# Patient Record
Sex: FEMALE | Race: BLACK OR AFRICAN AMERICAN | Employment: OTHER | URBAN - METROPOLITAN AREA
[De-identification: names, ages, dates, MRNs, and addresses within clinical notes are randomized per-mention and may not be internally consistent; named-entity substitution may affect disease eponyms.]

---

## 2020-08-25 ENCOUNTER — APPOINTMENT (OUTPATIENT)
Dept: GENERAL RADIOLOGY | Age: 75
End: 2020-08-25
Payer: MEDICAID

## 2020-08-25 ENCOUNTER — APPOINTMENT (OUTPATIENT)
Dept: CT IMAGING | Age: 75
End: 2020-08-25
Payer: MEDICAID

## 2020-08-25 ENCOUNTER — HOSPITAL ENCOUNTER (EMERGENCY)
Age: 75
Discharge: HOME | End: 2020-08-26
Attending: EMERGENCY MEDICINE
Payer: MEDICAID

## 2020-08-25 LAB
BASOPHILS ABSOLUTE: 0.03 E9/L (ref 0–0.2)
BASOPHILS RELATIVE PERCENT: 0.4 % (ref 0–2)
BILIRUBIN URINE: NEGATIVE
BLOOD, URINE: NORMAL
CLARITY: CLEAR
COLOR: YELLOW
EOSINOPHILS ABSOLUTE: 0.06 E9/L (ref 0.05–0.5)
EOSINOPHILS RELATIVE PERCENT: 0.9 % (ref 0–6)
GLUCOSE URINE: NEGATIVE MG/DL
HCT VFR BLD CALC: 42.5 % (ref 34–48)
HEMOGLOBIN: 13.2 G/DL (ref 11.5–15.5)
IMMATURE GRANULOCYTES #: 0.02 E9/L
IMMATURE GRANULOCYTES %: 0.3 % (ref 0–5)
KETONES, URINE: NEGATIVE MG/DL
LACTIC ACID: 1.2 MMOL/L (ref 0.5–2.2)
LEUKOCYTE ESTERASE, URINE: NEGATIVE
LIPASE: 120 U/L (ref 13–60)
LYMPHOCYTES ABSOLUTE: 1.78 E9/L (ref 1.5–4)
LYMPHOCYTES RELATIVE PERCENT: 26.4 % (ref 20–42)
MCH RBC QN AUTO: 28.7 PG (ref 26–35)
MCHC RBC AUTO-ENTMCNC: 31.1 % (ref 32–34.5)
MCV RBC AUTO: 92.4 FL (ref 80–99.9)
MONOCYTES ABSOLUTE: 0.51 E9/L (ref 0.1–0.95)
MONOCYTES RELATIVE PERCENT: 7.6 % (ref 2–12)
NEUTROPHILS ABSOLUTE: 4.33 E9/L (ref 1.8–7.3)
NEUTROPHILS RELATIVE PERCENT: 64.4 % (ref 43–80)
NITRITE, URINE: NEGATIVE
PDW BLD-RTO: 12.7 FL (ref 11.5–15)
PH UA: 7.5 (ref 5–9)
PLATELET # BLD: 277 E9/L (ref 130–450)
PMV BLD AUTO: 9.8 FL (ref 7–12)
PROTEIN UA: NEGATIVE MG/DL
RBC # BLD: 4.6 E12/L (ref 3.5–5.5)
REASON FOR REJECTION: NORMAL
REJECTED TEST: NORMAL
SPECIFIC GRAVITY UA: 1.02 (ref 1–1.03)
UROBILINOGEN, URINE: 1 E.U./DL
WBC # BLD: 6.7 E9/L (ref 4.5–11.5)

## 2020-08-25 PROCEDURE — 80053 COMPREHEN METABOLIC PANEL: CPT

## 2020-08-25 PROCEDURE — 74177 CT ABD & PELVIS W/CONTRAST: CPT

## 2020-08-25 PROCEDURE — 84484 ASSAY OF TROPONIN QUANT: CPT

## 2020-08-25 PROCEDURE — 85025 COMPLETE CBC W/AUTO DIFF WBC: CPT

## 2020-08-25 PROCEDURE — 87088 URINE BACTERIA CULTURE: CPT

## 2020-08-25 PROCEDURE — 71046 X-RAY EXAM CHEST 2 VIEWS: CPT

## 2020-08-25 PROCEDURE — 81001 URINALYSIS AUTO W/SCOPE: CPT

## 2020-08-25 PROCEDURE — 99285 EMERGENCY DEPT VISIT HI MDM: CPT

## 2020-08-25 PROCEDURE — 83605 ASSAY OF LACTIC ACID: CPT

## 2020-08-25 PROCEDURE — 83690 ASSAY OF LIPASE: CPT

## 2020-08-25 RX ORDER — CHLORHEXIDINE GLUCONATE 0.12 MG/ML
15 RINSE ORAL PRN
COMMUNITY
Start: 2020-04-10 | End: 2021-07-14

## 2020-08-25 RX ORDER — AMMONIUM LACTATE 12 G/100G
CREAM TOPICAL PRN
COMMUNITY
Start: 2020-05-19

## 2020-08-25 ASSESSMENT — PAIN DESCRIPTION - ONSET: ONSET: ON-GOING

## 2020-08-25 ASSESSMENT — PAIN DESCRIPTION - ORIENTATION: ORIENTATION: LEFT

## 2020-08-25 ASSESSMENT — PAIN DESCRIPTION - LOCATION: LOCATION: ABDOMEN

## 2020-08-25 ASSESSMENT — PAIN DESCRIPTION - PAIN TYPE: TYPE: ACUTE PAIN

## 2020-08-25 ASSESSMENT — PAIN DESCRIPTION - DESCRIPTORS: DESCRIPTORS: BURNING

## 2020-08-25 ASSESSMENT — PAIN DESCRIPTION - FREQUENCY: FREQUENCY: INTERMITTENT

## 2020-08-25 ASSESSMENT — PAIN DESCRIPTION - DIRECTION: RADIATING_TOWARDS: LEFT SIDE

## 2020-08-25 ASSESSMENT — PAIN SCALES - GENERAL: PAINLEVEL_OUTOF10: 5

## 2020-08-25 NOTE — ED NOTES
FIRST PROVIDER CONTACT ASSESSMENT NOTE      Department of Emergency Medicine   ED  First Provider Note   8/25/20  6:36 PM EDT    Chief Complaint: Dizziness (feeling lighted headed this am)      History of Present Illness:    Davi Mcintosh is a 75 y.o. female who presents to the ED by private car for  LEFT FLANK PAIN DIZZINESS   Focused Screening Exam:  Constitutional:  Alert, appears stated age and is in no distress.   Heart regular rate and rhythm  Lungs clear    *ALLERGIES*     Darvocet [propoxyphene n-acetaminophen] and Darvon [propoxyphene hcl]     ED Triage Vitals   BP Temp Temp src Pulse Resp SpO2 Height Weight   -- -- -- -- -- -- -- --        Initial Plan of Care:  Initiate Treatment-Testing, Proceed toTreatment Area When Bed Available for ED Attending/MLP to Continue Care    -----------------END OF FIRST PROVIDER CONTACT ASSESSMENT NOTE--------------  Electronically signed by ADDISON Trammell   DD: 8/25/20     ADDISON Trammell  08/26/20 7615

## 2020-08-26 VITALS
TEMPERATURE: 98.1 F | RESPIRATION RATE: 14 BRPM | HEART RATE: 68 BPM | OXYGEN SATURATION: 97 % | WEIGHT: 156 LBS | SYSTOLIC BLOOD PRESSURE: 155 MMHG | HEIGHT: 62 IN | BODY MASS INDEX: 28.71 KG/M2 | DIASTOLIC BLOOD PRESSURE: 82 MMHG

## 2020-08-26 LAB
ALBUMIN SERPL-MCNC: 3.8 G/DL (ref 3.5–5.2)
ALP BLD-CCNC: 111 U/L (ref 35–104)
ALT SERPL-CCNC: 8 U/L (ref 0–32)
ANION GAP SERPL CALCULATED.3IONS-SCNC: 6 MMOL/L (ref 7–16)
AST SERPL-CCNC: 20 U/L (ref 0–31)
BACTERIA: ABNORMAL /HPF
BILIRUB SERPL-MCNC: 0.4 MG/DL (ref 0–1.2)
BUN BLDV-MCNC: 10 MG/DL (ref 8–23)
CALCIUM SERPL-MCNC: 9.3 MG/DL (ref 8.6–10.2)
CHLORIDE BLD-SCNC: 105 MMOL/L (ref 98–107)
CO2: 26 MMOL/L (ref 22–29)
CREAT SERPL-MCNC: 0.7 MG/DL (ref 0.5–1)
EPITHELIAL CELLS, UA: ABNORMAL /HPF
GFR AFRICAN AMERICAN: >60
GFR NON-AFRICAN AMERICAN: >60 ML/MIN/1.73
GLUCOSE BLD-MCNC: 87 MG/DL (ref 74–99)
POTASSIUM SERPL-SCNC: 4.1 MMOL/L (ref 3.5–5)
RBC UA: ABNORMAL /HPF (ref 0–2)
SODIUM BLD-SCNC: 137 MMOL/L (ref 132–146)
TOTAL PROTEIN: 7.5 G/DL (ref 6.4–8.3)
TROPONIN: <0.01 NG/ML (ref 0–0.03)
WBC UA: ABNORMAL /HPF (ref 0–5)

## 2020-08-26 PROCEDURE — 74177 CT ABD & PELVIS W/CONTRAST: CPT

## 2020-08-26 PROCEDURE — 2580000003 HC RX 258: Performed by: RADIOLOGY

## 2020-08-26 PROCEDURE — 6360000004 HC RX CONTRAST MEDICATION: Performed by: RADIOLOGY

## 2020-08-26 RX ORDER — SODIUM CHLORIDE 0.9 % (FLUSH) 0.9 %
10 SYRINGE (ML) INJECTION ONCE
Status: COMPLETED | OUTPATIENT
Start: 2020-08-26 | End: 2020-08-26

## 2020-08-26 RX ADMIN — IOPAMIDOL 110 ML: 755 INJECTION, SOLUTION INTRAVENOUS at 00:47

## 2020-08-26 RX ADMIN — SODIUM CHLORIDE, PRESERVATIVE FREE 10 ML: 5 INJECTION INTRAVENOUS at 00:47

## 2020-08-26 NOTE — ED NOTES
Reviewed discharge instructions with patient, discussed medications and addressed all patient and family questions/concerns. Pt verbalizes understanding.        Theresa Pop RN  08/26/20 8407

## 2020-08-26 NOTE — ED PROVIDER NOTES
Department of Emergency Medicine   ED  Provider Note  Admit Date/RoomTime: 8/25/2020  8:10 PM  ED Room: 16/16 8/25/20  9:25 PM EDT      HISTORY OF PRESENT ILLNESS:  (Nurses Notes Reviewed)    Chief Complaint:   Dizziness (feeling lighted headed this am)      Source of history provided by:  patient. History/Exam Limitations: none. Marck Beal is a 75 y.o. old female presenting to the emergency department for complaints of sudden onset Lightheaded which began several hour(s) prior to arrival.  Since recognized her symptoms have been improving. She has associated symptoms of L flank pain and denies any no other pertinent symptoms. The symptoms are aggravated by standing up. The patient has a past history of: No other pertinent PMH. There has been no history of recent trauma. Patient ports this morning upon standing she would get lightheaded. She denies any current lightheadedness. In addition she is complaining of intermittent episodes of left flank pain that she reports improves after taking Tums. The pain does not radiate anywhere else. She denies headache, vision changes, chest pain, shortness of breath, abdominal pain or any other complaints at this time      Review of Systems:   Pertinent positives and negatives are stated within HPI, all other systems reviewed and are negative. Review of Systems   Constitutional: Negative for chills and fever. HENT: Negative for ear pain, sinus pressure and sore throat. Eyes: Negative for pain, discharge and redness. Respiratory: Negative for cough, shortness of breath and wheezing. Cardiovascular: Negative for chest pain. Gastrointestinal: Negative for abdominal distention, abdominal pain, diarrhea, nausea and vomiting. Genitourinary: Positive for flank pain. Negative for dysuria, frequency and urgency. Musculoskeletal: Negative for arthralgias and back pain. Skin: Negative for rash and wound.    Neurological: Positive for light-headedness. Negative for dizziness, syncope, weakness and headaches. Hematological: Negative for adenopathy. All other systems reviewed and are negative.            --------------------------------------------- PAST HISTORY ---------------------------------------------  Past Medical History:  has a past medical history of Hypertension. Past Surgical History:  has a past surgical history that includes Rotator cuff repair (Bilateral). Social History:  reports that she has never smoked. She has never used smokeless tobacco. She reports current alcohol use. She reports that she does not use drugs. Family History: family history is not on file. The patients home medications have been reviewed. Allergies: Darvocet [propoxyphene n-acetaminophen] and Darvon [propoxyphene hcl]        ---------------------------------------------------PHYSICAL EXAM--------------------------------------    Constitutional/General: Alert and oriented x3, well appearing, non toxic in NAD  Head: Normocephalic and atraumatic  Eyes: PERRL, EOMI  Mouth: Oropharynx clear, handling secretions, no trismus  Neck: Supple, full ROM, non tender to palpation in the midline, no stridor, no crepitus, no meningeal signs  Pulmonary: Lungs clear to auscultation bilaterally, no wheezes, rales, or rhonchi. Not in respiratory distress  Cardiovascular:  Regular rate. Regular rhythm. No murmurs, gallops, or rubs. 2+ distal pulses  Chest: no chest wall tenderness  Abdomen: Soft. Non tender. Non distended. +BS. No rebound, guarding, or rigidity. No pulsatile masses appreciated. Musculoskeletal: Moves all extremities x 4. Warm and well perfused, no clubbing, cyanosis, or edema. Capillary refill <3 seconds  Skin: warm and dry. No rashes. Neurologic: GCS 15, CN 2-12 grossly intact, no focal deficits, symmetric strength 5/5 in the upper and lower extremities bilaterally. no ataxia.  NIH =0  Psych: Normal 137 132 - 146 mmol/L    Potassium 4.1 3.5 - 5.0 mmol/L    Chloride 105 98 - 107 mmol/L    CO2 26 22 - 29 mmol/L    Anion Gap 6 (L) 7 - 16 mmol/L    Glucose 87 74 - 99 mg/dL    BUN 10 8 - 23 mg/dL    CREATININE 0.7 0.5 - 1.0 mg/dL    GFR Non-African American >60 >=60 mL/min/1.73    GFR African American >60     Calcium 9.3 8.6 - 10.2 mg/dL    Total Protein 7.5 6.4 - 8.3 g/dL    Alb 3.8 3.5 - 5.2 g/dL    Total Bilirubin 0.4 0.0 - 1.2 mg/dL    Alkaline Phosphatase 111 (H) 35 - 104 U/L    ALT 8 0 - 32 U/L    AST 20 0 - 31 U/L   Troponin   Result Value Ref Range    Troponin <0.01 0.00 - 0.03 ng/mL   Microscopic Urinalysis   Result Value Ref Range    WBC, UA 0-1 0 - 5 /HPF    RBC, UA 1-3 0 - 2 /HPF    Epithelial Cells, UA MODERATE /HPF    Bacteria, UA FEW (A) None Seen /HPF       RADIOLOGY:  Interpreted by Radiologist.  CT ABDOMEN PELVIS W IV CONTRAST Additional Contrast? None   Final Result   1. No free intraperitoneal air, ascites or indication for bowel   obstruction. 2. Uncomplicated diverticulosis in the left-sided colon. 3. Unremarkable appearance for the appendix. 4. No obstructive uropathy      5. Presence of gallstones. The no dilatation of the biliary tree. 6. Mild dilatation of the pancreatic duct in the area of the head of   the pancreas but not towards the body and tail. Can consider further   evaluation with MRCP. XR CHEST (2 VW)   Final Result      No airspace opacities or pleural effusion.                  ------------------------- NURSING NOTES AND VITALS REVIEWED ---------------------------   The nursing notes within the ED encounter and vital signs as below have been reviewed by myself. BP (!) 155/82   Pulse 68   Temp 98.1 °F (36.7 °C)   Resp 14   Ht 5' 2\" (1.575 m)   Wt 156 lb (70.8 kg)   SpO2 97%   BMI 28.53 kg/m²   Oxygen Saturation Interpretation: Normal    The patients available past medical records and past encounters were reviewed. ------------------------------ ED COURSE/MEDICAL DECISION MAKING----------------------  Medications   iopamidol (ISOVUE-370) 76 % injection 110 mL (110 mLs Intravenous Given 20)   sodium chloride flush 0.9 % injection 10 mL (10 mLs Intravenous Given 20)             Medical Decision Makin-year-old female who presents for 1 day of lightheadedness upon standing that resolved. Patient asymptomatic on reevaluation's. She has complaint of episodes of left flank pain that also resolved after Tums. Laboratory studies as well as imaging unremarkable. Patient is able to stand and walk without symptoms. Blood pressure remained stable. Patient and daughter comfortable with discharge plan all questions answered. Return precautions discussed. ED Course as of Aug 27 1034   Wed Aug 26, 2020   0115 Negative orthostatics. assymptomatic    [MF]      ED Course User Index  [MF] Tapan Beltre,          This patient's ED course included: re-evaluation prior to disposition, IV medications, cardiac monitoring, continuous pulse oximetry, complex medical decision making and emergency management and a personal history and physicial eaxmination    This patient has remained hemodynamically stable during their ED course. Re-Evaluations:             Re-evaluation. Patients symptoms are improving        Counseling: The emergency provider has spoken with the patient and discussed todays results, in addition to providing specific details for the plan of care and counseling regarding the diagnosis and prognosis. Questions are answered at this time and they are agreeable with the plan.       --------------------------------- IMPRESSION AND DISPOSITION ---------------------------------    IMPRESSION  1. Lightheadedness Improving   2. Elevated lipase    3.  Flank pain        DISPOSITION  Disposition: Discharge to home  Patient condition is stable      NOTE: This report was transcribed using voice recognition software.  Every effort was made to ensure accuracy; however, inadvertent computerized transcription errors may be present       Cristin Womack DO  08/27/20 1038

## 2020-08-26 NOTE — ED TRIAGE NOTES
Pt arrived in ED with c/o dizziness (off balance vs room spinning) with LUQ abdominal pain that radiates into her left side this morning. Denies fall, injury, trauma, nausea, vomiting. She states she took 2 tums today because she thought she had heartburn. Daughter at bedside.

## 2020-08-27 LAB — URINE CULTURE, ROUTINE: NORMAL

## 2020-08-27 ASSESSMENT — ENCOUNTER SYMPTOMS
VOMITING: 0
COUGH: 0
NAUSEA: 0
SINUS PRESSURE: 0
SHORTNESS OF BREATH: 0
EYE REDNESS: 0
BACK PAIN: 0
EYE PAIN: 0
WHEEZING: 0
EYE DISCHARGE: 0
ABDOMINAL DISTENTION: 0
DIARRHEA: 0
SORE THROAT: 0
ABDOMINAL PAIN: 0

## 2020-09-28 ENCOUNTER — HOSPITAL ENCOUNTER (OUTPATIENT)
Dept: NON INVASIVE DIAGNOSTICS | Age: 75
Discharge: HOME | End: 2020-09-28
Payer: MEDICAID

## 2020-09-28 ENCOUNTER — HOSPITAL ENCOUNTER (OUTPATIENT)
Dept: MAMMOGRAPHY | Age: 75
Discharge: HOME | End: 2020-09-30
Payer: MEDICAID

## 2020-09-28 LAB
LV EF: 63 %
LVEF MODALITY: NORMAL

## 2020-09-28 PROCEDURE — 93306 TTE W/DOPPLER COMPLETE: CPT

## 2020-09-28 PROCEDURE — 77080 DXA BONE DENSITY AXIAL: CPT

## 2021-05-03 ENCOUNTER — HOSPITAL ENCOUNTER (OUTPATIENT)
Age: 76
Discharge: HOME | End: 2021-05-05
Payer: COMMERCIAL

## 2021-05-03 ENCOUNTER — HOSPITAL ENCOUNTER (OUTPATIENT)
Dept: GENERAL RADIOLOGY | Age: 76
Discharge: HOME | End: 2021-05-05
Payer: COMMERCIAL

## 2021-05-03 DIAGNOSIS — M25.562 LEFT KNEE PAIN, UNSPECIFIED CHRONICITY: ICD-10-CM

## 2021-05-03 PROCEDURE — 73560 X-RAY EXAM OF KNEE 1 OR 2: CPT

## 2021-07-14 ENCOUNTER — OFFICE VISIT (OUTPATIENT)
Dept: ORTHOPEDIC SURGERY | Age: 76
End: 2021-07-14
Payer: COMMERCIAL

## 2021-07-14 VITALS — WEIGHT: 150 LBS | HEIGHT: 62 IN | BODY MASS INDEX: 27.6 KG/M2

## 2021-07-14 DIAGNOSIS — M25.562 LEFT KNEE PAIN, UNSPECIFIED CHRONICITY: ICD-10-CM

## 2021-07-14 DIAGNOSIS — M16.11 PRIMARY OSTEOARTHRITIS OF RIGHT HIP: Primary | ICD-10-CM

## 2021-07-14 PROCEDURE — G8427 DOCREV CUR MEDS BY ELIG CLIN: HCPCS | Performed by: ORTHOPAEDIC SURGERY

## 2021-07-14 PROCEDURE — G8399 PT W/DXA RESULTS DOCUMENT: HCPCS | Performed by: ORTHOPAEDIC SURGERY

## 2021-07-14 PROCEDURE — 4040F PNEUMOC VAC/ADMIN/RCVD: CPT | Performed by: ORTHOPAEDIC SURGERY

## 2021-07-14 PROCEDURE — 99204 OFFICE O/P NEW MOD 45 MIN: CPT | Performed by: ORTHOPAEDIC SURGERY

## 2021-07-14 PROCEDURE — 1036F TOBACCO NON-USER: CPT | Performed by: ORTHOPAEDIC SURGERY

## 2021-07-14 PROCEDURE — 1123F ACP DISCUSS/DSCN MKR DOCD: CPT | Performed by: ORTHOPAEDIC SURGERY

## 2021-07-14 PROCEDURE — G8417 CALC BMI ABV UP PARAM F/U: HCPCS | Performed by: ORTHOPAEDIC SURGERY

## 2021-07-14 PROCEDURE — 1090F PRES/ABSN URINE INCON ASSESS: CPT | Performed by: ORTHOPAEDIC SURGERY

## 2021-07-14 RX ORDER — OXYBUTYNIN CHLORIDE 5 MG/1
5 TABLET ORAL DAILY
COMMUNITY

## 2021-07-14 NOTE — PROGRESS NOTES
Chief Complaint:   Chief Complaint   Patient presents with    Knee Pain     Left knee pain and swelling x 3 months. Denies injury or previous surgery. Takes Ibuprofen 800 mg prn pain. Xray of left knee in epic      Hip Pain     Right hip pain for many years. Denies injury or previous surgery. Takes Iburofen 800 mg prn for pain. No recent xrays. HPI 18-year-old woman here with chronic right hip and left knee pain. Known history of osteoarthritis progressively disabling not managed with oral medications. Uses a cane. States she was told by Dr. Monica Mejia several years ago that she required right hip replacement but had to have a dental infection cleared up first.    Past history of hypertension but denies cardiac disease diabetes or other significant medical issues. There is no problem list on file for this patient. Past Medical History:   Diagnosis Date    Hypertension        Past Surgical History:   Procedure Laterality Date    ROTATOR CUFF REPAIR Bilateral        Current Outpatient Medications   Medication Sig Dispense Refill    oxybutynin (DITROPAN) 5 MG tablet Take 5 mg by mouth daily      ammonium lactate (AMLACTIN) 12 % cream Apply topically as needed for Dry Skin       amLODIPine-benazepril (LOTREL) 5-20 MG per capsule Take 1 capsule by mouth daily.  ibuprofen (ADVIL;MOTRIN) 800 MG tablet Take 1 tablet by mouth 3 times daily. No current facility-administered medications for this visit. Allergies   Allergen Reactions    Darvocet [Propoxyphene N-Acetaminophen]     Darvon [Propoxyphene Hcl]        Social History     Socioeconomic History    Marital status:       Spouse name: Not on file    Number of children: Not on file    Years of education: Not on file    Highest education level: Not on file   Occupational History    Not on file   Tobacco Use    Smoking status: Never Smoker    Smokeless tobacco: Never Used   Vaping Use    Vaping Use: Never used Substance and Sexual Activity    Alcohol use: Yes     Comment: social    Drug use: Never    Sexual activity: Not on file   Other Topics Concern    Not on file   Social History Narrative    Not on file     Social Determinants of Health     Financial Resource Strain:     Difficulty of Paying Living Expenses:    Food Insecurity:     Worried About Running Out of Food in the Last Year:     [address] in the Last Year:    Transportation Needs:     Lack of Transportation (Medical):  Lack of Transportation (Non-Medical):    Physical Activity:     Days of Exercise per Week:     Minutes of Exercise per Session:    Stress:     Feeling of Stress :    Social Connections:     Frequency of Communication with Friends and Family:     Frequency of Social Gatherings with Friends and Family:     Attends Sikh Services:     Active Member of Clubs or Organizations:     Attends Club or Organization Meetings:     Marital Status:    Intimate Partner Violence:     Fear of Current or Ex-Partner:     Emotionally Abused:     Physically Abused:     Sexually Abused:        No family history on file. Review of Systems   No fever, chills, or other constitutionalsymptoms. No numbness or other neuro symptoms. No chest pain. No dyspnea. [unfilled]    Extremely pleasant    Elderly woman stands from a chair and walk with some difficulty ambulates with a limp with straight cane. Clinical exam of leg length limited by 10 degree right hip flexion contracture. Right hip barely rotates to neutral.  Left hip rotates internally about 10 degrees. Left knee has varus and chronic degenerative changes but no acute effusion. Range of motion 5 to 80 degrees left knee. Physical Exam    Patient is alert and oriented. Well-developed well-nourished. BMI 27. Pupils equal and reactive. Scleraeanicteric. Neck supple  Lungs clear. Cardiac rate and rhythm regular. Abdomen soft and nontender.   Skin warm and dry.       XRAY: X-ray today AP pelvis with AP and lateral views right hip. Severe arthrosis of right hip with complete joint space loss, loss of sphericity of the femoral head and encapsulating superior osteophytes. Left hip shows stage III degenerative changes with intact femoral head architecture. Impression: Severe arthrosis right hip. Recent left knee x-rays AP lateral bleak views reviewed. Stage IV tricompartmental degenerative changes left knee with varus and lateral tibial shift noted. ASSESSMENT/PLAN:    Avery Farooq was seen today for knee pain and hip pain. Diagnoses and all orders for this visit:    Primary osteoarthritis of right hip  -     XR HIP 2-3 VW W PELVIS RIGHT; Future    Left knee pain, unspecified chronicity    Patient's primary complaint and most severely affected joint on exam and radiographs is her right hip. She would not be able to benefit from further conservative measures given the right hip stiffness so that PT would not be effective given the mechanical derangement of the hip. She is ready to plan right total hip arthroplasty. Preoperative teaching given today. The procedure, indications, risks, limitations and recovery time were all reviewed with patient, who requests to proceed. Return for TBA postop right total hip.        Moriah Nieves MD    7/14/2021  1:50 PM

## 2021-07-14 NOTE — PROGRESS NOTES
RIGHT HIP TOTAL JOINT ARTHROPLASTY, [place], general versus spinal anesthesia, Date: Tuesday, August 17, 2021, Time: 8:00 am, Place: [address], Surgeon: Valarie Szymanski. Reviewed medications with patient / holding the following medications for surgery: Ibuprofen, Advil, Motrin and baby aspirin 7 days pre op. Will inform PCP: Jaguar Greenfield of plans for surgery. Patient has regular checkups with the dentist. Patient will make an appointment for cleaning and exam and see the dentist before surgery. Dietary Handout: Patient Education Guide Regarding Iron Replacement given to and reviewed with patient. Patient instructed to begin eating foods rich in Iron and Vitamin C one month pre op and continue for one month post op. Pre op instructions and Total Joint Folder given to the patient. Patient informed: A hospital nurse from Pre Admission Testing will contact the patient with a date for Pre Admission Testing and Mandatory Joint Class. Patient expresses understanding and is in agreement with the plan. After review of the pre op information at home; patient will call office with any questions, concerns or problems. Patient had the Covid Vaccine    DME needs: [employer] and 3:1 commode.  Patient has a straight cane    Post Op Appointment: Thursday, August 26 th at 10:15 am

## 2021-07-22 ENCOUNTER — TELEPHONE (OUTPATIENT)
Dept: ORTHOPEDIC SURGERY | Age: 76
End: 2021-07-22

## 2021-07-22 NOTE — TELEPHONE ENCOUNTER
7/22/2021 9:49 am Pre-Optimization Checklist faxed to [facility], nurse navigator, [place], [place], [phone]  Surgery: [facility] 8/17/2021 Carlos Alcazar

## 2021-08-02 NOTE — PROGRESS NOTES
Patient states has received the last dose of the COVID vaccine and is 2 weeks post last dose. Patient instructed to bring the [employer] card or a picture of the card,  Day of pat  Patient verbalized understanding.

## 2021-08-09 ENCOUNTER — TELEPHONE (OUTPATIENT)
Dept: ORTHOPEDIC SURGERY | Age: 76
End: 2021-08-09

## 2021-08-09 NOTE — PROGRESS NOTES
[facility] PRE-ADMISSION TESTING GENERAL INSTRUCTIONS- [facility]-phone number:[phone]    GENERAL INSTRUCTIONS    [x] Edgar wipe instruction sheet and wipes given. [x] Nothing by mouth after midnight, including gum, candy, mints, or water. [x] You may brush your teeth, gargle, but do NOT swallow water. [x]No smoking, chewing tobacco, illegal drugs, or alcohol within 24 hours of your surgery. [x] Jewelry, valuables or body piercing's should not be brought to the hospital. All body and/or tongue piercing's must be removed prior to arriving to hospital.  ALL hair pins must be removed. [x] Do not wear makeup, lotions, powders, deodorant. Nail polish as directed by the nurse. [x] Bring insurance card and photo ID. [x] Transfusion Bracelet: Please bring with you to hospital, day of surgery       PARKING INSTRUCTIONS:   [x] Arrival Time:___0600__________  · [x] Parking lot '\"I\"  is located on [facility] (the corner of [facility] and [facility]). To enter, press the button and the gate will lift. A free token will be provided to exit the lot. One car per patient is allowed to park in this lot. All other cars are to park on [address] [place] [address] either in the parking garage or the handicap lot. EDUCATION INSTRUCTIONS:      [x] Knee or hip replacement booklet & exercise pamphlets given. [x] Pre-admission Testing educational folder given  [x] Incentive Spirometry,coughing & deep breathing exercises reviewed. [x]Fluoroscopy-Xray used in surgery reviewed with patient. Educational pamphlet placed in chart. [x]Pain: Post-op pain is normal and to be expected. You will be asked to rate your pain from 0-10(a zero is not acceptable-education is needed). Your post-op pain goal is:  [x] Ask your nurse for your pain medication. [x] Joint camp offered. [x] Joint replacement booklets given.       MEDICATION INSTRUCTIONS:   [x]Bring a complete list of your medications, please write the last time you took the medicine, give this list to the nurse. [x] Take the following medications the morning of surgery with 1-2 ounces of water: no meds that am  [x] Stop herbal supplements and vitamins 5 days before your surgery. [x] Follow physician instructions regarding any blood thinners you may be taking. WHAT TO EXPECT:  [x] The day of surgery you will be greeted and checked in by the [employer].  In addition, you will be registered in the [place] by a Patient Access Representative. Please bring your photo ID and insurance card. A nurse will greet you in accordance to the time you are needed in the pre-op area to prepare you for surgery. Please do not be discouraged if you are not greeted in the order you arrive as there are many variables that are involved in patient preparation. Your patience is greatly appreciated as you wait for your nurse. Please bring in items such as: books, magazines, newspapers, electronics, or any other items  to occupy your time in the waiting area. [x]  Delays may occur with surgery and staff will make a sincere effort to keep you informed of delays. If any delays occur with your procedure, we apologize ahead of time for your inconvenience as we recognize the value of your time.

## 2021-08-09 NOTE — TELEPHONE ENCOUNTER
8/09/2021 10:56 am Patient phoned the office / Message left on voice mail: Saw the dentist. Everything was OK.  They need a form from your office to fill out.    8/09/2021 2:25 pm Follow up phone call / Message left on patient voice mail: Call back w/ dentist's fax number    8/09/ 2021 3:00 pm Patient phoned the office w/ fax number    8/09/2021 3:14 pm Fax sent to Dr. Garcia [facility] [phone]: A form to fill out for Dental Clearance for Surgery

## 2021-08-10 ENCOUNTER — HOSPITAL ENCOUNTER (OUTPATIENT)
Dept: GENERAL RADIOLOGY | Age: 76
Discharge: HOME | End: 2021-08-12
Payer: COMMERCIAL

## 2021-08-10 ENCOUNTER — HOSPITAL ENCOUNTER (OUTPATIENT)
Dept: PREADMISSION TESTING | Age: 76
Discharge: HOME | End: 2021-08-10
Payer: COMMERCIAL

## 2021-08-10 ENCOUNTER — HOSPITAL ENCOUNTER (OUTPATIENT)
Age: 76
Discharge: HOME | End: 2021-08-10
Payer: COMMERCIAL

## 2021-08-10 VITALS
DIASTOLIC BLOOD PRESSURE: 67 MMHG | WEIGHT: 157 LBS | RESPIRATION RATE: 20 BRPM | HEIGHT: 62 IN | TEMPERATURE: 98.6 F | SYSTOLIC BLOOD PRESSURE: 157 MMHG | BODY MASS INDEX: 28.89 KG/M2 | HEART RATE: 92 BPM | OXYGEN SATURATION: 95 %

## 2021-08-10 DIAGNOSIS — Z01.818 PREOP TESTING: ICD-10-CM

## 2021-08-10 LAB
ABO/RH: NORMAL
ALBUMIN SERPL-MCNC: 4.1 G/DL (ref 3.5–5.2)
ALP BLD-CCNC: 112 U/L (ref 35–104)
ALT SERPL-CCNC: 12 U/L (ref 0–32)
ANION GAP SERPL CALCULATED.3IONS-SCNC: 7 MMOL/L (ref 7–16)
ANTIBODY SCREEN: NORMAL
AST SERPL-CCNC: 24 U/L (ref 0–31)
BACTERIA: NORMAL /HPF
BASOPHILS ABSOLUTE: 0.05 E9/L (ref 0–0.2)
BASOPHILS RELATIVE PERCENT: 0.8 % (ref 0–2)
BILIRUB SERPL-MCNC: 0.4 MG/DL (ref 0–1.2)
BILIRUBIN URINE: NEGATIVE
BLOOD, URINE: ABNORMAL
BUN BLDV-MCNC: 13 MG/DL (ref 6–23)
CALCIUM SERPL-MCNC: 9.5 MG/DL (ref 8.6–10.2)
CHLORIDE BLD-SCNC: 108 MMOL/L (ref 98–107)
CLARITY: CLEAR
CO2: 25 MMOL/L (ref 22–29)
COLOR: YELLOW
CREAT SERPL-MCNC: 0.7 MG/DL (ref 0.5–1)
EKG ATRIAL RATE: 86 BPM
EKG P AXIS: 40 DEGREES
EKG P-R INTERVAL: 162 MS
EKG Q-T INTERVAL: 360 MS
EKG QRS DURATION: 88 MS
EKG QTC CALCULATION (BAZETT): 430 MS
EKG R AXIS: -15 DEGREES
EKG T AXIS: 3 DEGREES
EKG VENTRICULAR RATE: 86 BPM
EOSINOPHILS ABSOLUTE: 0.02 E9/L (ref 0.05–0.5)
EOSINOPHILS RELATIVE PERCENT: 0.3 % (ref 0–6)
GFR AFRICAN AMERICAN: >60
GFR NON-AFRICAN AMERICAN: >60 ML/MIN/1.73
GLUCOSE BLD-MCNC: 90 MG/DL (ref 74–99)
GLUCOSE URINE: NEGATIVE MG/DL
HCT VFR BLD CALC: 40.6 % (ref 34–48)
HEMOGLOBIN: 12.7 G/DL (ref 11.5–15.5)
IMMATURE GRANULOCYTES #: 0.03 E9/L
IMMATURE GRANULOCYTES %: 0.5 % (ref 0–5)
KETONES, URINE: NEGATIVE MG/DL
LEUKOCYTE ESTERASE, URINE: NEGATIVE
LYMPHOCYTES ABSOLUTE: 1.77 E9/L (ref 1.5–4)
LYMPHOCYTES RELATIVE PERCENT: 27.4 % (ref 20–42)
MCH RBC QN AUTO: 28.7 PG (ref 26–35)
MCHC RBC AUTO-ENTMCNC: 31.3 % (ref 32–34.5)
MCV RBC AUTO: 91.9 FL (ref 80–99.9)
MONOCYTES ABSOLUTE: 0.41 E9/L (ref 0.1–0.95)
MONOCYTES RELATIVE PERCENT: 6.4 % (ref 2–12)
NEUTROPHILS ABSOLUTE: 4.17 E9/L (ref 1.8–7.3)
NEUTROPHILS RELATIVE PERCENT: 64.6 % (ref 43–80)
NITRITE, URINE: NEGATIVE
PDW BLD-RTO: 12.3 FL (ref 11.5–15)
PH UA: 5.5 (ref 5–9)
PLATELET # BLD: 289 E9/L (ref 130–450)
PMV BLD AUTO: 9.6 FL (ref 7–12)
POTASSIUM SERPL-SCNC: 4.5 MMOL/L (ref 3.5–5)
PROTEIN UA: NEGATIVE MG/DL
RBC # BLD: 4.42 E12/L (ref 3.5–5.5)
RBC UA: NORMAL /HPF (ref 0–2)
SODIUM BLD-SCNC: 140 MMOL/L (ref 132–146)
SPECIFIC GRAVITY UA: 1.02 (ref 1–1.03)
TOTAL PROTEIN: 7.3 G/DL (ref 6.4–8.3)
UROBILINOGEN, URINE: 0.2 E.U./DL
WBC # BLD: 6.5 E9/L (ref 4.5–11.5)
WBC UA: NORMAL /HPF (ref 0–5)

## 2021-08-10 PROCEDURE — 81001 URINALYSIS AUTO W/SCOPE: CPT

## 2021-08-10 PROCEDURE — 80053 COMPREHEN METABOLIC PANEL: CPT

## 2021-08-10 PROCEDURE — 71046 X-RAY EXAM CHEST 2 VIEWS: CPT

## 2021-08-10 PROCEDURE — 93010 ELECTROCARDIOGRAM REPORT: CPT | Performed by: INTERNAL MEDICINE

## 2021-08-10 PROCEDURE — 87081 CULTURE SCREEN ONLY: CPT

## 2021-08-10 PROCEDURE — 86850 RBC ANTIBODY SCREEN: CPT

## 2021-08-10 PROCEDURE — 86901 BLOOD TYPING SEROLOGIC RH(D): CPT

## 2021-08-10 PROCEDURE — 36415 COLL VENOUS BLD VENIPUNCTURE: CPT

## 2021-08-10 PROCEDURE — 93005 ELECTROCARDIOGRAM TRACING: CPT | Performed by: ORTHOPAEDIC SURGERY

## 2021-08-10 PROCEDURE — 86900 BLOOD TYPING SEROLOGIC ABO: CPT

## 2021-08-10 PROCEDURE — 85025 COMPLETE CBC W/AUTO DIFF WBC: CPT

## 2021-08-10 NOTE — PROGRESS NOTES
I spoke with Tani Hope this am for an orthopaedic education class. We discussed information regarding pre, intra, post-op, discharge, and LOS expectations. I encouraged the patient to call with any questions or concerns.

## 2021-08-11 LAB — MRSA CULTURE ONLY: NORMAL

## 2021-08-11 NOTE — TELEPHONE ENCOUNTER
Late Entry 8/11/2021 8/09/2021 3:29 pm Kb Bass from [facility] [employer] [phone] phoned office / Message left on voice mail: Dr. Get Lares DDS is requesting a formal dental form to fill out for clearance for surgery. 8/09/2021 4:16 pm Follow up phone call to [employer] [phone] / Message left on voice mail for Yojana: No formal dental form available. Please fax note stating patient was examined and has no dental issues. 8/11/2021 11:07 am Yojana from [facility] [employer] [facility] [employer] phoned office / Message left on voice mail: This is my second message. Dr. Get Lares is requesting a form to fill out for dental clearance for surgery. 8/11/2021 11:25 am Follow up phone call to [facility] / Spoke w/ and informed Yojana: A return call and message was left on [employer] answering machine on 8/09/2021 at 4:16 pm. Informed Kb Bass: This office does not have a form for dental clearance. A note from the dentist is acceptable. Kb Bass expresses understanding. Kb Bass will inform Dr. Get Lares DDS and will fax a note to this office.

## 2021-08-11 NOTE — TELEPHONE ENCOUNTER
8/11/2021 12:08 pm Received a fax from [facility]: A note dated 8/11/2021 and signed by Dr. Jovani Cruz, Pt has no active infection and is cleared dentally for her surgery.     Above scanned to media

## 2021-08-12 ENCOUNTER — TELEPHONE (OUTPATIENT)
Dept: ORTHOPEDIC SURGERY | Age: 76
End: 2021-08-12

## 2021-08-12 NOTE — TELEPHONE ENCOUNTER
Spoke with Guillermo Jeffery @ [address] [place] [address] regarding pre-cert for Rt TIP (44613, M16.11)  Auth pending.   [facility] to request clinical.    Call Ref# [phone]  Pending Case ID#  [id number]

## 2021-08-13 NOTE — TELEPHONE ENCOUNTER
Spoke with Roxana Thorpe @ [facility] Pre-cert. She will expedite prior auth for Tuesday 8/17/2021 surgery.

## 2021-08-16 ENCOUNTER — ANESTHESIA EVENT (OUTPATIENT)
Dept: OPERATING ROOM | Age: 76
DRG: 470 | End: 2021-08-16
Payer: COMMERCIAL

## 2021-08-16 NOTE — H&P
Emma An MD   Physician   Specialty:  Orthopedic Surgery                                   Chief Complaint   Patient presents with                Hip Pain       Right hip pain for many years. Denies injury or previous surgery. Takes Iburofen 800 mg prn for pain. No recent xrays.           HPI 42-year-old woman here with chronic right hip and left knee pain. Known history of osteoarthritis progressively disabling not managed with oral medications. Uses a cane. States she was told by Dr. Melida Duncan several years ago that she required right hip replacement but had to have a dental infection cleared up first.     Past history of hypertension but denies cardiac disease diabetes or other significant medical issues.     There is no problem list on file for this patient.        Past Medical History        Past Medical History:   Diagnosis Date    Hypertension              Past Surgical History         Past Surgical History:   Procedure Laterality Date    ROTATOR CUFF REPAIR Bilateral              Current Facility-Administered Medications          Current Outpatient Medications   Medication Sig Dispense Refill    oxybutynin (DITROPAN) 5 MG tablet Take 5 mg by mouth daily        ammonium lactate (AMLACTIN) 12 % cream Apply topically as needed for Dry Skin         amLODIPine-benazepril (LOTREL) 5-20 MG per capsule Take 1 capsule by mouth daily.        ibuprofen (ADVIL;MOTRIN) 800 MG tablet Take 1 tablet by mouth 3 times daily.          No current facility-administered medications for this visit.                 Allergies   Allergen Reactions    Darvocet [Propoxyphene N-Acetaminophen]      Darvon [Propoxyphene Hcl]           Social History   Social History            Socioeconomic History    Marital status:         Spouse name: Not on file    Number of children: Not on file    Years of education: Not on file    Highest education level: Not on file   Occupational History    Not on file Tobacco Use    Smoking status: Never Smoker    Smokeless tobacco: Never Used   Vaping Use    Vaping Use: Never used   Substance and Sexual Activity    Alcohol use: Yes       Comment: social    Drug use: Never    Sexual activity: Not on file   Other Topics Concern    Not on file   Social History Narrative    Not on file      Social Determinants of Health          Financial Resource Strain:     Difficulty of Paying Living Expenses:    Food Insecurity:     Worried About Running Out of Food in the Last Year:     [address] in the Last Year:    Transportation Needs:     Lack of Transportation (Medical):  Lack of Transportation (Non-Medical):    Physical Activity:     Days of Exercise per Week:     Minutes of Exercise per Session:    Stress:     Feeling of Stress :    Social Connections:     Frequency of Communication with Friends and Family:     Frequency of Social Gatherings with Friends and Family:     Attends Jainism Services:     Active Member of Clubs or Organizations:     Attends Club or Organization Meetings:     Marital Status:    Intimate Partner Violence:     Fear of Current or Ex-Partner:     Emotionally Abused:     Physically Abused:     Sexually Abused:             Family History   No family history on file.          Review of Systems   No fever, chills, or other constitutionalsymptoms. No numbness or other neuro symptoms. No chest pain. No dyspnea.     [unfilled]    Extremely pleasant     Elderly woman stands from a chair and walk with some difficulty ambulates with a limp with straight cane. Clinical exam of leg length limited by 10 degree right hip flexion contracture. Right hip barely rotates to neutral.  Left hip rotates internally about 10 degrees. Left knee has varus and chronic degenerative changes but no acute effusion. Range of motion 5 to 80 degrees left knee.      Physical Exam    Patient is alert and oriented. Well-developed well-nourished.   BMI 27.  Pupils equal and reactive. Scleraeanicteric. Neck supple  Lungs clear. Cardiac rate and rhythm regular. Abdomen soft and nontender. Skin warm and dry.         XRAY: X-ray today AP pelvis with AP and lateral views right hip. Severe arthrosis of right hip with complete joint space loss, loss of sphericity of the femoral head and encapsulating superior osteophytes. Left hip shows stage III degenerative changes with intact femoral head architecture. Impression: Severe arthrosis right hip.     Recent left knee x-rays AP lateral bleak views reviewed. Stage IV tricompartmental degenerative changes left knee with varus and lateral tibial shift noted.                     ASSESSMENT/PLAN:     Primary osteoarthritis of right hip          Patient's primary complaint and most severely affected joint on exam and radiographs is her right hip. She would not be able to benefit from further conservative measures given the right hip stiffness so that PT would not be effective given the mechanical derangement of the hip. She is ready to plan right total hip arthroplasty. Preoperative teaching given today.   The procedure, indications, risks, limitations and recovery time were all reviewed with patient, who requests to proceed.              Adrienne Hutchins MD

## 2021-08-16 NOTE — PROGRESS NOTES
Patient called regarding question about BP medication \"am I supposed to take it in the morning\". Confirmed with patient she was referring to amlodipine-benazepril. Instructed patient she is not to take the morning of surgery. Patient verbalized understanding.

## 2021-08-16 NOTE — TELEPHONE ENCOUNTER
Surgery Rt TIP 8/17/2021 RITA KUMAR has been approved as OPT/ OBS.   Auth# [id number] Valid 8/17 - 11/15/2021

## 2021-08-17 ENCOUNTER — HOSPITAL ENCOUNTER (INPATIENT)
Age: 76
LOS: 3 days | Discharge: HOME HEALTH | DRG: 470 | End: 2021-08-20
Attending: ORTHOPAEDIC SURGERY | Admitting: ORTHOPAEDIC SURGERY
Payer: COMMERCIAL

## 2021-08-17 ENCOUNTER — APPOINTMENT (OUTPATIENT)
Dept: GENERAL RADIOLOGY | Age: 76
DRG: 470 | End: 2021-08-17
Attending: ORTHOPAEDIC SURGERY
Payer: COMMERCIAL

## 2021-08-17 ENCOUNTER — ANESTHESIA (OUTPATIENT)
Dept: OPERATING ROOM | Age: 76
DRG: 470 | End: 2021-08-17
Payer: COMMERCIAL

## 2021-08-17 VITALS
TEMPERATURE: 94.1 F | RESPIRATION RATE: 2 BRPM | SYSTOLIC BLOOD PRESSURE: 129 MMHG | OXYGEN SATURATION: 84 % | DIASTOLIC BLOOD PRESSURE: 80 MMHG

## 2021-08-17 DIAGNOSIS — Z96.641 STATUS POST TOTAL REPLACEMENT OF RIGHT HIP: ICD-10-CM

## 2021-08-17 DIAGNOSIS — Z01.818 PREOP TESTING: Primary | ICD-10-CM

## 2021-08-17 PROBLEM — M16.11 OSTEOARTHRITIS OF RIGHT HIP: Status: ACTIVE | Noted: 2021-08-17

## 2021-08-17 PROCEDURE — 6360000002 HC RX W HCPCS

## 2021-08-17 PROCEDURE — 88304 TISSUE EXAM BY PATHOLOGIST: CPT

## 2021-08-17 PROCEDURE — 3700000000 HC ANESTHESIA ATTENDED CARE: Performed by: ORTHOPAEDIC SURGERY

## 2021-08-17 PROCEDURE — 2500000003 HC RX 250 WO HCPCS: Performed by: ORTHOPAEDIC SURGERY

## 2021-08-17 PROCEDURE — 6360000002 HC RX W HCPCS: Performed by: ORTHOPAEDIC SURGERY

## 2021-08-17 PROCEDURE — 7100000001 HC PACU RECOVERY - ADDTL 15 MIN: Performed by: ORTHOPAEDIC SURGERY

## 2021-08-17 PROCEDURE — 0SR90JZ REPLACEMENT OF RIGHT HIP JOINT WITH SYNTHETIC SUBSTITUTE, OPEN APPROACH: ICD-10-PCS | Performed by: ORTHOPAEDIC SURGERY

## 2021-08-17 PROCEDURE — 2720000010 HC SURG SUPPLY STERILE: Performed by: ORTHOPAEDIC SURGERY

## 2021-08-17 PROCEDURE — 73502 X-RAY EXAM HIP UNI 2-3 VIEWS: CPT

## 2021-08-17 PROCEDURE — 2580000003 HC RX 258: Performed by: STUDENT IN AN ORGANIZED HEALTH CARE EDUCATION/TRAINING PROGRAM

## 2021-08-17 PROCEDURE — 2580000003 HC RX 258

## 2021-08-17 PROCEDURE — 88311 DECALCIFY TISSUE: CPT

## 2021-08-17 PROCEDURE — G0378 HOSPITAL OBSERVATION PER HR: HCPCS

## 2021-08-17 PROCEDURE — 6370000000 HC RX 637 (ALT 250 FOR IP): Performed by: STUDENT IN AN ORGANIZED HEALTH CARE EDUCATION/TRAINING PROGRAM

## 2021-08-17 PROCEDURE — 3600000015 HC SURGERY LEVEL 5 ADDTL 15MIN: Performed by: ORTHOPAEDIC SURGERY

## 2021-08-17 PROCEDURE — 6360000002 HC RX W HCPCS: Performed by: ANESTHESIOLOGY

## 2021-08-17 PROCEDURE — 2580000003 HC RX 258: Performed by: ORTHOPAEDIC SURGERY

## 2021-08-17 PROCEDURE — 3700000001 HC ADD 15 MINUTES (ANESTHESIA): Performed by: ORTHOPAEDIC SURGERY

## 2021-08-17 PROCEDURE — 6360000002 HC RX W HCPCS: Performed by: STUDENT IN AN ORGANIZED HEALTH CARE EDUCATION/TRAINING PROGRAM

## 2021-08-17 PROCEDURE — 3600000005 HC SURGERY LEVEL 5 BASE: Performed by: ORTHOPAEDIC SURGERY

## 2021-08-17 PROCEDURE — 2709999900 HC NON-CHARGEABLE SUPPLY: Performed by: ORTHOPAEDIC SURGERY

## 2021-08-17 PROCEDURE — 72170 X-RAY EXAM OF PELVIS: CPT

## 2021-08-17 PROCEDURE — 27130 TOTAL HIP ARTHROPLASTY: CPT | Performed by: ORTHOPAEDIC SURGERY

## 2021-08-17 PROCEDURE — 2500000003 HC RX 250 WO HCPCS

## 2021-08-17 PROCEDURE — 1200000000 HC SEMI PRIVATE

## 2021-08-17 PROCEDURE — 2500000003 HC RX 250 WO HCPCS: Performed by: STUDENT IN AN ORGANIZED HEALTH CARE EDUCATION/TRAINING PROGRAM

## 2021-08-17 PROCEDURE — 6370000000 HC RX 637 (ALT 250 FOR IP): Performed by: ORTHOPAEDIC SURGERY

## 2021-08-17 PROCEDURE — 7100000000 HC PACU RECOVERY - FIRST 15 MIN: Performed by: ORTHOPAEDIC SURGERY

## 2021-08-17 DEVICE — SCREW BNE LP 6.5X45 MM HEX TRITANIUM NS TRIDENT II: Type: IMPLANTABLE DEVICE | Site: HIP | Status: FUNCTIONAL

## 2021-08-17 DEVICE — STEM FEM SZ 3 L102MM NK L30MM 38MM OFFSET 127DEG HIP TI: Type: IMPLANTABLE DEVICE | Site: HIP | Status: FUNCTIONAL

## 2021-08-17 DEVICE — LINER ACET SZ C OD48MM ID32MM THK4.9MM 0DEG HIP X3: Type: IMPLANTABLE DEVICE | Site: HIP | Status: FUNCTIONAL

## 2021-08-17 DEVICE — HEAD FEM DIA32MM +0MM OFFSET CO CHROM LFIT V40 TAPR REV: Type: IMPLANTABLE DEVICE | Site: HIP | Status: FUNCTIONAL

## 2021-08-17 DEVICE — IMPLANTABLE DEVICE: Type: IMPLANTABLE DEVICE | Site: HIP | Status: FUNCTIONAL

## 2021-08-17 RX ORDER — PROPOFOL 10 MG/ML
INJECTION, EMULSION INTRAVENOUS PRN
Status: DISCONTINUED | OUTPATIENT
Start: 2021-08-17 | End: 2021-08-17 | Stop reason: SDUPTHER

## 2021-08-17 RX ORDER — OXYCODONE HYDROCHLORIDE 5 MG/1
5 TABLET ORAL EVERY 4 HOURS PRN
Status: DISCONTINUED | OUTPATIENT
Start: 2021-08-17 | End: 2021-08-20 | Stop reason: HOSPADM

## 2021-08-17 RX ORDER — PROMETHAZINE HYDROCHLORIDE 25 MG/ML
25 INJECTION, SOLUTION INTRAMUSCULAR; INTRAVENOUS EVERY 6 HOURS PRN
Status: DISCONTINUED | OUTPATIENT
Start: 2021-08-17 | End: 2021-08-20 | Stop reason: HOSPADM

## 2021-08-17 RX ORDER — KETOROLAC TROMETHAMINE 30 MG/ML
INJECTION, SOLUTION INTRAMUSCULAR; INTRAVENOUS PRN
Status: DISCONTINUED | OUTPATIENT
Start: 2021-08-17 | End: 2021-08-17 | Stop reason: SDUPTHER

## 2021-08-17 RX ORDER — MORPHINE SULFATE 4 MG/ML
4 INJECTION, SOLUTION INTRAMUSCULAR; INTRAVENOUS EVERY 4 HOURS PRN
Status: DISCONTINUED | OUTPATIENT
Start: 2021-08-17 | End: 2021-08-20 | Stop reason: HOSPADM

## 2021-08-17 RX ORDER — OXYBUTYNIN CHLORIDE 5 MG/1
5 TABLET ORAL DAILY
Status: DISCONTINUED | OUTPATIENT
Start: 2021-08-17 | End: 2021-08-20 | Stop reason: HOSPADM

## 2021-08-17 RX ORDER — NEOSTIGMINE METHYLSULFATE 1 MG/ML
INJECTION, SOLUTION INTRAVENOUS PRN
Status: DISCONTINUED | OUTPATIENT
Start: 2021-08-17 | End: 2021-08-17 | Stop reason: SDUPTHER

## 2021-08-17 RX ORDER — ACETAMINOPHEN 325 MG/1
650 TABLET ORAL EVERY 6 HOURS
Status: DISCONTINUED | OUTPATIENT
Start: 2021-08-17 | End: 2021-08-20 | Stop reason: HOSPADM

## 2021-08-17 RX ORDER — GLYCOPYRROLATE 1 MG/5 ML
SYRINGE (ML) INTRAVENOUS PRN
Status: DISCONTINUED | OUTPATIENT
Start: 2021-08-17 | End: 2021-08-17 | Stop reason: SDUPTHER

## 2021-08-17 RX ORDER — LABETALOL HYDROCHLORIDE 5 MG/ML
5 INJECTION, SOLUTION INTRAVENOUS EVERY 10 MIN PRN
Status: DISCONTINUED | OUTPATIENT
Start: 2021-08-17 | End: 2021-08-17

## 2021-08-17 RX ORDER — SODIUM CHLORIDE 9 MG/ML
25 INJECTION, SOLUTION INTRAVENOUS PRN
Status: DISCONTINUED | OUTPATIENT
Start: 2021-08-17 | End: 2021-08-17

## 2021-08-17 RX ORDER — FENTANYL CITRATE 50 UG/ML
INJECTION, SOLUTION INTRAMUSCULAR; INTRAVENOUS PRN
Status: DISCONTINUED | OUTPATIENT
Start: 2021-08-17 | End: 2021-08-17 | Stop reason: SDUPTHER

## 2021-08-17 RX ORDER — ONDANSETRON 2 MG/ML
INJECTION INTRAMUSCULAR; INTRAVENOUS PRN
Status: DISCONTINUED | OUTPATIENT
Start: 2021-08-17 | End: 2021-08-17 | Stop reason: SDUPTHER

## 2021-08-17 RX ORDER — SODIUM CHLORIDE 9 MG/ML
INJECTION, SOLUTION INTRAVENOUS CONTINUOUS PRN
Status: DISCONTINUED | OUTPATIENT
Start: 2021-08-17 | End: 2021-08-17 | Stop reason: SDUPTHER

## 2021-08-17 RX ORDER — LISINOPRIL 20 MG/1
20 TABLET ORAL DAILY
Status: DISCONTINUED | OUTPATIENT
Start: 2021-08-17 | End: 2021-08-20 | Stop reason: HOSPADM

## 2021-08-17 RX ORDER — SODIUM CHLORIDE 0.9 % (FLUSH) 0.9 %
5-40 SYRINGE (ML) INJECTION EVERY 12 HOURS SCHEDULED
Status: DISCONTINUED | OUTPATIENT
Start: 2021-08-17 | End: 2021-08-17

## 2021-08-17 RX ORDER — MIDAZOLAM HYDROCHLORIDE 1 MG/ML
INJECTION INTRAMUSCULAR; INTRAVENOUS PRN
Status: DISCONTINUED | OUTPATIENT
Start: 2021-08-17 | End: 2021-08-17 | Stop reason: SDUPTHER

## 2021-08-17 RX ORDER — CELECOXIB 100 MG/1
100 CAPSULE ORAL ONCE
Status: COMPLETED | OUTPATIENT
Start: 2021-08-17 | End: 2021-08-17

## 2021-08-17 RX ORDER — SODIUM CHLORIDE 0.9 % (FLUSH) 0.9 %
10 SYRINGE (ML) INJECTION PRN
Status: DISCONTINUED | OUTPATIENT
Start: 2021-08-17 | End: 2021-08-20 | Stop reason: HOSPADM

## 2021-08-17 RX ORDER — SODIUM CHLORIDE 9 MG/ML
INJECTION, SOLUTION INTRAVENOUS CONTINUOUS
Status: DISCONTINUED | OUTPATIENT
Start: 2021-08-17 | End: 2021-08-17

## 2021-08-17 RX ORDER — MEPERIDINE HYDROCHLORIDE 25 MG/ML
12.5 INJECTION INTRAMUSCULAR; INTRAVENOUS; SUBCUTANEOUS EVERY 5 MIN PRN
Status: DISCONTINUED | OUTPATIENT
Start: 2021-08-17 | End: 2021-08-17

## 2021-08-17 RX ORDER — LIDOCAINE HYDROCHLORIDE 20 MG/ML
INJECTION, SOLUTION INTRAVENOUS PRN
Status: DISCONTINUED | OUTPATIENT
Start: 2021-08-17 | End: 2021-08-17 | Stop reason: SDUPTHER

## 2021-08-17 RX ORDER — ROCURONIUM BROMIDE 10 MG/ML
INJECTION, SOLUTION INTRAVENOUS PRN
Status: DISCONTINUED | OUTPATIENT
Start: 2021-08-17 | End: 2021-08-17 | Stop reason: SDUPTHER

## 2021-08-17 RX ORDER — VANCOMYCIN HYDROCHLORIDE 1 G/20ML
INJECTION, POWDER, LYOPHILIZED, FOR SOLUTION INTRAVENOUS PRN
Status: DISCONTINUED | OUTPATIENT
Start: 2021-08-17 | End: 2021-08-17 | Stop reason: ALTCHOICE

## 2021-08-17 RX ORDER — OXYCODONE HYDROCHLORIDE 5 MG/1
5 TABLET ORAL EVERY 6 HOURS PRN
Qty: 28 TABLET | Refills: 0 | Status: SHIPPED | OUTPATIENT
Start: 2021-08-17 | End: 2021-08-24

## 2021-08-17 RX ORDER — ACETAMINOPHEN 500 MG
1000 TABLET ORAL ONCE
Status: COMPLETED | OUTPATIENT
Start: 2021-08-17 | End: 2021-08-17

## 2021-08-17 RX ORDER — PROMETHAZINE HYDROCHLORIDE 25 MG/ML
25 INJECTION, SOLUTION INTRAMUSCULAR; INTRAVENOUS PRN
Status: DISCONTINUED | OUTPATIENT
Start: 2021-08-17 | End: 2021-08-17

## 2021-08-17 RX ORDER — CALCIUM GLUCONATE 94 MG/ML
INJECTION, SOLUTION INTRAVENOUS PRN
Status: DISCONTINUED | OUTPATIENT
Start: 2021-08-17 | End: 2021-08-17 | Stop reason: ALTCHOICE

## 2021-08-17 RX ORDER — SODIUM CHLORIDE 0.9 % (FLUSH) 0.9 %
5-40 SYRINGE (ML) INJECTION PRN
Status: DISCONTINUED | OUTPATIENT
Start: 2021-08-17 | End: 2021-08-17

## 2021-08-17 RX ORDER — DEXAMETHASONE SODIUM PHOSPHATE 4 MG/ML
8 INJECTION, SOLUTION INTRA-ARTICULAR; INTRALESIONAL; INTRAMUSCULAR; INTRAVENOUS; SOFT TISSUE ONCE
Status: COMPLETED | OUTPATIENT
Start: 2021-08-17 | End: 2021-08-17

## 2021-08-17 RX ORDER — PROMETHAZINE HYDROCHLORIDE 25 MG/ML
INJECTION, SOLUTION INTRAMUSCULAR; INTRAVENOUS PRN
Status: DISCONTINUED | OUTPATIENT
Start: 2021-08-17 | End: 2021-08-17 | Stop reason: SDUPTHER

## 2021-08-17 RX ORDER — AMLODIPINE BESYLATE AND BENAZEPRIL HYDROCHLORIDE 5; 20 MG/1; MG/1
1 CAPSULE ORAL DAILY
Status: DISCONTINUED | OUTPATIENT
Start: 2021-08-17 | End: 2021-08-17 | Stop reason: CLARIF

## 2021-08-17 RX ORDER — DEXAMETHASONE SODIUM PHOSPHATE 10 MG/ML
INJECTION INTRAMUSCULAR; INTRAVENOUS PRN
Status: DISCONTINUED | OUTPATIENT
Start: 2021-08-17 | End: 2021-08-17 | Stop reason: SDUPTHER

## 2021-08-17 RX ORDER — SCOLOPAMINE TRANSDERMAL SYSTEM 1 MG/1
1 PATCH, EXTENDED RELEASE TRANSDERMAL ONCE
Status: DISCONTINUED | OUTPATIENT
Start: 2021-08-17 | End: 2021-08-17 | Stop reason: HOSPADM

## 2021-08-17 RX ORDER — SODIUM CHLORIDE 9 MG/ML
25 INJECTION, SOLUTION INTRAVENOUS PRN
Status: DISCONTINUED | OUTPATIENT
Start: 2021-08-17 | End: 2021-08-20 | Stop reason: HOSPADM

## 2021-08-17 RX ORDER — OXYCODONE HYDROCHLORIDE 10 MG/1
10 TABLET ORAL EVERY 4 HOURS PRN
Status: DISCONTINUED | OUTPATIENT
Start: 2021-08-17 | End: 2021-08-20 | Stop reason: HOSPADM

## 2021-08-17 RX ORDER — AMLODIPINE BESYLATE 5 MG/1
5 TABLET ORAL DAILY
Status: DISCONTINUED | OUTPATIENT
Start: 2021-08-17 | End: 2021-08-20 | Stop reason: HOSPADM

## 2021-08-17 RX ORDER — SODIUM CHLORIDE 0.9 % (FLUSH) 0.9 %
10 SYRINGE (ML) INJECTION EVERY 12 HOURS SCHEDULED
Status: DISCONTINUED | OUTPATIENT
Start: 2021-08-17 | End: 2021-08-20 | Stop reason: HOSPADM

## 2021-08-17 RX ORDER — SODIUM CHLORIDE 9 MG/ML
INJECTION, SOLUTION INTRAVENOUS CONTINUOUS
Status: ACTIVE | OUTPATIENT
Start: 2021-08-17 | End: 2021-08-18

## 2021-08-17 RX ORDER — SENNA AND DOCUSATE SODIUM 50; 8.6 MG/1; MG/1
1 TABLET, FILM COATED ORAL 2 TIMES DAILY
Status: DISCONTINUED | OUTPATIENT
Start: 2021-08-17 | End: 2021-08-20 | Stop reason: HOSPADM

## 2021-08-17 RX ADMIN — MEPERIDINE HYDROCHLORIDE 12.5 MG: 25 INJECTION, SOLUTION INTRAMUSCULAR; INTRAVENOUS; SUBCUTANEOUS at 10:56

## 2021-08-17 RX ADMIN — DEXAMETHASONE SODIUM PHOSPHATE 8 MG: 4 INJECTION, SOLUTION INTRAMUSCULAR; INTRAVENOUS at 06:30

## 2021-08-17 RX ADMIN — ACETAMINOPHEN 1000 MG: 500 TABLET ORAL at 06:30

## 2021-08-17 RX ADMIN — KETOROLAC TROMETHAMINE 15 MG: 30 INJECTION, SOLUTION INTRAMUSCULAR; INTRAVENOUS at 09:56

## 2021-08-17 RX ADMIN — SODIUM CHLORIDE: 9 INJECTION, SOLUTION INTRAVENOUS at 06:30

## 2021-08-17 RX ADMIN — SODIUM CHLORIDE: 9 INJECTION, SOLUTION INTRAVENOUS at 13:47

## 2021-08-17 RX ADMIN — FENTANYL CITRATE 100 MCG: 50 INJECTION, SOLUTION INTRAMUSCULAR; INTRAVENOUS at 08:31

## 2021-08-17 RX ADMIN — DOCUSATE SODIUM 50 MG AND SENNOSIDES 8.6 MG 1 TABLET: 8.6; 5 TABLET, FILM COATED ORAL at 13:34

## 2021-08-17 RX ADMIN — ASPIRIN 325 MG: 325 TABLET, COATED ORAL at 14:18

## 2021-08-17 RX ADMIN — ONDANSETRON HYDROCHLORIDE 4 MG: 2 INJECTION, SOLUTION INTRAMUSCULAR; INTRAVENOUS at 09:27

## 2021-08-17 RX ADMIN — LISINOPRIL 20 MG: 20 TABLET ORAL at 14:18

## 2021-08-17 RX ADMIN — Medication 2000 MG: at 16:41

## 2021-08-17 RX ADMIN — SODIUM CHLORIDE: 9 INJECTION, SOLUTION INTRAVENOUS at 09:39

## 2021-08-17 RX ADMIN — OXYCODONE HYDROCHLORIDE 10 MG: 10 TABLET ORAL at 16:57

## 2021-08-17 RX ADMIN — TRANEXAMIC ACID 1000 MG: 1 INJECTION, SOLUTION INTRAVENOUS at 08:40

## 2021-08-17 RX ADMIN — PROPOFOL 140 MG: 10 INJECTION, EMULSION INTRAVENOUS at 08:31

## 2021-08-17 RX ADMIN — LIDOCAINE HYDROCHLORIDE 80 MG: 20 INJECTION, SOLUTION INTRAVENOUS at 08:31

## 2021-08-17 RX ADMIN — Medication 10 ML: at 21:29

## 2021-08-17 RX ADMIN — Medication 2000 MG: at 08:37

## 2021-08-17 RX ADMIN — NEOSTIGMINE METHYLSULFATE 3 MG: 1 INJECTION, SOLUTION INTRAVENOUS at 09:56

## 2021-08-17 RX ADMIN — MIDAZOLAM 1 MG: 1 INJECTION INTRAMUSCULAR; INTRAVENOUS at 08:28

## 2021-08-17 RX ADMIN — ROCURONIUM BROMIDE 40 MG: 10 INJECTION, SOLUTION INTRAVENOUS at 08:31

## 2021-08-17 RX ADMIN — PROPOFOL 30 MG: 10 INJECTION, EMULSION INTRAVENOUS at 10:00

## 2021-08-17 RX ADMIN — DOCUSATE SODIUM 50 MG AND SENNOSIDES 8.6 MG 1 TABLET: 8.6; 5 TABLET, FILM COATED ORAL at 21:29

## 2021-08-17 RX ADMIN — DEXAMETHASONE SODIUM PHOSPHATE 10 MG: 10 INJECTION INTRAMUSCULAR; INTRAVENOUS at 08:31

## 2021-08-17 RX ADMIN — SODIUM CHLORIDE: 9 INJECTION, SOLUTION INTRAVENOUS at 08:22

## 2021-08-17 RX ADMIN — TRANEXAMIC ACID 1000 MG: 1 INJECTION, SOLUTION INTRAVENOUS at 11:45

## 2021-08-17 RX ADMIN — FENTANYL CITRATE 25 MCG: 50 INJECTION, SOLUTION INTRAMUSCULAR; INTRAVENOUS at 10:03

## 2021-08-17 RX ADMIN — MIDAZOLAM 1 MG: 1 INJECTION INTRAMUSCULAR; INTRAVENOUS at 08:22

## 2021-08-17 RX ADMIN — SODIUM CHLORIDE, PRESERVATIVE FREE 10 ML: 5 INJECTION INTRAVENOUS at 11:56

## 2021-08-17 RX ADMIN — PROMETHAZINE HYDROCHLORIDE 6.25 MG: 25 INJECTION INTRAMUSCULAR; INTRAVENOUS at 09:56

## 2021-08-17 RX ADMIN — Medication 0.5 MG: at 09:56

## 2021-08-17 RX ADMIN — AMLODIPINE BESYLATE 5 MG: 5 TABLET ORAL at 16:40

## 2021-08-17 RX ADMIN — SODIUM CHLORIDE: 9 INJECTION, SOLUTION INTRAVENOUS at 21:29

## 2021-08-17 RX ADMIN — ACETAMINOPHEN 650 MG: 325 TABLET ORAL at 13:33

## 2021-08-17 RX ADMIN — OXYCODONE HYDROCHLORIDE 10 MG: 10 TABLET ORAL at 21:29

## 2021-08-17 RX ADMIN — CELECOXIB 100 MG: 100 CAPSULE ORAL at 06:31

## 2021-08-17 ASSESSMENT — PULMONARY FUNCTION TESTS
PIF_VALUE: 2
PIF_VALUE: 0
PIF_VALUE: 15
PIF_VALUE: 18
PIF_VALUE: 18
PIF_VALUE: 2
PIF_VALUE: 15
PIF_VALUE: 18
PIF_VALUE: 17
PIF_VALUE: 15
PIF_VALUE: 3
PIF_VALUE: 19
PIF_VALUE: 18
PIF_VALUE: 18
PIF_VALUE: 16
PIF_VALUE: 19
PIF_VALUE: 5
PIF_VALUE: 19
PIF_VALUE: 2
PIF_VALUE: 17
PIF_VALUE: 19
PIF_VALUE: 18
PIF_VALUE: 19
PIF_VALUE: 18
PIF_VALUE: 0
PIF_VALUE: 17
PIF_VALUE: 0
PIF_VALUE: 16
PIF_VALUE: 18
PIF_VALUE: 0
PIF_VALUE: 18
PIF_VALUE: 19
PIF_VALUE: 2
PIF_VALUE: 18
PIF_VALUE: 0
PIF_VALUE: 19
PIF_VALUE: 17
PIF_VALUE: 18
PIF_VALUE: 17
PIF_VALUE: 35
PIF_VALUE: 17
PIF_VALUE: 19
PIF_VALUE: 15
PIF_VALUE: 26
PIF_VALUE: 19
PIF_VALUE: 17
PIF_VALUE: 19
PIF_VALUE: 18
PIF_VALUE: 16
PIF_VALUE: 14
PIF_VALUE: 2
PIF_VALUE: 15
PIF_VALUE: 18
PIF_VALUE: 2
PIF_VALUE: 2
PIF_VALUE: 19
PIF_VALUE: 19
PIF_VALUE: 18
PIF_VALUE: 19
PIF_VALUE: 15
PIF_VALUE: 2
PIF_VALUE: 18
PIF_VALUE: 17
PIF_VALUE: 2
PIF_VALUE: 18
PIF_VALUE: 19
PIF_VALUE: 18
PIF_VALUE: 18
PIF_VALUE: 15
PIF_VALUE: 18
PIF_VALUE: 2
PIF_VALUE: 18
PIF_VALUE: 3
PIF_VALUE: 17
PIF_VALUE: 0
PIF_VALUE: 19
PIF_VALUE: 15
PIF_VALUE: 19
PIF_VALUE: 21
PIF_VALUE: 17
PIF_VALUE: 15
PIF_VALUE: 18
PIF_VALUE: 15
PIF_VALUE: 15
PIF_VALUE: 18
PIF_VALUE: 0
PIF_VALUE: 18
PIF_VALUE: 15
PIF_VALUE: 18
PIF_VALUE: 0
PIF_VALUE: 18
PIF_VALUE: 19
PIF_VALUE: 15
PIF_VALUE: 17
PIF_VALUE: 19
PIF_VALUE: 18
PIF_VALUE: 0
PIF_VALUE: 18
PIF_VALUE: 19
PIF_VALUE: 18
PIF_VALUE: 4
PIF_VALUE: 18
PIF_VALUE: 0
PIF_VALUE: 17
PIF_VALUE: 18
PIF_VALUE: 17
PIF_VALUE: 3

## 2021-08-17 ASSESSMENT — PAIN SCALES - GENERAL
PAINLEVEL_OUTOF10: 7
PAINLEVEL_OUTOF10: 8
PAINLEVEL_OUTOF10: 0
PAINLEVEL_OUTOF10: 0
PAINLEVEL_OUTOF10: 6
PAINLEVEL_OUTOF10: 3
PAINLEVEL_OUTOF10: 0
PAINLEVEL_OUTOF10: 6
PAINLEVEL_OUTOF10: 0

## 2021-08-17 ASSESSMENT — PAIN DESCRIPTION - LOCATION
LOCATION: HIP
LOCATION: HIP

## 2021-08-17 ASSESSMENT — PAIN DESCRIPTION - FREQUENCY
FREQUENCY: INTERMITTENT
FREQUENCY: CONTINUOUS

## 2021-08-17 ASSESSMENT — PAIN DESCRIPTION - PROGRESSION
CLINICAL_PROGRESSION: GRADUALLY IMPROVING
CLINICAL_PROGRESSION: NOT CHANGED
CLINICAL_PROGRESSION: GRADUALLY WORSENING

## 2021-08-17 ASSESSMENT — PAIN DESCRIPTION - PAIN TYPE
TYPE: SURGICAL PAIN
TYPE: SURGICAL PAIN;ACUTE PAIN

## 2021-08-17 ASSESSMENT — PAIN DESCRIPTION - ONSET
ONSET: GRADUAL
ONSET: ON-GOING

## 2021-08-17 ASSESSMENT — PAIN DESCRIPTION - ORIENTATION
ORIENTATION: RIGHT
ORIENTATION: RIGHT

## 2021-08-17 ASSESSMENT — PAIN DESCRIPTION - DESCRIPTORS
DESCRIPTORS: DISCOMFORT
DESCRIPTORS: DISCOMFORT

## 2021-08-17 ASSESSMENT — LIFESTYLE VARIABLES: SMOKING_STATUS: 0

## 2021-08-17 ASSESSMENT — PAIN - FUNCTIONAL ASSESSMENT: PAIN_FUNCTIONAL_ASSESSMENT: 0-10

## 2021-08-17 NOTE — OP NOTE
Operative Note      Patient: Sharda Ovalle  YOB: 1945  MRN: [id number]    Date of Procedure: 8/17/2021    Pre-Op Diagnosis: OSTEOARTHRITIS    Post-Op Diagnosis:  Severe right hip osteroarthritis       Procedure(s):  HIP TOTAL ARTHROPLASTY----STRYKERRight    Surgeon(s):  Melody Perez MD    Assistant:   Resident: Shirley Covarrubias DO; Isacc Baetman DO    Anesthesia: General    Estimated Blood Loss (mL): 253    Complications: None    Specimens:   ID Type Source Tests Collected by Time Destination   A : RIGHT FEMORAL HEAD Bone Hip SURGICAL PATHOLOGY Melody Perez MD 8/17/2021 0835        Implants:  Implant Name Type Inv. Item Serial No.  Lot No. LRB No. Used Action   SHELL ACET CLUS HOLE C 46 MM TRIDENT II  SHELL ACET CLUS HOLE C 46 MM TRIDENT II  [employer] [facility] [id number] Right 1 Implanted   HEAD FEM [id number] +0MM OFFSET CO CHROM LFIT V40 TAPR REV  HEAD FEM [id number] +0MM OFFSET CO CHROM LFIT V40 TAPR REV  [employer] [facility] [id number] Right 1 Implanted   STEM FEM SZ 3 L102MM NK L30MM 38MM OFFSET 127DEG HIP TI  STEM FEM SZ 3 L102MM NK L30MM 38MM OFFSET 127DEG HIP TI  [employer] [facility] [id number] Right 1 Implanted   LINER ACET SZ C OD48MM ID32MM THK4. 9MM 0DEG HIP X3  LINER ACET SZ C OD48MM ID32MM THK4. 9MM 0DEG HIP X3  [employer] [facility] [id number] Right 1 Implanted   SCREW BNE LP 6.5X45 MM HEX TRITANIUM NS TRIDENT II  SCREW BNE LP 6.5X45 MM HEX TRITANIUM NS TRIDENT II  [employer] [facility] [id number] Right 1 Implanted         Drains:   Urethral Catheter Double-lumen;Non-latex 16 fr (Active)       Detailed Description of Procedure:     Brief Hospital Course:  Sharda Ovalle is a patient known to Melody Perez MD practice with persistent complaints of Right hip pain. Hip pain has failed to be relieved by non-operative conservative measures, and has began affecting daily activities of living.  After examination of the patient, review of the radiologic studies, and appropriate pre-operative risk assessment, Juan F Augustin MD recommended Right hip arthroplasty, which the patient was agreeable towards. Operative Course: Outside the operating suite, the patient was seen and identified. The operative site was marked and identified as appropriate by the patient, staff, surgeon, and Anesthesia. The patient was taken into the operating room, placed on the operative table, and placed under the appropriate amount of sedation under the care of the anesthesia team. The patient was placed into a lateral decubitus position. All bony prominences and neurovascular structures were well padded and protected. The operative site was then prepped with iodinated prep and draped in a standard sterile fashion. An incision was made over the lateral trochanteric region and carried down to the level of the fascia wild maintaining appropriate hemostasis with electrocautery. A small rent was placed in the fascia wild using a knife. Schwartz scissors were used to extend the fascia wild incision in line with its fibers to the length of the skin incision. A Charnley retractor was then placed in the anterior and posterior margin of the tensor fascia wild incision, taking care not to violate any neurovascular structures. The anterior one-third of the gluteus medius tendon was identified. In line with its fibers, it was reflected using electrocautery off its trochanteric insertion. The gluteus medius and minimus tendons were reflected anteriorly down to the level of the hip capsule. The hip capsule was T'd up to the acetabulum and around the posterior medial and inferior aspects of the hip. The hip was dislocated carefully. An oscillating saw was then used to make the femoral neck cut at a 45-degree angle, 1 cm proximal to the lesser trochanter. The femoral head was taken for sizing and specimen. At this point, the acetabulum was then inspected.  Posterior and anterior acetabular retractors were then placed, taking care not to violate any neurovascular structures. All bony fragments and soft tissue interposed were then sharply excised. The labrum was sharply debrided. Preliminarily, osteophytes were removed with a rongeour to better delineate her anatomic cup. She had severely dysplastic osteophytes on the superior and anterior rims. Reaming of the acetabulum was started, and carried sequentially up to the appropriate size which happened to be a 46mm. Copious irrigation was performed of the wound. An appropriately sized acetabular component was then impacted into the appropriate position. Using a safe zone, a screw was then placed in the superior quadrant to better stabilize the acetabular cup. A 40mm screw was placed. Using a curved osteotome, remaining posterior inferior osteophytes were then removed making sure not to violate the cup position. Then, an appropriate polyethylene liner was impacted, and it was found to be appropriately stable afterward. Attention was then turned to the femur. A Woody retractor was placed under the femoral neck cut. A box osteotome was introduced into the femoral neck which was removed. Then a T-handle canal finder was then introduced and removed. Broaching began at a size 0 and was carried up sequentially to an appropriately sized broach, where we found appropriate stability, axially and rotationally which was a size 3. The broach was then removed. The corresponding sized femoral component was then impacted in the appropriate position, which was found to have appropriate stable fit. An appropriately sized femoral head trial was then placed. The hip was reduced, taken through a range of motion, and was found to be appropriately stable. The hip was then dislocated. The femoral head trial component was then removed. The actual femoral head component was impacted into the appropriate position.      The hip was then reduced, taken through a range of motion, and was found to be appropriately stable. Copious irrigation was performed of the joint. Platelet-rich plasma gel and vancomycin was then injected into the joint. Also, 0 Ethibond was used to reapproximate the gluteus medius and minimus tendon to its trochanteric insertion. Copious irrigation was performed once more. The tensor fascia wild was then closed with an #0 Vicryl and stratifix in a running fashion. Copious irrigation was performed once more. Platelet-poor plasma gel was injected into the subcutaneous tissues. #0 and 2-0 Vicryl were used to close the subcutaneous layer. Staples were used for the skin. A sterile FITO dressing was placed over the wound. The patient was awakened from anesthesia, transferred to a hospital bed, and taken to the PACU in stable condition. Disposition: The patient was taken to PACU in stable condition. Once stable, the patient will be transferred to the floor. Orders have been provided to begin physical therapy, weight bear as tolerated Right lower extremity. Patient received a dose of ancef and TXA preoperatively. We will continue this for 24 hours postoperatively for infection prophylaxis. The patient will also be started on Aspirin 325 daily for DVT prophylaxis. We have consulted  and case management for discharge planning and consulted the PCP for medical management. It should be noted that Dr. Omkar Mar was present for the entirety of the procedure.     Electronically signed by Oz Gross DO on 8/17/2021 at 10:10 AM

## 2021-08-17 NOTE — BRIEF OP NOTE
Brief Postoperative Note      Patient: Danny Cooley  YOB: 1945  MRN: [id number]    Date of Procedure: 8/17/2021    Pre-Op Diagnosis: OSTEOARTHRITIS    Post-Op Diagnosis: Severe right sided hip osteoarthritis       Procedure(s):  HIP TOTAL ARTHROPLASTY----KANCHAN    Surgeon(s):  Bonnie Castellon MD    Assistant:  Resident: Rowan Reynoso DO; Laila Lynne DO    Anesthesia: General    Estimated Blood Loss (mL): 883     Complications: None    Specimens:   ID Type Source Tests Collected by Time Destination   A : RIGHT FEMORAL HEAD Bone Hip SURGICAL PATHOLOGY Bonnie Castellon MD 8/17/2021 8974        Implants:  Implant Name Type Inv. Item Serial No.  Lot No. LRB No. Used Action   SHELL ACET CLUS HOLE C 46 MM TRIDENT II  SHELL ACET CLUS HOLE C 46 MM TRIDENT II  [employer] [facility] [id number] Right 1 Implanted   HEAD FEM [id number] +0MM OFFSET CO CHROM LFIT V40 TAPR REV  HEAD FEM [id number] +0MM OFFSET CO CHROM LFIT V40 TAPR REV  [employer] [facility] [id number] Right 1 Implanted   STEM FEM SZ 3 L102MM NK L30MM 38MM OFFSET 127DEG HIP TI  STEM FEM SZ 3 L102MM NK L30MM 38MM OFFSET 127DEG HIP TI  [employer] [facility] [id number] Right 1 Implanted   LINER ACET SZ C OD48MM ID32MM THK4. 9MM 0DEG HIP X3  LINER ACET SZ C OD48MM ID32MM THK4. 9MM 0DEG HIP X3  [employer] [facility] [id number] Right 1 Implanted   SCREW BNE LP 6.5X45 MM HEX TRITANIUM NS TRIDENT II  SCREW BNE LP 6.5X45 MM HEX TRITANIUM NS TRIDENT II  [employer] [facility] [id number] Right 1 Implanted         Drains:   Urethral Catheter Double-lumen;Non-latex 16 fr (Active)       Findings: See operative note    Electronically signed by Rowan Reynoso DO on 8/17/2021 at 10:18 AM

## 2021-08-17 NOTE — PROGRESS NOTES
MICHELE Fowler messaged via perfect serve for medical management she is on for [address] [place] [address].  Ashley Osler, RN

## 2021-08-17 NOTE — DISCHARGE INSTRUCTIONS
[facility]    DISCHARGE INSTRUCTIONS FOR TOTAL JOINT REPLACEMENT      · Prevent blood clots - you are at risk for six weeks post-op for DVT (Deep vein    thrombosis and / or PE (Pulmonary Embolism)    · Take frequent walks around  your home. · Be careful outdoors- watch for uneven ground and pavement, curbs and holes. · Gradually increase your activity to prevent fatigue. · When at rest (sitting in a chair or lying down,resting) continue circulation exercises at least every hour - foot flaps, ankle rolls, quad and glut sets, 10 cycles each. · You will continue a blood thinner at home. · Aspirin - Take one enteric coated 325 mg tablet - twice a day    · Incision Care:        · Remove Aquaceldressing on post operative day 7  · You may shower - REMEMBER - Incision line is healing and tender - protect from [address] water. Let the water roll off your incision. No need to wash incision with soap and water, but it is permissible to use an antibacterial soap. Pat incision dry with clean hand towel or let air dry. · Do not apply creams, liniments, lotions or ointments directly on incision line until after staples have been removed (approximately 10 to 14 days after surgery) and the incision line / staple insertion sites are free of drainage and scabbing. · If incision is draining, keep it covered with sterile gauze. Change dressing daily and each time you shower. · Do not touch incision line with your fingers or scratch incision with your fingernails (your fingernails harbor germs and bacteria). · Do not allow pets near your incision - do not allow pets to smell or lick your incision. · Signs and symptoms to report to your doctor:    · Persistent drainage from the incision. · Increased redness or swelling of the incision or operative extremity. · Increased or excessive pain at the incision site or in the thigh or calf.   · Fever over 101 degrees with chills (take your temperature at home and record). · Go to Emergency Room if you experience any of the following:    · Chest pain or tightness  · Shortness of breath or difficulty breathing  · Anxiety or feeling of impending doom     Note: The above are signs and symptoms of a blood clot in your lungs. Although this is rare, it can occur. You must be evaluated in the Emergency Room. · Rehabilitation:  · Continue exercises at home as instructed by the Physical and Occupational Therapists. · Continue Hip / Knee Precautions until cleared by Dr. Ananya Soler. · Begin a formal Outpatient Physical Therapy program as soon as you are able.      · Once you are home:  · Call office for any questions, concerns or problems at [phone] extension [phone]    · First post op visit will include:  · Staple removal  · X-ray of operative joint (may be ordered by your surgeon)  · Exam by your surgeon   ·  Prescription for Outpatient Physical Therapy

## 2021-08-17 NOTE — ANESTHESIA POSTPROCEDURE EVALUATION
Department of Anesthesiology  Postprocedure Note    Patient: Megan Rater  MRN: [id number]  YOB: 1945  Date of evaluation: 8/17/2021  Time:  3:01 PM     Procedure Summary     Date: 08/17/21 Room / Location: [facility] OR 08 / [facility]    Anesthesia Start: 0334 Anesthesia Stop: 1020    Procedure: HIP TOTAL ARTHROPLASTY----KANCHAN (Right Hip) Diagnosis: (OSTEOARTHRITIS)    Surgeons: Carlos Leon MD Responsible Provider: William Dean MD    Anesthesia Type: general ASA Status: 3          Anesthesia Type: general    Kathie Phase I: Kathie Score: 10    Kathie Phase II:      Last vitals: Reviewed and per EMR flowsheets.        Anesthesia Post Evaluation    Patient location during evaluation: PACU  Patient participation: complete - patient participated  Level of consciousness: awake  Airway patency: patent  Nausea & Vomiting: no nausea and no vomiting  Complications: no  Cardiovascular status: hemodynamically stable  Respiratory status: acceptable  Hydration status: stable

## 2021-08-17 NOTE — ANESTHESIA PRE PROCEDURE
Department of Anesthesiology  Preprocedure Note       Name:  Prosper Schultz   Age:  67 y.o.  :  1945                                          MRN:  [id number]         Date:  2021      Surgeon: Muriel Nesbitt):  Abhijeet Gonzalez MD    Procedure: Procedure(s):  HIP TOTAL ARTHROPLASTY----KANCHAN    Medications prior to admission:   Prior to Admission medications    Medication Sig Start Date End Date Taking? Authorizing Provider   oxybutynin (DITROPAN) 5 MG tablet Take 5 mg by mouth daily   Yes Historical Provider, MD   ammonium lactate (AMLACTIN) 12 % cream Apply topically as needed for Dry Skin  20  Yes Historical Provider, MD   amLODIPine-benazepril (LOTREL) 5-20 MG per capsule Take 1 capsule by mouth daily.  14  Yes Historical Provider, MD   ibuprofen (ADVIL;MOTRIN) 800 MG tablet Take 1 tablet by mouth every 8 hours as needed  14   Historical Provider, MD       Current medications:    Current Facility-Administered Medications   Medication Dose Route Frequency Provider Last Rate Last Admin    0.9 % sodium chloride infusion   Intravenous Continuous Abhijeet Gonzalez  mL/hr at 21 0630 New Bag at 21 0630    sodium chloride flush 0.9 % injection 5-40 mL  5-40 mL Intravenous 2 times per day Abhijeet Gonzalez MD        sodium chloride flush 0.9 % injection 5-40 mL  5-40 mL Intravenous PRN Abhijeet Gonzalez MD        0.9 % sodium chloride infusion  25 mL Intravenous PRN Abhijeet Gonzalez MD        ceFAZolin (ANCEF) 2000 mg in sterile water 20 mL IV syringe  2,000 mg Intravenous On Call to [address] [facility]        tranexamic acid (CYKLOKAPRON) 1,000 mg in dextrose 5 % 100 mL IVPB  1,000 mg Intravenous On Call to [address] [facility]        meperidine (DEMEROL) injection 12.5 mg  12.5 mg Intravenous Q5 Min PRN Randy Perrin MD        promethazine (PHENERGAN) injection 25 mg  25 mg Intravenous PRN Randy Perrin MD        labetalol (NORMODYNE;TRANDATE) injection 5 mg  5 mg Intravenous Q10 Min PRN Narayan Montiel MD        HYDROmorphone (DILAUDID) injection 0.25 mg  0.25 mg Intravenous Q5 Min PRN Narayan Montiel MD        HYDROmorphone (DILAUDID) injection 0.5 mg  0.5 mg Intravenous Q5 Min PRN Narayan Montiel MD           Allergies: Allergies   Allergen Reactions    Darvocet [Propoxyphene N-Acetaminophen]     Darvon [Propoxyphene Hcl]        Problem List:  There is no problem list on file for this patient. Past Medical History:        Diagnosis Date    Hypertension        Past Surgical History:        Procedure Laterality Date    ROTATOR CUFF REPAIR Bilateral        Social History:    Social History     Tobacco Use    Smoking status: Never Smoker    Smokeless tobacco: Never Used   Substance Use Topics    Alcohol use: Yes     Comment: social                                Counseling given: Not Answered      Vital Signs (Current):   Vitals:    08/17/21 0608 08/17/21 0620   BP:  (!) 155/70   Pulse:  74   Resp:  20   Temp:  36.3 °C (97.4 °F)   TempSrc:  Temporal   SpO2:  100%   Weight: 157 lb (71.2 kg)    Height: 5' 2\" (1.575 m)                                               BP Readings from Last 3 Encounters:   08/17/21 (!) 155/70   08/10/21 (!) 157/67   08/26/20 (!) 155/82       NPO Status: Time of last liquid consumption: 2100                        Time of last solid consumption: 2100                        Date of last liquid consumption: 08/16/21                        Date of last solid food consumption: 08/16/21    BMI:   Wt Readings from Last 3 Encounters:   08/17/21 157 lb (71.2 kg)   08/09/21 157 lb (71.2 kg)   07/14/21 150 lb (68 kg)     Body mass index is 28.72 kg/m².     CBC:   Lab Results   Component Value Date    WBC 6.5 08/10/2021    RBC 4.42 08/10/2021    HGB 12.7 08/10/2021    HCT 40.6 08/10/2021    MCV 91.9 08/10/2021    RDW 12.3 08/10/2021     08/10/2021       CMP:   Lab Results   Component Value Date     08/10/2021    K 4.5 08/10/2021     08/10/2021    CO2 25 08/10/2021    BUN 13 08/10/2021    CREATININE 0.7 08/10/2021    GFRAA >60 08/10/2021    LABGLOM >60 08/10/2021    GLUCOSE 90 08/10/2021    GLUCOSE 69 06/04/2012    PROT 7.3 08/10/2021    CALCIUM 9.5 08/10/2021    BILITOT 0.4 08/10/2021    ALKPHOS 112 08/10/2021    AST 24 08/10/2021    ALT 12 08/10/2021       POC Tests: No results for input(s): POCGLU, POCNA, POCK, POCCL, POCBUN, POCHEMO, POCHCT in the last 72 hours. Coags: No results found for: PROTIME, INR, APTT    HCG (If Applicable): No results found for: PREGTESTUR, PREGSERUM, HCG, HCGQUANT     ABGs: No results found for: PHART, PO2ART, JHC8NMV, TYB2EDF, BEART, W1OMBGRV     Type & Screen (If Applicable):  No results found for: LABABO, LABRH    Drug/Infectious Status (If Applicable):  No results found for: HIV, HEPCAB    COVID-19 Screening (If Applicable): No results found for: COVID19    ECHO 9/28/20   Summary   Normal left ventricle size and systolic function. Ejection fraction is visually estimated at 60-65%. Normal left ventricle wall thickness. No regional wall motion abnormalities seen. Indeterminate diastolic function. The left atrium is severely dilated. Normal right ventricular size and function. TAPSE 21 mm. Physiologic and/or trace mitral regurgitation is present. No hemodynamically significant aortic stenosis is present. Mild-to-moderate aortic regurgitation is noted. Physiologic and/or trace tricuspid regurgitation. RVSP is 30 mmHg. Normal estimated PA systolic pressure. No evidence for hemodynamically significant pericardial effusion. No previous echo for comparison. EKG 8/10/21  Normal sinus rhythm  Voltage criteria for left ventricular hypertrophy  Abnormal ECG         Anesthesia Evaluation  Patient summary reviewed   history of anesthetic complications: PONV.   Airway: Mallampati: III  TM distance: <3 FB   Neck ROM: full  Mouth opening: > = 3 FB Dental:          Pulmonary:Negative Pulmonary ROS breath sounds clear to auscultation      (-) not a current smoker                           Cardiovascular:    (+) hypertension:, valvular problems/murmurs: AI,       ECG reviewed  Rhythm: regular  Rate: normal  Echocardiogram reviewed         Beta Blocker:  Not on Beta Blocker         Neuro/Psych:   Negative Neuro/Psych ROS              GI/Hepatic/Renal:   (+) GERD: well controlled,           Endo/Other:    (+) : arthritis: OA., .                 Abdominal:             Vascular: negative vascular ROS. Other Findings:             Anesthesia Plan      general     ASA 3       Induction: intravenous. MIPS: Postoperative opioids intended and Prophylactic antiemetics administered. Anesthetic plan and risks discussed with patient. Use of blood products discussed with patient whom consented to blood products. Plan discussed with CRNA and attending. Galindo Crews RN   8/17/2021    Pt seen, examined, chart reviewed, plan discussed.   William Dean MD  8/17/2021  6:59 AM

## 2021-08-17 NOTE — CONSULTS
Hospital Medicine  Consult History & Physical        Reason for consult:  Medical management    Date of Service: Pt seen/examined in consultation on 8/17/2021    History Of Present Illness:    Ms. Sepideh Bruce, a 67y.o. year old female  who  has a past medical history of Hypertension. Patient presents today for a scheduled total right hip arthroplasty. We are asked to see/evaluate the patient for medical management. She states that her only known medical problem is hypertension for which she is on medication. She states that she follows with her primary care doctor regularly. She has no history of smoking. Past Medical History:        Diagnosis Date    Hypertension        Past Surgical History:        Procedure Laterality Date    ROTATOR CUFF REPAIR Bilateral        Medications Prior to Admission:    Prior to Admission medications    Medication Sig Start Date End Date Taking? Authorizing Provider   oxyCODONE (ROXICODONE) 5 MG immediate release tablet Take 1 tablet by mouth every 6 hours as needed for Pain for up to 7 days. Intended supply: 7 days. Take lowest dose possible to manage pain 8/17/21 8/24/21 Yes Alley Hawthorne DO   aspirin 325 MG EC tablet Take 1 tablet by mouth 2 times daily for 28 days 8/17/21 9/14/21 Yes Alley Hawthorne DO   oxybutynin (DITROPAN) 5 MG tablet Take 5 mg by mouth daily   Yes Historical Provider, MD   ammonium lactate (AMLACTIN) 12 % cream Apply topically as needed for Dry Skin  5/19/20  Yes Historical Provider, MD   amLODIPine-benazepril (LOTREL) 5-20 MG per capsule Take 1 capsule by mouth daily. 4/12/14  Yes Historical Provider, MD   ibuprofen (ADVIL;MOTRIN) 800 MG tablet Take 1 tablet by mouth every 8 hours as needed  4/12/14   Historical Provider, MD       Allergies:  Darvocet [propoxyphene n-acetaminophen] and Darvon [propoxyphene hcl]    Social History:      TOBACCO:   reports that she has never smoked.  She has never used smokeless tobacco.  ETOH: reports current alcohol use. Family History:   Grandmother- Cancer of unknown type, DM    REVIEW OF SYSTEMS:   Pertinent positives as noted in the HPI. All other systems reviewed and negative. PHYSICAL EXAM:  BP (!) 168/83   Pulse 92   Temp 99.1 °F (37.3 °C)   Resp 23   Ht 5' 2\" (1.575 m)   Wt 157 lb (71.2 kg)   SpO2 100%   BMI 28.72 kg/m²   General appearance: Elderly female in no apparent distress, appears stated age and cooperative. HEENT: Normal cephalic, atraumatic without obvious deformity. Pupils equal, round, and reactive to light. Extra ocular muscles intact. Conjunctivae/corneas clear. Neck: Supple, with full range of motion. No jugular venous distention. Trachea midline. Respiratory:  Clear to auscultation bilaterally. No apparent distress. Cardiovascular:  Regular rate and rhythm. S1, S2 without murmurs, rubs, or gallops. PV: Brisk capillary refill. +2 pedal and radial pulses bilaterally. No clubbing or cyanosis. + 1 pitting edema of bilateral lower extremities. Abdomen: Soft, non-tender, non-distended. +BS  Musculoskeletal: No obvious deformities or erythematous or edematous joints. Skin: Normal skin color. Operative site covered with dressing that is CDI  Neurologic:  Neurovascularly intact without any focal sensory/motor deficits. Cranial nerves: II-XII intact, grossly non-focal.  Psychiatric: Alert and oriented, thought content appropriate, normal insight    Labs:     CBC:   No results for input(s): WBC, RBC, HGB, HCT, MCV, RDW, PLT in the last 72 hours. BMP: No results for input(s): NA, K, CL, CO2, BUN, CREATININE, MG, PHOS in the last 72 hours. Invalid input(s): CA  LFT:  No results for input(s): PROT, ALB, ALKPHOS, ALT, AST, BILITOT, AMYLASE, LIPASE in the last 72 hours. CE:  No results for input(s): [place] in the last 72 hours. PT/INR: No results for input(s): INR, APTT in the last 72 hours.   BNP: No results for input(s): BNP in the last 72 hours. Hgb A1C:   Lab Results   Component Value Date    LABA1C 5.4 07/06/2017     No results found for: EAG  ESR: No results found for: SEDRATE  CRP: No results found for: CRP  D Dimer: No results found for: DDIMER  Folate and B12: No results found for: EDLPLUGH06, No results found for: FOLATE  Lactic Acid:   Lab Results   Component Value Date    LACTA 1.2 08/25/2020     Thyroid Studies: No results found for: TSH, F1AOSVI, V2UZTSA, THYROIDAB      ASSESSMENT:  Right hip osteoarthritis status post total arthroplasty  Hypertension    PLAN:  Reorder home medications  Basic labs   Rest per attending    Thanks for allowing us to participate in the care of Danny Cooley. We will sign off. Please do not hesitate to contact us if needed.    +++++++++++++++++++++++++++++++++++++++++++++++++  Breezy Bragg[address]  +++++++++++++++++++++++++++++++++++++++++++++++++  NOTE: This report was transcribed using voice recognition software. Every effort was made to ensure accuracy; however, inadvertent computerized transcription errors may be present.

## 2021-08-18 PROCEDURE — 97530 THERAPEUTIC ACTIVITIES: CPT

## 2021-08-18 PROCEDURE — 99024 POSTOP FOLLOW-UP VISIT: CPT | Performed by: ORTHOPAEDIC SURGERY

## 2021-08-18 PROCEDURE — G0378 HOSPITAL OBSERVATION PER HR: HCPCS

## 2021-08-18 PROCEDURE — 6370000000 HC RX 637 (ALT 250 FOR IP): Performed by: STUDENT IN AN ORGANIZED HEALTH CARE EDUCATION/TRAINING PROGRAM

## 2021-08-18 PROCEDURE — 97161 PT EVAL LOW COMPLEX 20 MIN: CPT

## 2021-08-18 PROCEDURE — 6360000002 HC RX W HCPCS: Performed by: STUDENT IN AN ORGANIZED HEALTH CARE EDUCATION/TRAINING PROGRAM

## 2021-08-18 PROCEDURE — 1200000000 HC SEMI PRIVATE

## 2021-08-18 PROCEDURE — 2580000003 HC RX 258: Performed by: STUDENT IN AN ORGANIZED HEALTH CARE EDUCATION/TRAINING PROGRAM

## 2021-08-18 PROCEDURE — 97165 OT EVAL LOW COMPLEX 30 MIN: CPT

## 2021-08-18 RX ADMIN — OXYCODONE HYDROCHLORIDE 10 MG: 10 TABLET ORAL at 17:20

## 2021-08-18 RX ADMIN — Medication 2000 MG: at 01:00

## 2021-08-18 RX ADMIN — OXYCODONE HYDROCHLORIDE 10 MG: 10 TABLET ORAL at 09:34

## 2021-08-18 RX ADMIN — Medication 10 ML: at 21:13

## 2021-08-18 RX ADMIN — ASPIRIN 325 MG: 325 TABLET, COATED ORAL at 01:04

## 2021-08-18 RX ADMIN — ASPIRIN 325 MG: 325 TABLET, COATED ORAL at 21:13

## 2021-08-18 RX ADMIN — ACETAMINOPHEN 650 MG: 325 TABLET ORAL at 12:50

## 2021-08-18 RX ADMIN — OXYBUTYNIN CHLORIDE 5 MG: 5 TABLET ORAL at 01:04

## 2021-08-18 RX ADMIN — ASPIRIN 325 MG: 325 TABLET, COATED ORAL at 09:33

## 2021-08-18 RX ADMIN — LISINOPRIL 20 MG: 20 TABLET ORAL at 09:33

## 2021-08-18 RX ADMIN — Medication 10 ML: at 09:39

## 2021-08-18 RX ADMIN — DOCUSATE SODIUM 50 MG AND SENNOSIDES 8.6 MG 1 TABLET: 8.6; 5 TABLET, FILM COATED ORAL at 09:34

## 2021-08-18 RX ADMIN — ACETAMINOPHEN 650 MG: 325 TABLET ORAL at 17:19

## 2021-08-18 RX ADMIN — DOCUSATE SODIUM 50 MG AND SENNOSIDES 8.6 MG 1 TABLET: 8.6; 5 TABLET, FILM COATED ORAL at 21:13

## 2021-08-18 RX ADMIN — ACETAMINOPHEN 650 MG: 325 TABLET ORAL at 01:00

## 2021-08-18 RX ADMIN — OXYBUTYNIN CHLORIDE 5 MG: 5 TABLET ORAL at 21:13

## 2021-08-18 RX ADMIN — ACETAMINOPHEN 650 MG: 325 TABLET ORAL at 06:00

## 2021-08-18 RX ADMIN — AMLODIPINE BESYLATE 5 MG: 5 TABLET ORAL at 09:34

## 2021-08-18 ASSESSMENT — PAIN DESCRIPTION - DESCRIPTORS: DESCRIPTORS: ACHING;DISCOMFORT;DULL

## 2021-08-18 ASSESSMENT — PAIN SCALES - GENERAL
PAINLEVEL_OUTOF10: 7
PAINLEVEL_OUTOF10: 3
PAINLEVEL_OUTOF10: 4
PAINLEVEL_OUTOF10: 0
PAINLEVEL_OUTOF10: 7
PAINLEVEL_OUTOF10: 3
PAINLEVEL_OUTOF10: 5

## 2021-08-18 ASSESSMENT — PAIN DESCRIPTION - ORIENTATION
ORIENTATION: RIGHT
ORIENTATION: RIGHT

## 2021-08-18 ASSESSMENT — PAIN DESCRIPTION - PAIN TYPE
TYPE: SURGICAL PAIN
TYPE: SURGICAL PAIN

## 2021-08-18 ASSESSMENT — PAIN - FUNCTIONAL ASSESSMENT: PAIN_FUNCTIONAL_ASSESSMENT: PREVENTS OR INTERFERES SOME ACTIVE ACTIVITIES AND ADLS

## 2021-08-18 ASSESSMENT — PAIN DESCRIPTION - LOCATION
LOCATION: HIP
LOCATION: HIP

## 2021-08-18 NOTE — PROGRESS NOTES
Physical Therapy  Physical Therapy Initial Assessment     Name: Kate Sheridan  : 1945  MRN: [id number]      Date of Service: 2021    Evaluating PT:  Magda Hannah, PT [id number]      Room #:  7498/8533-T  Diagnosis:  Osteoarthritis of right hip, unspecified osteoarthritis type [M16.11]  PMHx/PSHx:     has a past surgical history that includes Rotator cuff repair (Bilateral) and Total hip arthroplasty (Right, 2021). has a past surgical history that includes Rotator cuff repair (Bilateral) and Total hip arthroplasty (Right, 2021). Procedure/Surgery:  21 right total hip arthroplasty  Precautions:  Falls, anterior hip precautions , WBAT (weight bearing as tolerated) ,   Equipment Needs:  Wheeled Walker,    SUBJECTIVE:    Patient lives alone  in a ranch home  with 3 steps to enter with 1Rail  Bed is on 1 floor and bath is on 1 floor. Patient ambulated independently and with cane  PTA. Equipment owned: Virginie Ang,      **Patient states she really wishes to return to home. OBJECTIVE:   Initial Evaluation  Date: 21 Treatment Short Term/ Long Term   Goals   AM-PAC 6 Clicks      Was pt agreeable to Eval/treatment? yes     Does pt have pain? Yes      Bed Mobility  Rolling: NT  Supine to sit: Min  Sit to supine: Min  Scooting: Min  Rolling: Ind  Supine to sit: Ind  Sit to supine: Ind  Scooting: Ind   Transfers Sit to stand: Min to fww  Stand to sit: Min  Stand pivot: Min with fww  Sit to stand: Mod Ind  Stand to sit: Mod Ind  Stand pivot: Mod Ind   Ambulation    40 feet with fww Min  100+ feet with fww Mod Ind   Stair negotiation: ascended and descended  NT  3 steps with 1 rail Min   ROM BUE:  wfl   BLE:  Decrease RLE. Strength BUE: wfl   RLE:  4-/5  LLE:   4+/5  4/5   Balance Sitting EOB:  Ind  Dynamic Standing:  Min with fww. Sitting EOB:  Ind  Dynamic Standing:   Mod Ind     Patient is Alert & Oriented x person, place, time and situation and follows directions   Sensation:  Pt denies numbness and tingling to extremities  Edema:  RLE    Therapeutic Exercises:  Functional activity as stated above. Patient education  Pt educated on anterior hip precautions, mobility with fww. Patient response to education:   Pt verbalized understanding Pt demonstrated skill Pt requires further education in this area   yes yes Reminders     ASSESSMENT:    Conditions Requiring Skilled Therapeutic Intervention:    [x]Decreased strength     [x]Decreased ROM  [x]Decreased functional mobility  [x]Decreased balance   [x]Decreased endurance   [x]Decreased posture  []Decreased sensation  []Decreased coordination   []Decreased vision  [x]Decreased safety awareness   []Increased pain       Comments:    RN cleared patient for participation in therapy session. Patient was seen this date for PT evaluation. Patient was agreeable to intervention. Results of the functional assessment are noted above. Upon entering the room patient was found supine in bed. Assisted to EOB with cues for sequencing. . Sat EOB x 10 minutes to increase dynamic sitting balance and activity tolerance. Transfers and gait completed with fww. At end of session, patient in chair with call light and phone within reach,  all lines and tubes intact, nursing notified. This patient can benefit from the continuation of skilled PT  to maximize functional level and return to PLOF.        Treatment:  Patient practiced and was instructed in the following treatment:    · Bed mobility training - pt given verbal and tactile cues to facilitate proper sequencing and safety during rolling and supine>sit as well as provided with physical assistance to complete task    · Assistive device training - pt educated on using [employer] during gait, [employer] approximation/negotiation, and hand placement during sit<>stand to [employer]  · STS and transfer training - educated on hand/foot placement, safety, and sequencing during STS and pivot transfers using assistive device  · Gait training - verbal cues for [employer] approximation/negotiation, upright posture, and safety during 90 and 180 degree turns during gait   · Education in VARKA[employer] and anterior hip precautions. Pt's/ family goals   1. Home when ready. Prognosis is  good for reaching above PT goals. Patient and or family understand(s) diagnosis, prognosis, and plan of care. yes    PHYSICAL THERAPY PLAN OF CARE:    PT POC is established based on physician order and patient diagnosis     Referring provider/PT Order:    08/17/21 1215  PT eval and treat       Skyler Blackburn DO       Diagnosis:  Osteoarthritis of right hip, unspecified osteoarthritis type [M16.11]  Specific instructions for next treatment:  Increase ambulation distance, Stair negotiation and BLE therapeutic exercise  Current Treatment Recommendations:     [x] Strengthening to improve independence with functional mobility   [x] ROM to improve independence with functional mobility   [x] Balance Training to improve static/dynamic balance and to reduce fall risk  [x] Endurance Training to improve activity tolerance during functional mobility   [x] Transfer Training to improve safety and independence with all functional transfers   [x] Gait Training to improve gait mechanics, endurance and asses need for appropriate assistive device  [x] Stair Training in preparation for safe discharge home and/or into the community   [] Positioning to prevent skin breakdown and contractures  [x] Safety and Education Training   [] Patient/Caregiver Education   [] HEP  [] Other     PT long term treatment goals are located in above grid    Frequency of treatments: 2-5x/week x 1-2 weeks.     Time in  0750  Time out  0815    Total Treatment Time  10 minutes     Evaluation Time includes thorough review of current medical information, gathering information on past medical history/social history and prior level of function, completion of standardized testing/informal observation of tasks, assessment of data and education on plan of care and goals.     CPT codes:  [x] Low Complexity PT evaluation 21395  [] Moderate Complexity PT evaluation 65722  [] High Complexity PT evaluation 63542  [] PT Re-evaluation 81326  [] Gait training 71939 - minutes  [] Manual therapy 33904 - minutes  [x] Therapeutic activities 56690 10 minutes  [] Therapeutic exercises 73763 - minutes  [] Neuromuscular reeducation 98504 - minutes     Cisco Gutierrez, [address] [facility]

## 2021-08-18 NOTE — PLAN OF CARE
Problem: Pain:  Goal: Pain level will decrease  Description: Pain level will decrease  Outcome: Met This Shift     Problem: Infection - Surgical Site:  Goal: Signs of wound healing will improve  Description: Signs of wound healing will improve  Outcome: Met This Shift     Problem: Pain - Acute:  Goal: Pain level will decrease  Description: Pain level will decrease  Outcome: Met This Shift

## 2021-08-18 NOTE — PROGRESS NOTES
Physical Therapy  Physical Therapy Initial Assessment/Rx    Name: Aminah Rubalcava  : 1945  MRN: [id number]      Date of Service: 2021    Evaluating PT:  Hang Nice, PT [id number]      Room #:  3018/7532-T  Diagnosis:  Osteoarthritis of right hip, unspecified osteoarthritis type [M16.11]  PMHx/PSHx:     has a past surgical history that includes Rotator cuff repair (Bilateral) and Total hip arthroplasty (Right, 2021). has a past surgical history that includes Rotator cuff repair (Bilateral) and Total hip arthroplasty (Right, 2021). Procedure/Surgery:  21 right total hip arthroplasty  Precautions:  Falls, anterior hip precautions , WBAT (weight bearing as tolerated) ,   Equipment Needs:  Wheeled Walker,    SUBJECTIVE:    Patient lives alone  in a ranch home  with 3 steps to enter with 1Rail  Bed is on 1 floor and bath is on 1 floor. Patient ambulated independently and with cane  PTA. Equipment owned: Woodrow Velez,      **Patient states she really wishes to return to home. OBJECTIVE:   Initial Evaluation  Date: 21 Treatment  21 PM Short Term/ Long Term   Goals   AM-PAC 6 Clicks  60/66    Was pt agreeable to Eval/treatment? yes yes    Does pt have pain? Yes  Minimal    Bed Mobility  Rolling: NT  Supine to sit: Min  Sit to supine: NT  Scooting: Min Rolling: NT  Supine to sit: NT  Sit to supine: Min  Scooting: Min Rolling: Ind  Supine to sit: Ind  Sit to supine: Ind  Scooting: Ind   Transfers Sit to stand: Min to fww  Stand to sit: Min  Stand pivot: Min with fww Sit to stand: Min to fww  Stand to sit: SBA  Stand pivot: Min with fww  Verbally reviewed car transfer sequencing. Already completing. Sit to stand: Mod Ind  Stand to sit: Mod Ind  Stand pivot:  Mod Ind   Ambulation    40 feet with fww Min 55 feet with fww + feet with fww Mod Ind   Stair negotiation: ascended and descended  NT Discussed sequencing and goal is to complete am session  3 steps with 1 rail Min   ROM BUE:  wfl   BLE:  Decreased RLE. AROm completed for BLE prior to gait and while supine. Strength BUE: wfl   RLE:  4-/5  LLE:   4+/5  4/5   Balance Sitting EOB:  Ind  Dynamic Standing:  Min with fww. Sitting EOB:  Ind  Dynamic Standing: Mod Ind     Patient is Alert & Oriented x person, place, time and situation and follows directions   Sensation:  Pt denies numbness and tingling to extremities  Edema:  RLE    Therapeutic Exercises:  Functional activity as stated above. Patient education  Pt educated on anterior hip precautions, mobility with fww. Reviewed hip precautions, car transfer and step negotiation. Patient response to education:   Pt verbalized understanding Pt demonstrated skill Pt requires further education in this area   yes yes Reminders     ASSESSMENT:    Conditions Requiring Skilled Therapeutic Intervention:    [x]Decreased strength     [x]Decreased ROM  [x]Decreased functional mobility  [x]Decreased balance   [x]Decreased endurance   [x]Decreased posture  []Decreased sensation  []Decreased coordination   []Decreased vision  [x]Decreased safety awareness   []Increased pain       Comments:    RN cleared patient for participation in therapy session. Patient was seen this date for PT evaluation. Patient was agreeable to intervention. Results of the functional assessment are noted above. Upon entering the room patient was found supine in bed. Assisted to EOB with cues for sequencing. . Sat EOB x 10 minutes to increase dynamic sitting balance and activity tolerance. Transfers and gait completed with fww. At end of session, patient in chair with call light and phone within reach,  all lines and tubes intact, nursing notified. PM session patient in bedside chair upon arrival to room. BLE AROM completed prior to gait. Gait completed into hallway with fww for support. Minimal cues required. Discussed sequencing for car transfer and step negotiation. Patient in bed sequentials on,. appropriate assistive device  [x] Stair Training in preparation for safe discharge home and/or into the community   [] Positioning to prevent skin breakdown and contractures  [x] Safety and Education Training   [] Patient/Caregiver Education   [] HEP  [] Other     PT long term treatment goals are located in above grid    Frequency of treatments: 2-5x/week x 1-2 weeks. Time in  0750  Time out  0815  Time in 1410  Time out 1430    Total Treatment Time  40 minutes     Evaluation Time includes thorough review of current medical information, gathering information on past medical history/social history and prior level of function, completion of standardized testing/informal observation of tasks, assessment of data and education on plan of care and goals.     CPT codes:  [x] Low Complexity PT evaluation 26174  [] Moderate Complexity PT evaluation 20042  [] High Complexity PT evaluation 65242  [] PT Re-evaluation 03885  [] Gait training 91854 - minutes  [] Manual therapy 63261 - minutes  [x] Therapeutic activities 78507 40 minutes  [] Therapeutic exercises 69913 - minutes  [] Neuromuscular reeducation 78131 - minutes     April Gaines, [address] [facility]

## 2021-08-18 NOTE — PROGRESS NOTES
[address] [facility] Jeremiah Schroederques [address]  [address] [facility], [address] [place] [address], [address] [place] [address]      Date:2021                 Patient Name: Kika Hamm  MRN: [id number]  : 1945  Room: [address] [place] [address]    Referring [address], DO  Specific Provider Orders/Date: OT evaluation and treat 21    Evaluating OT: Tonya Obando, OTR/L #[id number]    Diagnosis:  OA R hip     Surgery: s/p R TIP 21    Pertinent Medical History: HTN    Precautions:  Fall Risk, WBAT to RLE , anterolateral hip precautions    Assessment of current deficits   [x] Functional mobility  [x]ADLs  [] Strength               []Cognition   [x] Functional transfers   [x] IADLs         [x] Safety Awareness   [x]Endurance   [] Fine Coordination              [x] Balance      [] Vision/perception   []Sensation    []Gross Motor Coordination  [] ROM  [] Delirium                   [] Motor Control     OT PLAN OF CARE   OT POC based on physician orders, patient diagnosis and results of clinical assessment    Frequency/Duration   2-4 days/wk for 1 week PRN   Specific OT Treatment Interventions to include:   * Instruction/training on adapted ADL techniques and AE recommendations to increase functional independence within precautions       * Training on energy conservation strategies, correct breathing pattern and techniques to improve independence/tolerance for self-care routine  * Functional transfer/mobility training/DME recommendations for increased independence, safety, and fall prevention  * Patient/Family education to increase follow through with safety techniques and functional independence  * Recommendation of environmental modifications for increased safety with functional transfers/mobility and ADLs  * Therapeutic activities to facilitate/challenge dynamic balance, stand tolerance for increased safety and independence with ADLs  * Therapeutic activities to facilitate gross/fine motor skills for increased independence with ADLs  Other: anterolateral hip precautions      Recommended Adaptive Equipment: ww, shower seat, AE for LE dressing and bathing , HR by commode    Home Living: Pt lives alone in a one story home with 3 steps and one hand rails. Bed and bath on main floor with laundry in basement with full flight of  steps and one HR's.   Bathroom setup: tub shower with HR   Equipment owned: spc    Prior Level of Function: Independent with ADLs , Independent with IADLs; ambulated spc  Driving: yes  Occupation: retired  Medication management: self  Leisure: enjoys games on [employer]    Pain Level: 3/10 R hip 5/10 after mobility    Cognition: A&O: 4/4; Follows multi step directions   Memory:  Fair + reminders for hip precautions   Sequencing:  Good-   Problem solving:  Good-   Judgement/safety:  Good-     Functional Assessment:  AM-PAC Daily Activity Raw Score: 16/24   Initial Eval Status  Date: 8/18/21 Treatment Status  Date: STGs = LTGs  Time frame: 2-4 days   Feeding Independent     Grooming Setup sitting   Mod I standing   UB Dressing SBA sitting EOB  Mod I    LB Dressing Max A   Mod I with AE prn   Bathing Simulated mod A   SBA seated with AE prn   Toileting Mod A   Mod I    Bed Mobility  Supine to sit: min A   Sit to supine:  n/t  Supine to sit: mod I   Sit to supine: mod i   Functional Transfers Min A with ww sit to stand  Mod I with ww   Functional Mobility Min A in room with ww   Mod I with ww   Balance Sitting:     Static:  SBA    Dynamic:SBA  Standing: min A                                                                        Activity Tolerance Good- O2 96% RA  good   Visual/  Perceptual Glasses: yes WFL         Safety Good-                                  Good follow through of anterolateral hip precautions     Hand Dominance R    AROM (PROM) Strength Additional Info:    RUE  WFL 4+/5 good  and wfl FMC/dexterity noted during ADL tasks       LUE WFL 4+/5 good  and wfl FMC/dexterity noted during ADL tasks     Hearing: WFL slight Douglas  Sensation:   No c/o numbness or tingling   Tone: WFL   Edema: non noted    Comments: Upon arrival patient supine and agreeable to evaluation. Performed OT evaluation with education on hip precautions and safety with ADL completion, bathroom and walker safety. At end of session, patient sitting up in chair and eating breakfast and  with call light and phone within reach, all lines and tubes intact. Nursing notified. Overall patient demonstrated  decreased independence and safety during completion of ADL/functional transfer/mobility tasks. Pt would benefit from continued skilled OT to increase safety and independence with completion of ADL/IADL tasks for functional independence and quality of life. Treatment: OT treatment provided this date includes:    Instruction/training on safety and adapted techniques for completion of ADLs: with AE/DME prn     Instruction/training on safe functional mobility/transfer techniques: ww    Instruction/training on energy conservation/work simplification for completion of ADLs: . Encouraged deep breathing for edema and pain control.   Neuromuscular Reeducation to facilitate balance/righting reactions for increased function with ADLs tasks:     Proper Positioning/Alignment anterolateral hip precautions    Rehab Potential: Good  for established goals     Patient / Family Goal: decrease pain - home with assist      Patient and/or family were instructed on functional diagnosis, prognosis/goals and OT plan of care. Demonstrated good understanding. Eval Complexity: low    Time In: 7:47  Time Out: 8:17  Total Treatment Time: 15 min.       Min Units   OT Eval Low 04516  X     OT Eval Medium 36331      OT Eval High [id number]       OT Re-Eval [id number]       Therapeutic Ex [id number]       Therapeutic Activities 84936  15  1   ADL/Self Care 85270       Orthotic Management 26113       Neuro Re-Ed 57804       Non-Billable Time          Evaluation Time includes thorough review of current medical information, gathering information on past medical history/social history and prior level of function, completion of standardized testing/informal observation of tasks, assessment of data and education on plan of care and goals. Jacey Brizuela.  Lucia 71, Marleny 70

## 2021-08-18 NOTE — PROGRESS NOTES
Department of Orthopedic Surgery  Resident Progress Note    Patient seen and examined at bedside on postoperative day 1. She states that her pain is well controlled, has only needed some Tylenol occasionally. Patient did not get up to her room until after 6 PM last night, states that she was never seen by physical therapy and has not gotten up and walk yet. She did sit up and have her legs dangling over the side of her bed this morning. Denies any chest pain, shortness of breath, numbness, tingling, weakness. Has not had a bowel movement yet.     VITALS:  /60   Pulse 72   Temp 97.5 °F (36.4 °C) (Temporal)   Resp 18   Ht 5' 2\" (1.575 m)   Wt 157 lb (71.2 kg)   SpO2 98%   BMI 28.72 kg/m²     General: alert and oriented to person, place and time, well-developed and well-nourished, in no acute distress    MUSCULOSKELETAL:   right lower extremity:  · Dressing C/D/I, very small amount of blood centrally  · Compartments soft and compressible  · +PF/DF/EHL  · Mild pain with log roll  · +2/4 DP & PT pulses, Brisk Cap refill, Toes warm and perfused  · Distal sensation grossly intact to Peroneals, Sural, Saphenous, and tibial nrs    CBC:   Lab Results   Component Value Date    WBC 6.5 08/10/2021    HGB 12.7 08/10/2021    HCT 40.6 08/10/2021     08/10/2021     PT/INR:  No results found for: PROTIME, INR      ASSESSMENT  · S/P R TIP - POD#1    PLAN      · Start physical therapy and protocol: WBAT - RLE  · 24 hour abx coverage  · Deep venous thrombosis prophylaxis - mg twice daily, early mobilization  · PT/OT  · Pain Control: IV and PO  · Monitor H&H  · D/C Plan: Awaiting evaluation by PT/OT  · Will discuss with attending    Bri Calvillo DO

## 2021-08-18 NOTE — CARE COORDINATION
Met with pt at bedside today. Pt lives alone in a 3 step to enter, ranch style home. Flight of steps to basement to laundry and rec room. Pt owns a cane, no hh or elton hx. Pta pt was independent of iadls, driving and would ambulate with cane. Dr. Rosemarie Lopes is pcp. accudose for meds. Pt will need home health orders for PT. Pt will need dme orders for wheeled walker. Pt chose [facility], referral made and [facility] to follow. Family to transport home. 11:00PM notified Rell isbell of wheeled walker, need orders. Selvin Nip, [address]     The Plan for Transition of Care is related to the following treatment goals: discharge planning when medically stable    The Patient and/or patient representative [facility] was provided with a choice of provider and agrees   with the discharge plan. [x] Yes [] No    Freedom of choice list was provided with basic dialogue that supports the patient's individualized plan of care/goals, treatment preferences and shares the quality data associated with the providers.  [x] Yes [] No

## 2021-08-18 NOTE — PROGRESS NOTES
Postop day 1 right total hip arthroplasty. Pt seen and examined by me. Findings and plan as documented per Orthopaedic Resident Surgeon note. Postop x-rays right total hip satisfactory. Patient is feeling well less pain than preop. Plan mobilize with PT. Home versus rehab depending on progress next 48 hours.

## 2021-08-19 LAB
HCT VFR BLD CALC: 36.2 % (ref 34–48)
HEMOGLOBIN: 11.4 G/DL (ref 11.5–15.5)
MCH RBC QN AUTO: 28.4 PG (ref 26–35)
MCHC RBC AUTO-ENTMCNC: 31.5 % (ref 32–34.5)
MCV RBC AUTO: 90 FL (ref 80–99.9)
PDW BLD-RTO: 12.2 FL (ref 11.5–15)
PLATELET # BLD: 244 E9/L (ref 130–450)
PMV BLD AUTO: 9.7 FL (ref 7–12)
RBC # BLD: 4.02 E12/L (ref 3.5–5.5)
WBC # BLD: 9.2 E9/L (ref 4.5–11.5)

## 2021-08-19 PROCEDURE — 87077 CULTURE AEROBIC IDENTIFY: CPT

## 2021-08-19 PROCEDURE — 87186 SC STD MICRODIL/AGAR DIL: CPT

## 2021-08-19 PROCEDURE — 97530 THERAPEUTIC ACTIVITIES: CPT

## 2021-08-19 PROCEDURE — G0378 HOSPITAL OBSERVATION PER HR: HCPCS

## 2021-08-19 PROCEDURE — 1200000000 HC SEMI PRIVATE

## 2021-08-19 PROCEDURE — 6370000000 HC RX 637 (ALT 250 FOR IP): Performed by: STUDENT IN AN ORGANIZED HEALTH CARE EDUCATION/TRAINING PROGRAM

## 2021-08-19 PROCEDURE — 85027 COMPLETE CBC AUTOMATED: CPT

## 2021-08-19 PROCEDURE — 99024 POSTOP FOLLOW-UP VISIT: CPT | Performed by: ORTHOPAEDIC SURGERY

## 2021-08-19 PROCEDURE — 36415 COLL VENOUS BLD VENIPUNCTURE: CPT

## 2021-08-19 PROCEDURE — 87088 URINE BACTERIA CULTURE: CPT

## 2021-08-19 PROCEDURE — 2580000003 HC RX 258: Performed by: STUDENT IN AN ORGANIZED HEALTH CARE EDUCATION/TRAINING PROGRAM

## 2021-08-19 PROCEDURE — 97535 SELF CARE MNGMENT TRAINING: CPT

## 2021-08-19 RX ADMIN — ASPIRIN 325 MG: 325 TABLET, COATED ORAL at 21:29

## 2021-08-19 RX ADMIN — ACETAMINOPHEN 650 MG: 325 TABLET ORAL at 00:49

## 2021-08-19 RX ADMIN — OXYCODONE HYDROCHLORIDE 10 MG: 10 TABLET ORAL at 12:26

## 2021-08-19 RX ADMIN — OXYBUTYNIN CHLORIDE 5 MG: 5 TABLET ORAL at 21:29

## 2021-08-19 RX ADMIN — ACETAMINOPHEN 650 MG: 325 TABLET ORAL at 06:25

## 2021-08-19 RX ADMIN — ACETAMINOPHEN 650 MG: 325 TABLET ORAL at 18:22

## 2021-08-19 RX ADMIN — Medication 10 ML: at 10:06

## 2021-08-19 RX ADMIN — DOCUSATE SODIUM 50 MG AND SENNOSIDES 8.6 MG 1 TABLET: 8.6; 5 TABLET, FILM COATED ORAL at 21:29

## 2021-08-19 RX ADMIN — LISINOPRIL 20 MG: 20 TABLET ORAL at 10:06

## 2021-08-19 RX ADMIN — Medication 10 ML: at 21:29

## 2021-08-19 RX ADMIN — ASPIRIN 325 MG: 325 TABLET, COATED ORAL at 10:06

## 2021-08-19 RX ADMIN — DOCUSATE SODIUM 50 MG AND SENNOSIDES 8.6 MG 1 TABLET: 8.6; 5 TABLET, FILM COATED ORAL at 10:05

## 2021-08-19 RX ADMIN — AMLODIPINE BESYLATE 5 MG: 5 TABLET ORAL at 10:06

## 2021-08-19 ASSESSMENT — PAIN SCALES - GENERAL
PAINLEVEL_OUTOF10: 4
PAINLEVEL_OUTOF10: 5
PAINLEVEL_OUTOF10: 3
PAINLEVEL_OUTOF10: 0
PAINLEVEL_OUTOF10: 7

## 2021-08-19 NOTE — PROGRESS NOTES
Occupational Therapy  OT BEDSIDE TREATMENT NOTE    [facility] [address] [address]      Date:2021  Patient Name: Marah Johnson  MRN: [id number]  : 1945  Room: [address] [place] [address]     Referring [address],   Specific Provider Orders/Date: OT evaluation and treat 21     Evaluating OT: Yumiko Martinez. Top[place], OTR/L #[id number]     Diagnosis:  OA R hip      Surgery: s/p R TIP 21     Pertinent Medical History: HTN     Precautions:  Fall Risk, WBAT to RLE , anterolateral hip precautions     Assessment of current deficits   [x]? Functional mobility             [x]?ADLs           []? Strength                  []?Cognition   [x]? Functional transfers           [x]? IADLs         [x]? Safety Awareness   [x]? Endurance   []? Fine Coordination              [x]? Balance      []? Vision/perception   []? Sensation     []? Gross Motor Coordination  []? ROM           []?  Delirium                   []? Motor Control      OT PLAN OF CARE   OT POC based on physician orders, patient diagnosis and results of clinical assessment     Frequency/Duration   2-4 days/wk for 1 week PRN   Specific OT Treatment Interventions to include:   * Instruction/training on adapted ADL techniques and AE recommendations to increase functional independence within precautions       * Training on energy conservation strategies, correct breathing pattern and techniques to improve independence/tolerance for self-care routine  * Functional transfer/mobility training/DME recommendations for increased independence, safety, and fall prevention  * Patient/Family education to increase follow through with safety techniques and functional independence  * Recommendation of environmental modifications for increased safety with functional transfers/mobility and ADLs  * Therapeutic activities to facilitate/challenge dynamic balance, stand tolerance for increased safety and independence with ADLs  * Therapeutic activities to facilitate gross/fine motor skills for increased independence with ADLs  Other: anterolateral hip precautions        Recommended Adaptive Equipment: ww, shower seat, AE for LE dressing and bathing(issued 8/19), HR by commode     Home Living: Pt lives alone in a one story home with 3 steps and one hand rails. Bed and bath on main floor with laundry in basement with full flight of  steps and one HR's. Bathroom setup: tub shower with HR   Equipment owned: spc     Prior Level of Function: Independent with ADLs , Independent with IADLs; ambulated spc  Driving: yes  Occupation: retired  Medication management: self  Leisure: enjoys games on computer     Pain Level: Pt reports mild hip pain  Cognition: A&O: 4/4; Follows multi step directions              Memory:  Fair +               Sequencing:  Good-              Problem solving:  Good-              Judgement/safety:  Good-                Functional Assessment:  AM-PAC Daily Activity Raw Score: 19/24    Initial Eval Status  Date: 8/18/21 Treatment Status  Date: 8/19/21 STGs = LTGs  Time frame: 2-4 days   Feeding Independent  Ind     Grooming Setup sitting  SBA  To wash hands standing at sink  Mod I standing   UB Dressing SBA sitting EOB SBA  seated  Mod I    LB Dressing Max A  Min A  Pt educated on LB AE, donned/doffed socks using reacher and sock aid  Mod I with AE prn   Bathing Simulated mod A  Min A  Simulated seated   SBA seated with AE prn   Toileting Mod A   SBA- toilet transfer  (bedside commode and w/w)  Ind- hygiene Mod I    Bed Mobility  Supine to sit: min A   Sit to supine:  n/t  SBA- supine>sit  Educated pt on technique to increase independence.    Supine to sit: mod I   Sit to supine: mod i   Functional Transfers Min A with ww sit to stand  SBA- sit<->stand  Cuing for hand placement and body mechanics Mod I with ww   Functional Mobility Min A in room with ww  SBA  To and from bathroom using w/w  Mod I with ww   Balance Sitting:     Static:  SBA    Dynamic:SBA  Standing: min A  Sitting:     Static:  Ind    Dynamic: Ind  Standing: SBA                                                                       Activity Tolerance Good- O2 96% RA  Good- good   Visual/  Perceptual Glasses: yes WFL           Safety Good-  good-                                 Good follow through of anterolateral hip precautions       Comments: Upon arrival pt supine in bed. Educated pt on anterior hip precautions, demonstrates fair+ understanding. Pt educated on techniques to increase independence and safety during ADL's, bed mobility, and functional transfers. Discussed home set up with pt, giving suggestions to increase safety at discharge. At end of session pt left seated in bedside chair, call light within reach. · Pt has made fair+ progress towards set goals.      · Continue with current plan of care    Treatment Time In: 1:10            Treatment Time Out: 1:34             Treatment Charges: Mins Units   Ther Ex  [id number]     Manual Therapy [address] [facility]     Thera Activities 32373 10 1   ADL/Home Mgt 39387 14 1   Neuro Re-ed 29940     Group Therapy      Orthotic manage/training  24032     Non-Billable Time     Total Timed Treatment 24 2     Jorge Kraft

## 2021-08-19 NOTE — PLAN OF CARE
Problem: Falls - Risk of:  Goal: Will remain free from falls  Description: Will remain free from falls  8/19/2021 1042 by Geovanna Rangel RN  Outcome: Met This Shift  8/18/2021 2322 by Krystle Stevens RN  Outcome: Met This Shift     Problem: Falls - Risk of:  Goal: Absence of physical injury  Description: Absence of physical injury  8/19/2021 1042 by Geovanna Rangel RN  Outcome: Met This Shift  8/18/2021 2322 by Krystle Stevesn RN  Outcome: Met This Shift     Problem: Pain:  Goal: Pain level will decrease  Description: Pain level will decrease  8/19/2021 1042 by Geovanna Rangel RN  Outcome: Met This Shift  8/18/2021 2322 by Krystle Stevens RN  Outcome: Ongoing     Problem: Pain:  Goal: Control of acute pain  Description: Control of acute pain  8/19/2021 1042 by Geovanna Rangel RN  Outcome: Met This Shift  8/18/2021 2322 by Krystle Stevens RN  Outcome: Ongoing     Problem: Pain:  Goal: Control of chronic pain  Description: Control of chronic pain  8/19/2021 1042 by Geovanna Rangel RN  Outcome: Met This Shift  8/18/2021 2322 by Krystle Stevens RN  Outcome: Ongoing     Problem: Pain - Acute:  Goal: Pain level will decrease  Description: Pain level will decrease  8/19/2021 1042 by Geovanna Rangel RN  Outcome: Met This Shift  8/18/2021 2322 by Krystle Stevens RN  Outcome: Ongoing

## 2021-08-19 NOTE — PLAN OF CARE
Problem: Falls - Risk of:  Goal: Will remain free from falls  Description: Will remain free from falls  8/18/2021 2322 by Asa Mir RN  Outcome: Met This Shift  8/18/2021 1146 by Sophy Dykes RN  Outcome: Met This Shift  Goal: Absence of physical injury  Description: Absence of physical injury  8/18/2021 2322 by Asa Mir RN  Outcome: Met This Shift  8/18/2021 1146 by Sophy Dykes RN  Outcome: Met This Shift     Problem: Pain:  Goal: Pain level will decrease  Description: Pain level will decrease  8/18/2021 2322 by Asa Mir RN  Outcome: Ongoing  8/18/2021 1146 by Sophy Dykes RN  Outcome: Met This Shift  Goal: Control of acute pain  Description: Control of acute pain  Outcome: Ongoing  Goal: Control of chronic pain  Description: Control of chronic pain  Outcome: Ongoing     Problem: Discharge Planning:  Goal: Knowledge of discharge instructions  Description: Knowledge of discharge instructions  Outcome: Ongoing     Problem: Infection - Surgical Site:  Goal: Signs of wound healing will improve  Description: Signs of wound healing will improve  Outcome: Met This Shift     Problem: Mobility - Impaired:  Goal: Achieve maximum mobility level  Description: Achieve maximum mobility level  Outcome: Ongoing     Problem: Pain - Acute:  Goal: Pain level will decrease  Description: Pain level will decrease  8/18/2021 2322 by Asa Mir RN  Outcome: Ongoing  8/18/2021 1146 by Sophy Dykes RN  Outcome: Met This Shift

## 2021-08-19 NOTE — PROGRESS NOTES
Patient seen and examined at bedside this afternoon. She states that she does still have some urinary frequency, denies any burning, foul-smelling urine, or color changes to her urine. Hip exam unchanged. Pain is well controlled. She denies any chest pain, shortness of breath, fever, chills, abdominal pain. Did have a bowel movement. she worked with physical therapy again today and again did very well, will go home at discharge however she does not feel like she wants to go home tonight because she is unsure when she can get a ride. Will discharge tomorrow, urine cultures sent and pending, will follow up.

## 2021-08-19 NOTE — PROGRESS NOTES
Department of Orthopedic Surgery  Resident Progress Note    Patient seen and examined at bedside on postoperative day 1. Pain is well controlled today, she denies any numbness, tingling, weakness. She did work with physical therapy yesterday, did very well and did not have any significant discomfort. Has not had a bowel movement yet but currently feels like she is going to have one. Denies any other complaints today, denies chest pain, shortness of breath, fever, chills.     VITALS:  BP (!) 143/63   Pulse 68   Temp 98.9 °F (37.2 °C) (Temporal)   Resp 16   Ht 5' 2\" (1.575 m)   Wt 157 lb (71.2 kg)   SpO2 97%   BMI 28.72 kg/m²     General: alert and oriented to person, place and time, well-developed and well-nourished, in no acute distress    MUSCULOSKELETAL:   right lower extremity:  · Dressing C/D/I, very small amount of blood centrally  · Compartments soft and compressible  · +PF/DF/EHL  · Mild pain with log roll  · +2/4 DP & PT pulses, Brisk Cap refill, Toes warm and perfused  · Distal sensation grossly intact to Peroneals, Sural, Saphenous, and tibial nrs    CBC:   Lab Results   Component Value Date    WBC 6.5 08/10/2021    HGB 12.7 08/10/2021    HCT 40.6 08/10/2021     08/10/2021     PT/INR:  No results found for: PROTIME, INR      ASSESSMENT  S/P R TIP - POD#2  PLAN      · Continue physical therapy and protocol: WBAT - RLE  · 24 hour abx coverage-completed  · Deep venous thrombosis prophylaxis - mg twice daily, early mobilization  · PT/OT  · Pain Control: IV and PO  · Monitor H&H  · D/C Plan: Likely DC home today or if not tomorrow  · Will discuss with attending    Yeny Douglass,

## 2021-08-19 NOTE — PROGRESS NOTES
Postop day 2 right total hip arthroplasty. Pt seen and examined by me. Findings and plan as documented per Orthopaedic Resident Surgeon note. Patient's primary concern is urinary frequency. This was reported to be somewhat of an issue by the patient preop although she states she is never had urologic evaluation. States it was always manageable. She had urinary catheter overnight which was removed yesterday. Issues with wake external catheter overnight. Surgical site and hip exam all satisfactory. Did well in PT yesterday. Plan:  Check urine culture. Discussed with nursing staff using adult diaper and assisting to bathroom when needed. Urinary frequency may be due to mobilizing her perioperative fluid overload. Urology consult if not improved.

## 2021-08-19 NOTE — CARE COORDINATION
8/19-Update CM Note: CM/SW met with patient while she was sitting up in the chair. Patient confirmed that she wants [facility] and needs a Bedside Commode and Walker for home. [facility] consult confirmed. Patito Lu with [facility] updated with patient's needs. I asked the patient if she got d/c home tonight if she had transportation. Patient advise CM that she thought she was being d/c home tomorrow. But if she got d/c home tonight she will call her daughter to see when she could get here. Otherwise she said her family can pick her up tomorrow. Pending UC results.

## 2021-08-19 NOTE — PROGRESS NOTES
Physical Therapy  Treatment Note    Name: Sharda Ovalle  : 1945  MRN: [id number]      Date of Service: 2021    Evaluating PT:  Bianca Trammell, PT [id number]      Room #:  4277/3717-J  Diagnosis:  Osteoarthritis of right hip, unspecified osteoarthritis type [M16.11]  PMHx/PSHx:     has a past surgical history that includes Rotator cuff repair (Bilateral) and Total hip arthroplasty (Right, 2021). has a past surgical history that includes Rotator cuff repair (Bilateral) and Total hip arthroplasty (Right, 2021). Procedure/Surgery:  21 right total hip arthroplasty  Precautions:  Falls, anterior hip precautions , WBAT (weight bearing as tolerated) ,   Equipment Needs:  Wheeled Walker,    SUBJECTIVE:    Patient lives alone  in a ranch home  with 3 steps to enter with 1Rail  Bed is on 1 floor and bath is on 1 floor. Patient ambulated independently and with cane  PTA. Equipment owned: [address] [facility] [place],      **Patient states she really wishes to return to home. OBJECTIVE:   Initial Evaluation  Date: 21 Treatment  21 AM Treatment  21 PM Short Term/ Long Term   Goals   AM-PAC 6 Clicks  77/45 52/32    Was pt agreeable to Eval/treatment? yes Yes Yes     Does pt have pain? Yes  7/10 R hip 7/10 R hip pain    Bed Mobility  Rolling: NT  Supine to sit: Min  Sit to supine: NT  Scooting: Min Rolling: NT  Supine to sit: NT  Sit to supine: NT  Scooting: NT NT  Rolling: Ind  Supine to sit: Ind  Sit to supine: Ind  Scooting: Ind   Transfers Sit to stand: Min to fww  Stand to sit: Min  Stand pivot: Min with fww Sit to stand: Min A   Stand to sit: SBA  Stand pivot: Min A with fww  Verbally reviewed car transfer sequencing. Already completing. Sit to stand: Supervision  Stand to sit: Supervision  Stand pivot: SBA using WW  Sit to stand: Mod Ind  Stand to sit: Mod Ind  Stand pivot:  Mod Ind   Ambulation    40 feet with fww Min 150 feet with fww  feet using WW with SBA<>supervision 100+ feet with fww Mod Ind   Stair negotiation: ascended and descended  NT Discussed sequencing and pt declined due to pain 3 steps using 1 HR with Min A  3 steps with 1 rail Min   ROM BUE:  wfl   BLE:  Decreased RLE. Decreased RLE ROM due to pain     Strength BUE: wfl   RLE:  4-/5  LLE:   4+/5   4/5   Balance Sitting EOB:  Ind  Dynamic Standing:  Min with fww. Sitting EOB:  Ind  Dynamic Standing:  Min with fww. Sitting EOB:  Ind  Dynamic Standing: SBA using [employer] Sitting EOB:  Ind  Dynamic Standing: Mod Ind     Patient is Alert & Oriented x person, place, time and situation and follows directions   Sensation:  Pt denies numbness and tingling to extremities  Edema:  RLE    Therapeutic Exercises:     STS x3 reps   Commode transfer 2 reps    BLE ROM     Patient education  Pt educated on anterior hip precautions, mobility with fww. Reviewed hip precautions, car transfer and step negotiation. Patient response to education:   Pt verbalized understanding Pt demonstrated skill Pt requires further education in this area   yes yes Reminders     ASSESSMENT:    Comments:    Pt received sitting on commode. Pt required assist for transfer to standing and for balance during hygiene. Pt reported having increased pain this date and feeling stiff. Pt amb with slow gait speed and FFP. Pt cued for upright posture, heel strike and safe WW use. Pt was able to increase distance amb, but declined steps this session due to pain. Pt was left sitting up in chair with call button within reach. In PM, pt required assist on/off commode to void again. Pt with improved ability to perform this PM.  Pt amb with improved gait speed and required less cues for sequencing and [employer] safety. Pt educated on performing steps with use of 1 HR and cane vs lateral approach. Pt required assist for balance and lift when ascending. Vcs for B foot placement.   Pt returned to room and was left sitting up in chair with call button within reach and daughter at bedside. Treatment:  Patient practiced and was instructed in the following treatment:    · Assistive device training - pt educated on using [employer] during gait, [employer] approximation/negotiation, and hand placement during sit<>stand to [employer]  · STS and pivot transfer training - pt educated on proper hand and foot placement, safety and sequencing, and use of WW to safely complete sit<>stand and pivot transfers with hands on assistance and VCs to complete task safely. · Gait training- pt was given verbal and tactile cues to facilitate increased step height and body positioning inside walker during ambulation as well as provided with physical assistance to complete task. ·  Reviewed stairs and car transfer. · Stair training- pt required cues and physical assist to complete safely. Pt instructed in lateral approach due to increased BUE with mobility. · Patient education provided continuously throughout session with focus on correcting deviations or safety concerns observed throughout session. PHYSICAL THERAPY PLAN OF CARE:  Patient is making good progress towards goals. Will continue with current POC.      AM  Time in  1315         Time out  1345    PM  Time in 1550         Time out 1615    Total Treatment Time  30 + 25 minutes     CPT codes:  [] PT Re-evaluation 05992  [] Gait training 08434 - minutes  [] Manual therapy 30064 - minutes  [x] Therapeutic activities 56214 55 minutes  [] Therapeutic exercises 06576 - minutes  [] Neuromuscular reeducation 96790 - minutes     Gregory Valladares PT, DPT  License [id number]

## 2021-08-20 VITALS
BODY MASS INDEX: 28.89 KG/M2 | HEART RATE: 83 BPM | HEIGHT: 62 IN | DIASTOLIC BLOOD PRESSURE: 57 MMHG | RESPIRATION RATE: 18 BRPM | TEMPERATURE: 98.3 F | WEIGHT: 157 LBS | OXYGEN SATURATION: 100 % | SYSTOLIC BLOOD PRESSURE: 121 MMHG

## 2021-08-20 PROCEDURE — G0378 HOSPITAL OBSERVATION PER HR: HCPCS

## 2021-08-20 PROCEDURE — 6370000000 HC RX 637 (ALT 250 FOR IP): Performed by: STUDENT IN AN ORGANIZED HEALTH CARE EDUCATION/TRAINING PROGRAM

## 2021-08-20 PROCEDURE — 99024 POSTOP FOLLOW-UP VISIT: CPT | Performed by: ORTHOPAEDIC SURGERY

## 2021-08-20 PROCEDURE — 97530 THERAPEUTIC ACTIVITIES: CPT

## 2021-08-20 PROCEDURE — 97535 SELF CARE MNGMENT TRAINING: CPT

## 2021-08-20 PROCEDURE — 2580000003 HC RX 258: Performed by: STUDENT IN AN ORGANIZED HEALTH CARE EDUCATION/TRAINING PROGRAM

## 2021-08-20 RX ADMIN — LISINOPRIL 20 MG: 20 TABLET ORAL at 08:55

## 2021-08-20 RX ADMIN — OXYCODONE HYDROCHLORIDE 10 MG: 10 TABLET ORAL at 08:56

## 2021-08-20 RX ADMIN — ACETAMINOPHEN 650 MG: 325 TABLET ORAL at 05:39

## 2021-08-20 RX ADMIN — Medication 10 ML: at 08:56

## 2021-08-20 RX ADMIN — DOCUSATE SODIUM 50 MG AND SENNOSIDES 8.6 MG 1 TABLET: 8.6; 5 TABLET, FILM COATED ORAL at 08:55

## 2021-08-20 RX ADMIN — ACETAMINOPHEN 650 MG: 325 TABLET ORAL at 12:30

## 2021-08-20 RX ADMIN — ASPIRIN 325 MG: 325 TABLET, COATED ORAL at 08:56

## 2021-08-20 RX ADMIN — ACETAMINOPHEN 650 MG: 325 TABLET ORAL at 00:18

## 2021-08-20 RX ADMIN — AMLODIPINE BESYLATE 5 MG: 5 TABLET ORAL at 08:56

## 2021-08-20 ASSESSMENT — PAIN SCALES - GENERAL
PAINLEVEL_OUTOF10: 3
PAINLEVEL_OUTOF10: 4
PAINLEVEL_OUTOF10: 0
PAINLEVEL_OUTOF10: 6
PAINLEVEL_OUTOF10: 7

## 2021-08-20 ASSESSMENT — PAIN DESCRIPTION - PAIN TYPE
TYPE: SURGICAL PAIN
TYPE: SURGICAL PAIN

## 2021-08-20 ASSESSMENT — PAIN DESCRIPTION - PROGRESSION
CLINICAL_PROGRESSION: GRADUALLY IMPROVING
CLINICAL_PROGRESSION: GRADUALLY IMPROVING

## 2021-08-20 ASSESSMENT — PAIN DESCRIPTION - ORIENTATION
ORIENTATION: RIGHT
ORIENTATION: RIGHT

## 2021-08-20 ASSESSMENT — PAIN DESCRIPTION - ONSET
ONSET: ON-GOING
ONSET: GRADUAL

## 2021-08-20 ASSESSMENT — PAIN DESCRIPTION - DESCRIPTORS
DESCRIPTORS: ACHING;DISCOMFORT;SORE
DESCRIPTORS: ACHING;DISCOMFORT;SORE

## 2021-08-20 ASSESSMENT — PAIN DESCRIPTION - FREQUENCY
FREQUENCY: INTERMITTENT
FREQUENCY: INTERMITTENT

## 2021-08-20 ASSESSMENT — PAIN - FUNCTIONAL ASSESSMENT
PAIN_FUNCTIONAL_ASSESSMENT: PREVENTS OR INTERFERES SOME ACTIVE ACTIVITIES AND ADLS
PAIN_FUNCTIONAL_ASSESSMENT: PREVENTS OR INTERFERES SOME ACTIVE ACTIVITIES AND ADLS

## 2021-08-20 ASSESSMENT — PAIN DESCRIPTION - LOCATION
LOCATION: HIP
LOCATION: HIP

## 2021-08-20 NOTE — CARE COORDINATION
8/20, JOHN and [employer] from [facility] DME met with patient on [employer] since patient had commented on [employer] being to high for her. Patient explained that the [employer] that she received from DME which is at home was fine. [employer] confirmed that the [employer] she has at home is a [place]. [employer].  Patient was commenting on the [employer] she had used at the hospital.  Patient has [facility] for home. STEFFANY Ragland contacted and is aware of patient discharging today. Therapy that will go out to home will also assist in the FITO dressing of taking it off patient when time. This has been noted in the [address] order. No other needs identified at this time. Family to be transportation for patient. SW to follow for further discharge planning needs.       LAURA Schwab  [facility] Case Management  [phone]

## 2021-08-20 NOTE — PROGRESS NOTES
Department of Orthopedic Surgery  Joint Service  Resident Progress Note        Subjective:  No complaints this AM.  Urinary frequency improved. Urine labs sent yesterday. Feels okay for DC today. Vitals  VITALS:  BP (!) 128/57   Pulse 78   Temp 97.7 °F (36.5 °C) (Temporal)   Resp 16   Ht 5' 2\" (1.575 m)   Wt 157 lb (71.2 kg)   SpO2 95%   BMI 28.72 kg/m²     PHYSICAL EXAM:    Orientation:  alert and oriented to person, place and time    Right Lower Extremity    Incision:  dressing in place, dry and intact. Mild central drainage all contained within FITO. Holding suction. Lower Extremity Motor :  Dorsiflexion:  5/5  Plantarflexion:  5/5    Lower Extremity Sensory: intact    Pulses:   2    Abnormal Exam findings:  none      LABS:    HgB:    Lab Results   Component Value Date    HGB 11.4 08/19/2021     INR:  No results found for: PTINR  CBC:   Lab Results   Component Value Date    WBC 9.2 08/19/2021    RBC 4.02 08/19/2021    HGB 11.4 08/19/2021    HCT 36.2 08/19/2021    MCV 90.0 08/19/2021    MCH 28.4 08/19/2021    MCHC 31.5 08/19/2021    RDW 12.2 08/19/2021     08/19/2021    MPV 9.7 08/19/2021       ASSESSMENT AND PLAN:    Post operative day 3 status post right total Hip arthroplasty    1:  Weight bearing as tolerated  2:  Deep venous thrombosis prophylaxis -  BID  3:  Continue physical therapy  4:  D/C Plan:  [address] [facility] today. Medical management of urinary issues as needed. 5:  Pain Control:  IV and PO, wean to orals   6:  Doing well from an orthopedic standpoint. Okay for DC.

## 2021-08-20 NOTE — PROGRESS NOTES
Physical Therapy  Treatment Note    Name: Wilber Martell  : 1945  MRN: [id number]      Date of Service: 2021    Evaluating PT:  Lynda Elizabeth, PT [id number]      Room #:  2807/3126-P  Diagnosis:  Osteoarthritis of right hip, unspecified osteoarthritis type [M16.11]  PMHx/PSHx:     has a past surgical history that includes Rotator cuff repair (Bilateral) and Total hip arthroplasty (Right, 2021). has a past surgical history that includes Rotator cuff repair (Bilateral) and Total hip arthroplasty (Right, 2021). Procedure/Surgery:  21 right total hip arthroplasty  Precautions:  Falls, anterior hip precautions , WBAT (weight bearing as tolerated)  Equipment Needs:  Wheeled Walker,    SUBJECTIVE:    Patient lives alone  in a ranch home  with 3 steps to enter with 1Rail  Bed is on 1 floor and bath is on 1 floor. Patient ambulated independently and with cane  PTA. Equipment owned: [employer],      **Patient states she really wishes to return to home. OBJECTIVE:   Initial Evaluation  Date: 21 Treatment  21 AM  Short Term/ Long Term   Goals   AM-PAC 6 Clicks 37 46/35     Was pt agreeable to Eval/treatment? yes Yes     Does pt have pain? Yes  7/10 R hip     Bed Mobility  Rolling: NT  Supine to sit: Min  Sit to supine: NT  Scooting: Min NT  Pt reported independently getting OOB   Rolling: Ind  Supine to sit: Ind  Sit to supine: Ind  Scooting: Ind   Transfers Sit to stand: Min to fww  Stand to sit: Min  Stand pivot: Min with fww Sit to stand: Mod Independent   Stand to sit: Mod Independent   Stand pivot: Mod Independent   Verbally reviewed car transfer sequencing. Already completing prior to surgery  Sit to stand: Mod Ind  Stand to sit: Mod Ind  Stand pivot:  Mod Ind   Ambulation    40 feet with fww Min 200 feet with fww Mod Independent   100+ feet with fww Mod Ind   Stair negotiation: ascended and descended  NT Discussed sequencing and pt declined due to pain  3 steps with 1 rail Min ROM BUE:  wfl   BLE:  Decreased RLE. Decreased RLE ROM due to pain     Strength BUE: wfl   RLE:  4-/5  LLE:   4+/5 RLE:  4/5  LLE:   4+/5  4/5   Balance Sitting EOB:  Ind  Dynamic Standing:  Min with fww. Sitting EOB:  Ind  Dynamic Standing: Mod Ind with fww. Sitting EOB:  Ind  Dynamic Standing: Mod Ind     Patient is Alert & Oriented x person, place, time and situation and follows directions   Sensation:  Pt denies numbness and tingling to extremities  Edema:  RLE    Therapeutic Exercises:     STS x2 reps   Commode transfer 1 reps    BLE ROM-LAQ, APs, glut squeeze     Patient education  Pt educated on anterior hip precautions, mobility with fww. Reviewed hip precautions, car transfer and step negotiation. Patient response to education:   Pt verbalized understanding Pt demonstrated skill Pt requires further education in this area   yes yes Reminders     ASSESSMENT:  Comments:    Pt received sitting on commode. Pt demonstrated improved ability to perform transfers and hygiene. Pt reported having continued pain. Pt amb with improved gait speed and improved sera and posture with use of steph walker. Pt continued to increase distance this date but declined steps due to pain and fatigue from AM activities. Reviewed car transfer, stair negotiation and bed mobility. Pt declined performing bed mobility as well. Pt reported she has no concerns regarding mobility with return home. Pt returned to room and was left sitting up in chair with call button within reach. Treatment:  Patient practiced and was instructed in the following treatment:    · Therapeutic Exercises/Activities as noted above. ·  Reviewed stairs and car transfer.   · Patient education provided with continued safety with mobility, use of AAD with focus on approximation/negotiation, hand placement   · Patient education provided continuously throughout session with focus on correcting deviations or safety concerns observed throughout session. PHYSICAL THERAPY PLAN OF CARE:  Patient is making good progress towards goals. Will continue with current POC.      AM  Time in  0800         Time out  0830    Total Treatment Time  30 minutes     CPT codes:  [] PT Re-evaluation 07062  [] Gait training 87113 - minutes  [] Manual therapy 26560 - minutes  [x] Therapeutic activities 44806 30 minutes  [] Therapeutic exercises 59889 - minutes  [] Neuromuscular reeducation 75141 - minutes     Stan Ravi, PT, DPT  License [id number]

## 2021-08-20 NOTE — PROGRESS NOTES
Postop day 3 right total hip. Pt seen and examined by me. Findings and plan as documented per Orthopaedic Resident Surgeon note. Patient feeling well satisfactory progress with PT for discharge home today. Urine culture still pending but since symptoms largely resolved we will hold treatment but will follow up culture. Okay for discharge today. Office follow-up next week.

## 2021-08-20 NOTE — PLAN OF CARE
Problem: Falls - Risk of:  Goal: Will remain free from falls  Description: Will remain free from falls  8/20/2021 1336 by Erlinda Garcia RN  Outcome: Met This Shift  8/19/2021 2347 by Eduar Gardner RN  Outcome: Ongoing  Goal: Absence of physical injury  Description: Absence of physical injury  8/20/2021 1336 by Erlinda Garcia RN  Outcome: Met This Shift  8/19/2021 2347 by Eduar Gardner RN  Outcome: Ongoing     Problem: Pain:  Goal: Pain level will decrease  Description: Pain level will decrease  8/20/2021 1336 by Erlinda Garcia RN  Outcome: Met This Shift  8/19/2021 2347 by Eduar Gardner RN  Outcome: Ongoing  Goal: Control of acute pain  Description: Control of acute pain  8/20/2021 1336 by Erlinda Garcia RN  Outcome: Met This Shift  8/19/2021 2347 by Eduar Gardner RN  Outcome: Ongoing  Goal: Control of chronic pain  Description: Control of chronic pain  8/20/2021 1336 by Erlinda Garcia RN  Outcome: Met This Shift  8/19/2021 2347 by Eduar Gardner RN  Outcome: Ongoing     Problem: Pain - Acute:  Goal: Pain level will decrease  Description: Pain level will decrease  8/20/2021 1336 by Erlinda Garcia RN  Outcome: Met This Shift  8/19/2021 2347 by Eduar Gardner RN  Outcome: Ongoing     Problem: Discharge Planning:  Goal: Knowledge of discharge instructions  Description: Knowledge of discharge instructions  8/19/2021 2347 by Eduar Gardner RN  Outcome: Ongoing     Problem: Infection - Surgical Site:  Goal: Signs of wound healing will improve  Description: Signs of wound healing will improve  8/19/2021 2347 by Eduar Gradner RN  Outcome: Ongoing     Problem: Mobility - Impaired:  Goal: Achieve maximum mobility level  Description: Achieve maximum mobility level  8/20/2021 1336 by Erlinda Garcia RN  Outcome: Ongoing  8/19/2021 2347 by Eduar Gardner RN  Outcome: Ongoing

## 2021-08-20 NOTE — PROGRESS NOTES
Occupational Therapy  OT BEDSIDE TREATMENT NOTE   [facility] [place] [address]  [address] [place] [address]      Date:2021  Patient Name: Aminah Rubalcava  MRN: [id number]  : 1945  Room: [address] [place] [address]-     Referring [address], DO  Specific Provider Orders/Date: OT evaluation and treat 21     Evaluating OT: Toñito Urena. Topher, OTR/L #[id number]     Diagnosis:  OA R hip      Surgery: s/p R TIP 21     Pertinent Medical History: HTN     Precautions:  Fall Risk, WBAT to RLE , anterolateral hip precautions     Assessment of current deficits   [x]? Functional mobility             [x]?ADLs           []? Strength                  []?Cognition   [x]? Functional transfers           [x]? IADLs         [x]? Safety Awareness   [x]? Endurance   []? Fine Coordination              [x]? Balance      []? Vision/perception   []? Sensation     []? Gross Motor Coordination  []? ROM           []?  Delirium                   []? Motor Control      OT PLAN OF CARE   OT POC based on physician orders, patient diagnosis and results of clinical assessment     Frequency/Duration   2-4 days/wk for 1 week PRN   Specific OT Treatment Interventions to include:   * Instruction/training on adapted ADL techniques and AE recommendations to increase functional independence within precautions       * Training on energy conservation strategies, correct breathing pattern and techniques to improve independence/tolerance for self-care routine  * Functional transfer/mobility training/DME recommendations for increased independence, safety, and fall prevention  * Patient/Family education to increase follow through with safety techniques and functional independence  * Recommendation of environmental modifications for increased safety with functional transfers/mobility and ADLs  * Therapeutic activities to facilitate/challenge dynamic balance, stand tolerance for increased safety and independence with ADLs  * Therapeutic activities to facilitate gross/fine motor skills for increased independence with ADLs  Other: anterolateral hip precautions     Recommended Adaptive Equipment: steph ww, ext tub bench, AE for LE dressing and bathing(issued 8/19), HR by commode     Home Living: Pt lives alone in a one story home with 3 steps and one hand rails. Bed and bath on main floor with laundry in basement with full flight of  steps and one HR's. Bathroom setup: tub shower with HR   Equipment owned: spc     Prior Level of Function: Independent with ADLs , Independent with IADLs; ambulated spc  Driving: yes  Occupation: retired  Medication management: self  Leisure: enjoys games on computer     Pain Level: 7/10 R hip pain, agreeable for therapy, nsg informed pt requesting pain meds at end of treatment  Cognition: A&O: 4/4; Follows multi step directions, pleasant & cooperative, eager to return home              Memory:  Fair +               Sequencing:  Good-              Problem solving:  Good-              Judgement/safety:  Good-                Functional Assessment:  AM-PAC Daily Activity Raw Score: 19/24    Initial Eval Status  Date: 8/18/21 Treatment Status  Date:   8/20/21 STGs = LTGs  Time frame: 2-4 days   Feeding Independent  Ind     Grooming Setup sitting  SBA  To wash hands standing at sink  Mod I standing   UB Dressing SBA sitting EOB SBA  seated  Mod I    LB Dressing Max A  Min A  Good recall of use of AE for doff/edwardo socks, instructed & educated on use of reacher to complete LB dressing with Fair+ results Mod I with AE prn   Bathing Simulated mod A  Min A  Simulated seated    Showed pt of ext tub bench, with pt reporting that would be best for her, she will speak with her daughter who works for [employer] SBA seated with AE prn   Toileting Mod A  SBA  Pt able to complete hygiene  Mod I    Bed Mobility  Supine to sit: min A   Sit to supine:  n/t  SBA- supine>sit  Educated pt on technique to increase independence. Supine to sit: mod I   Sit to supine: mod i   Functional Transfers Min A with ww sit to stand  SBA- sit<->stand  From commode (with BSC placed for HR)  & transferred from Bedside chair  Cuing for hand placement and body mechanics   Mod I with ww   Functional Mobility Min A in room with ww  SBA  To and from bathroom using w/w  Mod I with ww   Balance Sitting:     Static:  SBA    Dynamic:SBA  Standing: min A  Sitting:     Static:  Ind    Dynamic: Ind  Standing: SBA                                                                       Activity Tolerance Good- O2 96% RA  Good-  Light/moderate task   Due to pain  good   Visual/  Perceptual Glasses: yes WFL           Safety Good-  good-                                 Good follow through of anterolateral hip precautions       Comments: Upon arrival pt was in bathroom & agreeable for therapy. Educated pt on anterior hip precautions, demonstrates fair+ understanding. Pt educated on techniques & use of AE to increase independence and safety during ADL's, functional transfers & mobility. Discussed home set up with pt, giving suggestions to increase safety at discharge. At end of session pt left seated in bedside chair, tray table in front, call light within reach. · Pt has made Good progress towards set goals. · Continue with current plan of care    Treatment Time In: 8:20            Treatment Time Out: 8:45             Treatment Charges: Mins Units   Ther Ex  61015     Manual Therapy [address] [facility]     Thera Activities 71818 10 1   ADL/Home Mgt 16452 15 1   Neuro Re-ed 28613     Group Therapy      Orthotic manage/training  52992     Non-Billable Time     Total Timed Treatment 25 2     Rosio SHEFFIELD  [address] [place] [address], [address] [place] [address]

## 2021-08-20 NOTE — PLAN OF CARE
Problem: Falls - Risk of:  Goal: Will remain free from falls  Description: Will remain free from falls  8/19/2021 2347 by Ko Rivers RN  Outcome: Ongoing     Problem: Falls - Risk of:  Goal: Absence of physical injury  Description: Absence of physical injury  8/19/2021 2347 by Ko Rivers RN  Outcome: Ongoing     Problem: Pain:  Goal: Pain level will decrease  Description: Pain level will decrease  8/19/2021 2347 by Ko Rivers RN  Outcome: Ongoing     Problem: Pain:  Goal: Control of acute pain  Description: Control of acute pain  8/19/2021 2347 by Ko Rivers RN  Outcome: Ongoing     Problem: Pain:  Goal: Control of chronic pain  Description: Control of chronic pain  8/19/2021 2347 by Ko Rivers RN  Outcome: Ongoing     Problem: Discharge Planning:  Goal: Knowledge of discharge instructions  Description: Knowledge of discharge instructions  Outcome: Ongoing     Problem: Infection - Surgical Site:  Goal: Signs of wound healing will improve  Description: Signs of wound healing will improve  Outcome: Ongoing     Problem: Mobility - Impaired:  Goal: Achieve maximum mobility level  Description: Achieve maximum mobility level  Outcome: Ongoing     Problem: Pain - Acute:  Goal: Pain level will decrease  Description: Pain level will decrease  8/19/2021 2347 by Ko Rivers RN  Outcome: Ongoing  8/19/2021 1042 by Raffy Christianson RN  Outcome: Met This Shift

## 2021-08-21 LAB
ORGANISM: ABNORMAL
URINE CULTURE, ROUTINE: ABNORMAL
URINE CULTURE, ROUTINE: ABNORMAL

## 2021-08-26 ENCOUNTER — OFFICE VISIT (OUTPATIENT)
Dept: ORTHOPEDIC SURGERY | Age: 76
End: 2021-08-26

## 2021-08-26 VITALS — WEIGHT: 157 LBS | BODY MASS INDEX: 28.89 KG/M2 | HEIGHT: 62 IN

## 2021-08-26 DIAGNOSIS — Z96.641 STATUS POST TOTAL HIP REPLACEMENT, RIGHT: Primary | ICD-10-CM

## 2021-08-26 PROCEDURE — 99024 POSTOP FOLLOW-UP VISIT: CPT | Performed by: ORTHOPAEDIC SURGERY

## 2021-08-26 RX ORDER — ASPIRIN 325 MG
TABLET ORAL
COMMUNITY
Start: 2021-08-20 | End: 2021-08-26 | Stop reason: SDUPTHER

## 2021-08-26 RX ORDER — ACETAMINOPHEN 500 MG
1000 TABLET ORAL EVERY 6 HOURS PRN
COMMUNITY
End: 2022-03-06

## 2021-08-26 RX ORDER — OXYCODONE HYDROCHLORIDE AND ACETAMINOPHEN 5; 325 MG/1; MG/1
1 TABLET ORAL EVERY 8 HOURS PRN
Qty: 30 TABLET | Refills: 0 | Status: SHIPPED | OUTPATIENT
Start: 2021-08-26 | End: 2021-09-09

## 2021-08-26 RX ORDER — OXYCODONE HYDROCHLORIDE 5 MG/1
5 CAPSULE ORAL EVERY 6 HOURS PRN
COMMUNITY
End: 2021-09-23

## 2021-08-26 NOTE — PROGRESS NOTES
Chief Complaint:   Chief Complaint   Patient presents with    Post-Op Check     Post-op Rt TIP 8/17/2021. Doing well. HPI 9 days postop right total hip. She is at home with her family. Getting around with a walker getting home therapy preoperative pain  well relieved. Patient Active Problem List   Diagnosis    Primary osteoarthritis of right hip    Osteoarthritis of right hip       Past Medical History:   Diagnosis Date    Hypertension        Past Surgical History:   Procedure Laterality Date    ROTATOR CUFF REPAIR Bilateral     TOTAL HIP ARTHROPLASTY Right 8/17/2021    HIP TOTAL ARTHROPLASTY----KANCHAN performed by Moriah Nieves MD at [facility] OR       Current Outpatient Medications   Medication Sig Dispense Refill    acetaminophen (TYLENOL) 500 MG tablet Take 1,000 mg by mouth every 6 hours as needed for Pain      oxyCODONE 5 MG capsule Take 5 mg by mouth every 6 hours as needed for Pain.  oxyCODONE-acetaminophen (PERCOCET) 5-325 MG per tablet Take 1 tablet by mouth every 8 hours as needed for Pain for up to 14 days. 30 tablet 0    aspirin 325 MG EC tablet Take 1 tablet by mouth 2 times daily for 28 days 56 tablet 0    oxybutynin (DITROPAN) 5 MG tablet Take 5 mg by mouth daily      amLODIPine-benazepril (LOTREL) 5-20 MG per capsule Take 1 capsule by mouth daily.  ibuprofen (ADVIL;MOTRIN) 800 MG tablet Take 1 tablet by mouth every 8 hours as needed       ammonium lactate (AMLACTIN) 12 % cream Apply topically as needed for Dry Skin  (Patient not taking: Reported on 8/26/2021)       No current facility-administered medications for this visit. Allergies   Allergen Reactions    Darvocet [Propoxyphene N-Acetaminophen]     Darvon [Propoxyphene Hcl]        Social History     Socioeconomic History    Marital status:       Spouse name: None    Number of children: None    Years of education: None    Highest education level: None   Occupational History    None Tobacco Use    Smoking status: Never Smoker    Smokeless tobacco: Never Used   Vaping Use    Vaping Use: Never used   Substance and Sexual Activity    Alcohol use: Yes     Comment: social    Drug use: Never    Sexual activity: None   Other Topics Concern    None   Social History Narrative    None     Social Determinants of Health     Financial Resource Strain:     Difficulty of Paying Living Expenses:    Food Insecurity:     Worried About Running Out of Food in the Last Year:     Ran Out of Food in the Last Year:    Transportation Needs:     Lack of Transportation (Medical):  Lack of Transportation (Non-Medical):    Physical Activity:     Days of Exercise per Week:     Minutes of Exercise per Session:    Stress:     Feeling of Stress :    Social Connections:     Frequency of Communication with Friends and Family:     Frequency of Social Gatherings with Friends and Family:     Attends Faith Services:     Active Member of Clubs or Organizations:     Attends Club or Organization Meetings:     Marital Status:    Intimate Partner Violence:     Fear of Current or Ex-Partner:     Emotionally Abused:     Physically Abused:     Sexually Abused:        History reviewed. No pertinent family history. Review of Systems   No fever, chills, or other constitutionalsymptoms. No numbness or other neuro symptoms. No chest pain. No dyspnea. [unfilled]   Right hip incision healing cleanly. Tiny amount of bloody drainage at the staples which were removed. No erythema no cellulitis no expressible fluid or palpable retained fluid. The leg length restored to slightly greater than her left leg which is also affected by hip arthritis. There is very little discomfort with improved right hip rotation. She is able to full weight-bear on the right hip with 1 hand assistance. Physical Exam    Patient is alert and oriented. Well-developed well-nourished. Pupils equal and reactive. Scleraeanicteric. Neck supple  Lungs clear. Cardiac rate and rhythm regular. Abdomen soft and nontender. Skin warm and dry. XRAY: X-ray today AP pelvis with AP and lateral views right hip. Right total hip arthroplasty with good fit and alignment unchanged from postop. No subsidence or other complicating feature. Significant degenerative changes noted to be present in the left hip. Impression: Intact right total hip arthroplasty in good position. Osteoarthritis left hip. ASSESSMENT/PLAN:    Ninoska Whitaker was seen today for post-op check. Diagnoses and all orders for this visit:    Status post total hip replacement, right  -     XR HIP 2-3 VW W PELVIS RIGHT; Future  -     Ambulatory referral to Physical Therapy  -     oxyCODONE-acetaminophen (PERCOCET) 5-325 MG per tablet; Take 1 tablet by mouth every 8 hours as needed for Pain for up to 14 days. Findings and images reviewed with the patient and her daughter. Initial progress is satisfactory. Progress to outpatient therapy and from walker to cane as comfort and balance allow. Her Percocet was refilled with instructions on weaning it. Return in about 4 weeks (around 9/23/2021) for exam and xray rt TIP.        Santana Gould MD    8/26/2021  11:35 AM

## 2021-08-31 ENCOUNTER — EVALUATION (OUTPATIENT)
Dept: PHYSICAL THERAPY | Age: 76
End: 2021-08-31
Payer: COMMERCIAL

## 2021-08-31 DIAGNOSIS — Z96.641 STATUS POST TOTAL HIP REPLACEMENT, RIGHT: Primary | ICD-10-CM

## 2021-08-31 PROCEDURE — 97161 PT EVAL LOW COMPLEX 20 MIN: CPT | Performed by: PHYSICAL THERAPIST

## 2021-08-31 PROCEDURE — 97110 THERAPEUTIC EXERCISES: CPT | Performed by: PHYSICAL THERAPIST

## 2021-08-31 NOTE — PROGRESS NOTES
[address] [facility]   Phone: [phone]   Fax: [phone]           Date:  2021   Patient: Wan Anderson  : 1945  MRN: [id number]  Referring Provider: Naomi Esposito MD  [address] [place] [address]     Medical Diagnosis:     U46.899 (ICD-10-CM) - Status post total hip replacement, right    SUBJECTIVE:   Surgery Date: 2021    Surgical Procedure: R TIP    Onset date: years    Onset: gradual    Mechanism of Injury: Pt reports to outpt PT following successful elective R TIP. She had 2 weeks of in home therapy and was d/c yesterday. She reports she is also in need of L TKA. She is utilizing [employer] full time. She lives alone and reports that her kids help c activities in the home. She has 3 steps to enter and no steps inside.     Previous PT: none    Medical Management for Current Problem: Pain medication and surgery    Chief complaint: pain, edema, decreased motion, decreased mobility, weakness, inability / limited ability to use leg, difficulty walking, unable to run / difficulty running , difficulty with stairs, limited ability to complete ADLs (dressing , toileting , transferring , walking), limited ability to complete IADLs (cooking, cleaning, transportation , laundry), limited ability to lift/carry/handle material, limited ability to complete home/outdoor chores/tasks, inability to participate in exercise regimen / fitness program, limited tolerance to weightbearing tasks and weightbearing duration, decreased endurance    Behavior: condition is getting better    Pain: waxing and waning  Current: 4/10         Symptom Type/Quality: dull, aching  Location[de-identified] Hip: anterior thigh          Provoking Activities/Positions: sitting, standing, walking, prolonged sitting, prolonged standing, walking long distances, being in one position too long , bending, lifting/carrying/material handling                 Relieving Activitie/Positions: medication    Disturbed Sleep: no Imaging results: XR CHEST (2 VW)    Result Date: 8/10/2021  EXAMINATION: TWO XRAY VIEWS OF THE CHEST 8/10/2021 12:37 pm COMPARISON: 08/25/2020 HISTORY: ORDERING SYSTEM PROVIDED HISTORY: Preop testing TECHNOLOGIST PROVIDED HISTORY: What reading provider will be dictating this exam?->CRC FINDINGS: The lungs are without acute focal process. There is no effusion or pneumothorax. The cardiomediastinal silhouette is without acute process. The osseous structures are without acute process. No acute process. XR PELVIS (1-2 VIEWS)    Result Date: 8/17/2021  EXAMINATION: ONE XRAY VIEW OF THE PELVIS; TWO XRAY VIEWS OF THE RIGHT HIP 8/17/2021 10:15 am COMPARISON: 07/14/2021. HISTORY: ORDERING SYSTEM PROVIDED HISTORY: post op TECHNOLOGIST PROVIDED HISTORY: Reason for exam:->post op What reading provider will be dictating this exam?->CRC FINDINGS: There is a total right hip prosthesis. Components appear in satisfactory location orientation. Postop soft tissue changes are noted. There is moderately severe degenerative change about the left hip joint superolaterally. Normal appearing right hip arthroplasty     XR HIP RIGHT (2-3 VIEWS)    Result Date: 8/17/2021  EXAMINATION: ONE XRAY VIEW OF THE PELVIS; TWO XRAY VIEWS OF THE RIGHT HIP 8/17/2021 10:15 am COMPARISON: 07/14/2021. HISTORY: ORDERING SYSTEM PROVIDED HISTORY: post op TECHNOLOGIST PROVIDED HISTORY: Reason for exam:->post op What reading provider will be dictating this exam?->CRC FINDINGS: There is a total right hip prosthesis. Components appear in satisfactory location orientation. Postop soft tissue changes are noted. There is moderately severe degenerative change about the left hip joint superolaterally. Normal appearing right hip arthroplasty     XR HIP 2-3 VW W PELVIS RIGHT    Result Date: 8/26/2021  Radiology exam is complete. No Radiologist dictation. Please follow up with ordering provider.        Past Medical History:  Past Medical History: 40° Abduction,  35° ER, 20° IR      Knee:  Right:   AROM:  WFL    Left:   AROM: WFL          Strength:    Trunk: mildly decreased  Hip:  Right: Flexion 3+/5,  Extension 3+/5, Abduction 3+/5, ER 3+/5, IR 3+/5    Left: Flexion 4/5,  Extension 4/5, Abduction 4/5, ER 4/5, IR 4/5     Knee:   Right: Flexion 4/5,  Extension 4/5  Left: Flexion 4+/5,  Extension 4+/5     Palpation: Tender to palpation lateral hip, incision      Sensation: intact to light touch and temperature. Special Tests: NT d/t post op    Comments: restricted motion in B knees    ASSESSMENT     Outcome Measure:   LEFS14/80    Problems:    Pain reported 4/10   ROM decreased    Strength decreased   Decreased functional ability with walking, stairs, standing, sitting, ADLs as noted above, IADLs as noted above, use of right lower extremity, use of left lower extremity, limited tolerance to weightbearing tasks and weightbearing duration, bending, reaching, lifting, carrying, driving, inability to participate in exercise regimen / fitness program    Reason for Skilled Care: Pt reports to PT following successful R TIP. She has some general weakness and ROM restriction. Pt requires skilled care to improve global ROM, strength, stability, and overall fxn mobility needed for ADL, rec, and work activity. [x] There are no barriers affecting plan of care or recovery    [] Barriers to this patient's plan of care or recovery include.     Domestic Concerns:  [x] No  [] Yes:      Long Term goals (4-6 weeks)   Decrease reported pain to 0/10   Increase ROM to 90° Flexion,   45° Abduction,  35° ER, 20° IR   Increase Strength to 4+/5 globally    Able to perform/complete the following functions/tasks: Pt will ambulate for 10 min s AD or limitation, able to negotiate a flight of stairs recip s pain, limitation, or HR, able to perform 10 STS transfers s pain or limitation or HHA       LEFS 40-60/80   Independent with Home Exercise Programs    Rehab Potential: [x] Good  [] Fair  [] Poor    PLAN       Treatment Plan:   [x] Therapeutic Exercise  [x] Therapeutic Activity  [x] Neuromuscular Re-education   [x] Gait Training  [x] Balance Training  [x] Aerobic conditioning  [x] Manual Therapy  [x] Massage/Fascial release   [] Work/Sport specific activities    [] Pain Neuroscience [x] Cold/hotpack  [] Vasocompression  [x] Electrical Stimulation  [] Lumbar/Cervical Traction  [x] Ultrasound   [] Iontophoresis: 4 mg/mL Dexamethasone Sodium Phosphate 40-80 mAmin  [x] Dry Needling      [x] Instruction in HEP      []  Medication allergies reviewed for use of Dexamethasone Sodium Phosphate 4mg/ml  with iontophoresis treatments. Patient is not allergic. The following CPT codes are likely to be used in the care of this patient: 11319 PT Evaluation: Low Complexity   74571 PT Re-Evaluation   39620 Therapeutic Exercise   76408 Neuromuscular Re-Education   85148 Therapeutic Activities   93227 Manual Therapy   33550 Gait Training   90439 Vasopneumatic Device   39193      Suggested Professional Referral: [x] No  [] Yes:     Patient Education:  [x] Plans/Goals, Risks/Benefits discussed  [x] Home exercise program  Method of Education: [x] Verbal  [x] Demo  [x] Written  Comprehension of Education:  [x] Verbalizes understanding. [x] Demonstrates understanding. [] Needs Review. [] Demonstrates/verbalizes understanding of HEP/Ed previously given. Frequency:  1-2 days per week for 4-6 weeks    Patient understands diagnosis/prognosis and consents to treatment, plan and goals: [x] Yes    [] No     Thank you for the opportunity to work with your patient. If you have questions or comments, please contact me at numbers listed above. Electronically signed by: Renetta Cason, PT DPT [id number]         Please sign Physician's Certification and return to:   [address]  [address] [place] [address]  Dept: [phone]  Dept Fax: [phone] PT DPT [id number]    [id number] Certification / Comments     Frequency/Duration 1-2 days per week for 4-6 weeks. Certification period from 8/31/2021  to 10/31/2021. I have reviewed the Plan of Care established for skilled therapy services and certify that the services are required and that they will be provided while the patient is under my care.     Physician's Comments/Revisions:               Physician's Printed Name:                                           [de-identified] Signature:                                                               Date:

## 2021-08-31 NOTE — PROGRESS NOTES
[address] [facility]   Phone: [phone]   Fax: [phone]      Physical Therapy Daily Treatment Note    Date: 2021  Patient Name: Wan Anderson  : 1945   MRN: [id number]  DOInjury: years   DOSx:   Referring Provider: Naomi Esposito MD  [address] [place] [address]     Medical Diagnosis:      Diagnosis Orders   1. Status post total hip replacement, right         Outcome Measure:      S: see eval  O:   Time 8603-1505     Visit  Repeat outcome measure at mid point and end. Pain 4/10                       Modalities            Manual            Stretch      IT band supine      HS supine      Quad Prone      Exercise      Bike      Supine hip abd/add 2x10 c PT assist TE   Seated hip add  3x10 pillow TE   Seated march 2x10 [place] TB TE   Seated clams 3x10 peach TE         SLR      SAQ      LAQ      Hamstring Curl       TG squats      TG calf raises      Step-ups - FWD      Step-ups - LAT      Step-ups - BWD        NMR To improve balance for safe community and home ambulation    Resisted walk      FWD      BKWD      lat      March      Side stepping      Retro walk      Heel to toe      A:  Tolerated well. Above added to written HEP.   P: Continue with rehab plan  Chata Hutson, PT DPT, PT [id number]    Treatment Charges: Mins Units   Initial Evaluation 20 1   Re-Evaluation     Ther Exercise         TE 12 1   Manual Therapy     MT     Ther Activities        TA     Gait Training          GT     Neuro Re-education NR     Modalities     Non-Billable Service Time     Other     Total Time/Units 32 2

## 2021-08-31 NOTE — DISCHARGE SUMMARY
Physician Discharge Summary    Patient ID:  Eder Cool  [id number]  1 y.o.  1945    Admit date: 8/17/2021    Discharge date and time: 8/20/2021  2:57 PM     Admitting Physician: Regina Aguiar MD     Discharge Physician: Regina Aguiar MD    Admission Diagnoses: Osteoarthritis of right hip, unspecified osteoarthritis type [M16.11]    Discharge Diagnoses: s/p right total hip arthroplasty    Admission Condition: good    Discharged Condition: good    Hospital Course: The patient was admitted on 8/17/2021. The patient underwent an uneventful course of right total hip arthroplasty by Regina Aguiar MD on 8/17/21. The patient was subsequently taken to the PACU and the floor in stable condition. The patient was started on pain control, DVT prophylaxis, antibiotics, and physical therapy as indicated. Blood counts were followed as needed. Discharge planning was performed and tailored in regards to patient progress, therapy progress, home needs, and support. Once the patient had made appropriate gains, they were able to be discharged to home. Treatments: s/p right total hip arthroplasty    Disposition: The patient was provided instructions to continue antibiotics, pain medication, DVT prophylaxis, Dressing changes as applicable. The patient was instructed on restrictions and activities. The patient was to follow up with Regina Aguiar MD, and to call the office for an appointment.        Medication List      START taking these medications    aspirin 325 MG EC tablet  Take 1 tablet by mouth 2 times daily for 28 days        ASK your doctor about these medications    amLODIPine-benazepril 5-20 MG per capsule  Commonly known as: LOTREL     ammonium lactate 12 % cream  Commonly known as: AMLACTIN     ibuprofen 800 MG tablet  Commonly known as: ADVIL;MOTRIN     oxybutynin 5 MG tablet  Commonly known as: DITROPAN     oxyCODONE 5 MG immediate release tablet  Commonly known as: Roxicodone  Take 1 tablet by mouth every 6 hours as needed for Pain for up to 7 days. Intended supply: 7 days. Take lowest dose possible to manage pain  Ask about: Should I take this medication?            Where to Get Your Medications      You can get these medications from any pharmacy    Bring a paper prescription for each of these medications  · aspirin 325 MG EC tablet  · oxyCODONE 5 MG immediate release tablet       Signed:  Deanna Perez DO  8/31/2021  10:09 AM

## 2021-09-02 ENCOUNTER — TREATMENT (OUTPATIENT)
Dept: PHYSICAL THERAPY | Age: 76
End: 2021-09-02
Payer: COMMERCIAL

## 2021-09-02 DIAGNOSIS — Z96.641 STATUS POST TOTAL HIP REPLACEMENT, RIGHT: Primary | ICD-10-CM

## 2021-09-02 PROCEDURE — 97530 THERAPEUTIC ACTIVITIES: CPT | Performed by: PHYSICAL THERAPIST

## 2021-09-02 PROCEDURE — 97110 THERAPEUTIC EXERCISES: CPT | Performed by: PHYSICAL THERAPIST

## 2021-09-02 NOTE — PROGRESS NOTES
[address] [facility] [facility] [address] [facility]   Phone: [phone]   Fax: [phone]      Physical Therapy Daily Treatment Note    Date: 2021  Patient Name: Kae Wall  : 1945   MRN: [id number]  DOInjury: years   DOSx:   Referring Provider: Klaus Vera MD  [address] [place] [address]     Medical Diagnosis:     Difficulty Walking. Gait Disturbance. Outcome Measure:     S:     O:   Time 2396-1589     Visit  Repeat outcome measure at mid point and end. Pain 4/10                       Modalities            Manual            Stretch      IT band supine      HS supine      Quad Prone      Exercise      Bike      Seated hip add  3x 10 Ball squeeze TE   Seated march 3x 10 Blue band TE   Seated clams 3x 10 Blue band TE   Seated leg curl 3x 10 R & L Blue band TE   LAQ 3x 10 R & L Blue band NR   Seated marching 3x 10 R & L 2lb cuff weights TE   LAQ 3x 10 R & L 2lb cuff weights TE   Gait & transfer train with RW 2021 Verbal commands for safety, sequencing, & posture TA   TG squats      TG calf raises      Step-ups - FWD      Step-ups - LAT      Step-ups - BWD        NMR To improve balance for safe community and home ambulation    Resisted walk      FWD      BKWD      lat      March      Side stepping      Retro walk      Heel to toe      A:  Tolerated well. The pt experienced difficulty with experiences due to left knee pain. P: Continue with rehab plan & progress to include standing exercises when appropriate.      Early Matthias, PT OHPT [id number]    Treatment Charges: Mins Units   Initial Evaluation     Re-Evaluation     Ther Exercise         TE 30 2   Manual Therapy     MT     Ther Activities        TA 9 1   Gait Training          GT     Neuro Re-education NR     Modalities     Non-Billable Service Time     Other     Total Time/Units 39 3

## 2021-09-07 ENCOUNTER — TELEPHONE (OUTPATIENT)
Dept: ORTHOPEDIC SURGERY | Age: 76
End: 2021-09-07

## 2021-09-07 NOTE — TELEPHONE ENCOUNTER
9/07/2021 Received a fax from [employer]: A form, F2F Encounter.      Completed and placed on your desk for your signature

## 2021-09-08 ENCOUNTER — TREATMENT (OUTPATIENT)
Dept: PHYSICAL THERAPY | Age: 76
End: 2021-09-08
Payer: COMMERCIAL

## 2021-09-08 DIAGNOSIS — Z96.641 STATUS POST TOTAL HIP REPLACEMENT, RIGHT: Primary | ICD-10-CM

## 2021-09-08 PROCEDURE — 97530 THERAPEUTIC ACTIVITIES: CPT | Performed by: PHYSICAL THERAPIST

## 2021-09-08 PROCEDURE — 97110 THERAPEUTIC EXERCISES: CPT | Performed by: PHYSICAL THERAPIST

## 2021-09-09 NOTE — PROGRESS NOTES
[address] [facility] [address] [facility]   Phone: [phone]   Fax: [phone]      Physical Therapy Daily Treatment Note    Date: 2021  Patient Name: Jermain Carmona  : 1945   MRN: [id number]  DOInjury: years   DOSx:   Referring Provider: Ludmila Mack MD  [address] [place] [address]     Medical Diagnosis:     Difficulty Walking. Gait Disturbance. Outcome Measure:     S:     O:   Time 3393-7708     Visit 3/8 Repeat outcome measure at mid point and end. Pain 4/10                       Modalities            Manual            Stretch      IT band supine      HS supine      Quad Prone      Exercise      Bike      Seated hip add  3x 10 Ball squeeze TE   Seated march 3x 10 Blue band TE   Seated clams 3x 10 Blue band TE   Seated leg curl 3x 10 R & L Blue band TE   LAQ 3x 10 R & L Blue band TE   Seated marching 3x 10 R & L 2lb cuff weights TE   LAQ 3x 10 R & L 2lb cuff weights TE   Gait & transfer train with RW 2021  Pt AMB within the facility, through doorways, & outside towards car Verbal commands for safety, sequencing, & posture TA   Band ankle dorsiflex & plantarflex 3x 10 R & L Blue band TE   Seated ankle pumps 3x 10 2lb cuff weights TE   Step-ups - FWD      Step-ups - LAT      Step-ups - BWD        NMR To improve balance for safe community and home ambulation    Resisted walk      FWD      BKWD      lat      March      Side stepping      Retro walk      Heel to toe      A:  Tolerated well. The pt performed transfers & AMB modified independent with RW.    P: Continue with rehab plan & progress to include standing exercises band TKE when appropriate.     James Menchaca, PT OHPT [id number]    Treatment Charges: Mins Units   Initial Evaluation     Re-Evaluation     Ther Exercise         TE 30 2   Manual Therapy     MT     Ther Activities        TA 10 1   Gait Training          GT     Neuro Re-education NR     Modalities     Non-Billable Service Time     Other     Total Time/Units 40 3

## 2021-09-13 ENCOUNTER — TREATMENT (OUTPATIENT)
Dept: PHYSICAL THERAPY | Age: 76
End: 2021-09-13
Payer: COMMERCIAL

## 2021-09-13 DIAGNOSIS — Z96.641 STATUS POST TOTAL HIP REPLACEMENT, RIGHT: Primary | ICD-10-CM

## 2021-09-13 PROCEDURE — 97112 NEUROMUSCULAR REEDUCATION: CPT | Performed by: PHYSICAL THERAPIST

## 2021-09-13 PROCEDURE — 97530 THERAPEUTIC ACTIVITIES: CPT | Performed by: PHYSICAL THERAPIST

## 2021-09-13 PROCEDURE — 97110 THERAPEUTIC EXERCISES: CPT | Performed by: PHYSICAL THERAPIST

## 2021-09-13 NOTE — PROGRESS NOTES
[address] [facility]   Phone: [phone]   Fax: [phone]      Physical Therapy Daily Treatment Note    Date: 2021  Patient Name: Daya Pickett  : 1945   MRN: [id number]  DOInjury: years   DOSx:   Referring Provider: No referring provider defined for this encounter. Medical Diagnosis:     Difficulty Walking. Gait Disturbance. Outcome Measure:     S: Pt reports that she is doing well overall. She continues to utilize the [employer] full time. O:   Time 347-427     Visit  Repeat outcome measure at mid point and end. Pain 4/10                       Modalities            Manual            Stretch      IT band supine      HS supine      Quad Prone      Exercise      Bike      Seated hip add  3x 10 Ball squeeze TE   Seated march 3x 10 Blue band TE   Seated clams 3x 10 Blue band TE   Blue band TE   LAQ 3x 10 R & L Blue band TE               Verbal commands for safety, sequencing, & posture TA   Blue band TE   2lb cuff weights TE   Step-ups - FWD      Step-ups - LAT      Step-ups - BWD            Standing SLR 3 direction x20 B YTB NR   Resisted step x20 B YTB TA   Standing hs curl x20 B YTB NR   Mini squats x30   TA         A:  Tolerated well. Pt was progressed c functional mobility to improve stability and ADLs. She tolerated the session well c moderate fatigue and no incr in pain. P: Continue with rehab plan & progress to include standing exercises band TKE when appropriate.     Jaquelin Mitchell PT DPT [id number]     Treatment Charges: Mins Units   Initial Evaluation     Re-Evaluation     Ther Exercise         TE 10 1   Manual Therapy     MT     Ther Activities        TA 15 1   Gait Training          GT     Neuro Re-education NR 15 1   Modalities     Non-Billable Service Time     Other     Total Time/Units 40 3

## 2021-09-15 ENCOUNTER — TREATMENT (OUTPATIENT)
Dept: PHYSICAL THERAPY | Age: 76
End: 2021-09-15
Payer: COMMERCIAL

## 2021-09-15 DIAGNOSIS — Z96.641 STATUS POST TOTAL HIP REPLACEMENT, RIGHT: Primary | ICD-10-CM

## 2021-09-15 PROCEDURE — 97530 THERAPEUTIC ACTIVITIES: CPT | Performed by: PHYSICAL THERAPIST

## 2021-09-15 PROCEDURE — 97112 NEUROMUSCULAR REEDUCATION: CPT | Performed by: PHYSICAL THERAPIST

## 2021-09-15 NOTE — PROGRESS NOTES
[address] [facility]   Phone: [phone]   Fax: [phone]      Physical Therapy Daily Treatment Note    Date: 2021  Patient Name: Tayler Boone  : 1945   MRN: [id number]  DOInjury: years   DOSx:   Referring Provider: Natalio Choi MD  [address] [place] [address]     Medical Diagnosis:     Difficulty Walking. Gait Disturbance. Outcome Measure:     S: Pt reports that she has some stomach issues today. She believes its d/t aspirin prescribed post op    O:   Time 144-224     Visit  Repeat outcome measure at mid point and end. Pain 4/10                       Modalities            Manual            Stretch      IT band supine      HS supine      Quad Prone      Exercise      Bike      Ball squeeze TE   Blue band TE   Blue band TE   Blue band TE   LAQ 3x 10 R & L 2 lb NR               Verbal commands for safety, sequencing, & posture TA   Blue band TE   2lb cuff weights TE   Step-ups - FWD x20 B 2 lb  TA   Step-ups - LAT x20 B 2 lb TA   Step-ups - BWD            Standing SLR 3 direction x20 B 2 lb NR   YTB TA   Standing hs curl x20 B YTB NR   Mini squats x30   TA   Gait training 4 min  TA   A:  Tolerated well. Pt was progressed today c step up activity and SC training. She tolerated the session well c moderate fatigue and no pain. She was educated to progress to [facility] in home and community as she feels comfortable. P: Continue with rehab plan & progress to include standing exercises band TKE when appropriate.     Mariano Orozco PT DPT [id number]     Treatment Charges: Mins Units   Initial Evaluation     Re-Evaluation     Ther Exercise         TE     Manual Therapy     MT     Ther Activities        TA 15 1   Gait Training          GT     Neuro Re-education NR 25 2   Modalities     Non-Billable Service Time     Other     Total Time/Units 40 3

## 2021-09-20 ENCOUNTER — TREATMENT (OUTPATIENT)
Dept: PHYSICAL THERAPY | Age: 76
End: 2021-09-20
Payer: COMMERCIAL

## 2021-09-20 DIAGNOSIS — Z96.641 STATUS POST TOTAL HIP REPLACEMENT, RIGHT: Primary | ICD-10-CM

## 2021-09-20 PROCEDURE — 97112 NEUROMUSCULAR REEDUCATION: CPT

## 2021-09-20 PROCEDURE — 97530 THERAPEUTIC ACTIVITIES: CPT

## 2021-09-20 NOTE — PROGRESS NOTES
[address] [facility]   Phone: [phone]   Fax: [phone]      Physical Therapy Daily Treatment Note    Date: 2021  Patient Name: Laura Borrego  : 1945   MRN: [id number]  DOInjury: years   DOSx:   Referring Provider: David Mcqueen MD  [address] [place] [address]     Medical Diagnosis:     Difficulty Walking. Gait Disturbance. Outcome Measure:     S: Pt reports she is feeling good this day. O:   Time 340-420     Visit  Repeat outcome measure at mid point and end. Pain 4/10                       Modalities            Manual            Stretch      IT band supine      HS supine      Quad Prone      Exercise      Bike      Ball squeeze TE   Blue band TE   Blue band TE   Blue band TE   LAQ 3x 10 R & L 2 lb NR               Verbal commands for safety, sequencing, & posture TA   Blue band TE   2lb cuff weights TE   Step-ups - FWD x20 B 2 lb  TA   Step-ups - LAT x20 B 2 lb TA         Standing SLR 3 direction x30 B 2 lb NR   YTB TA   Standing hs curl x20 B YTB NR   Mini squats x30   TA   Gait training 4 min  TA   Standing hs curl  x30 B  2lb  TA   Standing HR  x30 B 2lb  TA   STS x30  TA         A:  Tolerated well. Continued to progress pt c CKC activity. She reports moderate fatigue but denies pain. Educated pt on weaning from the walker c good understanding noted. P: Continue with rehab plan & progress to include standing exercises band TKE when appropriate.   Irene Martinez PTA [id number]     Treatment Charges: Mins Units   Initial Evaluation     Re-Evaluation     Ther Exercise         TE     Manual Therapy     MT     Ther Activities        TA 25 2   Gait Training          GT     Neuro Re-education NR 12 1   Modalities     Non-Billable Service Time     Other     Total Time/Units 40 3

## 2021-09-22 ENCOUNTER — TREATMENT (OUTPATIENT)
Dept: PHYSICAL THERAPY | Age: 76
End: 2021-09-22
Payer: COMMERCIAL

## 2021-09-22 DIAGNOSIS — Z96.641 STATUS POST TOTAL HIP REPLACEMENT, RIGHT: Primary | ICD-10-CM

## 2021-09-22 PROCEDURE — 97112 NEUROMUSCULAR REEDUCATION: CPT | Performed by: PHYSICAL THERAPIST

## 2021-09-22 PROCEDURE — 97530 THERAPEUTIC ACTIVITIES: CPT | Performed by: PHYSICAL THERAPIST

## 2021-09-22 NOTE — PROGRESS NOTES
[address] [facility]   Phone: [phone]   Fax: [phone]      Physical Therapy Daily Treatment Note    Date: 2021  Patient Name: Daylin Gay  : 1945   MRN: [id number]  DOInjury: years   DOSx:   Referring Provider: Yemi Russell MD  [address] [place] [address]     Medical Diagnosis:     Difficulty Walking. Gait Disturbance. Outcome Measure:     S: Pt reports she has been utilizing SC at home. O:   Time 144-224     Visit  Repeat outcome measure at mid point and end. Pain 4/10                       Modalities            Manual            Stretch      IT band supine      HS supine      Quad Prone      Exercise      Bike      Ball squeeze TE   Blue band TE   Blue band TE   Blue band TE   LAQ 3x 10 R & L 3 lb NR               Verbal commands for safety, sequencing, & posture TA   Blue band TE   2lb cuff weights TE   Step-ups - FWD x20 B 6 inch TA   Step-ups - LAT x20 B 6 inch TA         Standing SLR 3 direction x30 B 3 lb NR   YTB TA   Standing hs curl x20 B 3 lb NR    TA    TA   Standing hs curl  x30 B  3lb  TA   Standing HR  x30 B 3lb  TA   STS x30 1 foam TA         A:  Tolerated well. She was progressed today c 3lb ankle weights to further improve strength and stability. She is significantly limited by L knee instability and ROM restriction, limiting functional mobility. P: Continue with rehab plan & progress to include standing exercises band TKE when appropriate.   Charmaine Noriega PT DPT [id number]     Treatment Charges: Mins Units   Initial Evaluation     Re-Evaluation     Ther Exercise         TE     Manual Therapy     MT     Ther Activities        TA 25 2   Gait Training          GT     Neuro Re-education NR 15 1   Modalities     Non-Billable Service Time     Other     Total Time/Units 40 3

## 2021-09-23 ENCOUNTER — OFFICE VISIT (OUTPATIENT)
Dept: ORTHOPEDIC SURGERY | Age: 76
End: 2021-09-23

## 2021-09-23 VITALS — HEIGHT: 62 IN | BODY MASS INDEX: 28.89 KG/M2 | WEIGHT: 157 LBS

## 2021-09-23 DIAGNOSIS — Z96.641 STATUS POST TOTAL HIP REPLACEMENT, RIGHT: Primary | ICD-10-CM

## 2021-09-23 PROCEDURE — 99024 POSTOP FOLLOW-UP VISIT: CPT | Performed by: ORTHOPAEDIC SURGERY

## 2021-09-23 NOTE — PROGRESS NOTES
Chief Complaint:   Chief Complaint   Patient presents with    Post-Op Check     Five weeks out right total hip arthroplasty. States she is doing good. HPI 5 weeks postop right total hip arthroplasty 8/17/2021. Patient has good relief of her right hip joint pain. Continues to use a walker but has been doing some walking with a cane and PT. Do note she has history of knee and left hip osteoarthritis as well as lumbar disease. Patient Active Problem List   Diagnosis    Primary osteoarthritis of right hip    Osteoarthritis of right hip       Past Medical History:   Diagnosis Date    Hypertension        Past Surgical History:   Procedure Laterality Date    ROTATOR CUFF REPAIR Bilateral     TOTAL HIP ARTHROPLASTY Right 8/17/2021    HIP TOTAL ARTHROPLASTY----KANCHAN performed by Abril Jauregui MD at [facility]       Current Outpatient Medications   Medication Sig Dispense Refill    acetaminophen (TYLENOL) 500 MG tablet Take 1,000 mg by mouth every 6 hours as needed for Pain      oxybutynin (DITROPAN) 5 MG tablet Take 5 mg by mouth daily      ammonium lactate (AMLACTIN) 12 % cream Apply topically as needed for Dry Skin       amLODIPine-benazepril (LOTREL) 5-20 MG per capsule Take 1 capsule by mouth daily.  ibuprofen (ADVIL;MOTRIN) 800 MG tablet Take 1 tablet by mouth every 8 hours as needed        No current facility-administered medications for this visit. Allergies   Allergen Reactions    Darvocet [Propoxyphene N-Acetaminophen]     Darvon [Propoxyphene Hcl]        Social History     Socioeconomic History    Marital status:       Spouse name: None    Number of children: None    Years of education: None    Highest education level: None   Occupational History    None   Tobacco Use    Smoking status: Never Smoker    Smokeless tobacco: Never Used   Vaping Use    Vaping Use: Never used   Substance and Sexual Activity    Alcohol use: Yes     Comment: social    Drug use: Never    Sexual activity: None   Other Topics Concern    None   Social History Narrative    None     Social Determinants of Health     Financial Resource Strain:     Difficulty of Paying Living Expenses:    Food Insecurity:     Worried About Running Out of Food in the Last Year:     [address] in the Last Year:    Transportation Needs:     Lack of Transportation (Medical):  Lack of Transportation (Non-Medical):    Physical Activity:     Days of Exercise per Week:     Minutes of Exercise per Session:    Stress:     Feeling of Stress :    Social Connections:     Frequency of Communication with Friends and Family:     Frequency of Social Gatherings with Friends and Family:     Attends [employer] Services:     Active Member of Clubs or Organizations:     Attends Club or Organization Meetings:     Marital Status:    Intimate Partner Violence:     Fear of Current or Ex-Partner:     Emotionally Abused:     Physically Abused:     Sexually Abused:        No family history on file. Review of Systems   No fever, chills, or other constitutionalsymptoms. No numbness or other neuro symptoms. No chest pain. No dyspnea. [unfilled]   No acute distress. Able to stand independently walk full weightbearing with a walker and full weightbearing with 1 hand assist although slightly unsteady globally. Right hip incision healed cleanly without erythema tenderness or palpable fluid collection. There is some stiffness on right hip rotation but no pain as she had before. Left hip is stiff and mildly painful. Degenerative changes noted both knees. Physical Exam    Patient is alert and oriented. Well-developed well-nourished. Pupils equal and reactive. Scleraeanicteric. Neck supple  Lungs clear. Cardiac rate and rhythm regular. Abdomen soft and nontender. Skin warm and dry. XRAY: Deferred today due to equipment malfunction.   Prior x-rays demonstrated good alignment of right total

## 2021-09-27 ENCOUNTER — TREATMENT (OUTPATIENT)
Dept: PHYSICAL THERAPY | Age: 76
End: 2021-09-27
Payer: COMMERCIAL

## 2021-09-27 DIAGNOSIS — Z96.641 STATUS POST TOTAL HIP REPLACEMENT, RIGHT: Primary | ICD-10-CM

## 2021-09-27 PROCEDURE — 97112 NEUROMUSCULAR REEDUCATION: CPT | Performed by: PHYSICAL THERAPIST

## 2021-09-27 PROCEDURE — 97530 THERAPEUTIC ACTIVITIES: CPT | Performed by: PHYSICAL THERAPIST

## 2021-10-06 ENCOUNTER — TREATMENT (OUTPATIENT)
Dept: PHYSICAL THERAPY | Age: 76
End: 2021-10-06
Payer: COMMERCIAL

## 2021-10-06 DIAGNOSIS — Z96.641 STATUS POST TOTAL HIP REPLACEMENT, RIGHT: Primary | ICD-10-CM

## 2021-10-06 PROCEDURE — 97530 THERAPEUTIC ACTIVITIES: CPT | Performed by: PHYSICAL THERAPIST

## 2021-10-06 PROCEDURE — 97112 NEUROMUSCULAR REEDUCATION: CPT | Performed by: PHYSICAL THERAPIST

## 2021-10-06 PROCEDURE — 97110 THERAPEUTIC EXERCISES: CPT | Performed by: PHYSICAL THERAPIST

## 2021-10-06 NOTE — PROGRESS NOTES
[address] [facility]   Phone: [phone]   Fax: [phone]      Physical Therapy Daily Treatment Note    Date: 2021  Patient Name: Arlen Benedict  : 1945   MRN: [id number]  DOInjury: years   DOSx:   Referring Provider: Fili Gautam MD  [address] [place] [address]     Medical Diagnosis:     Difficulty Walking. Gait Disturbance. Outcome Measure:     S: Pt reports she has incr in pain in L knee and little to no pain in R hip today. O:   Time 220-300     Visit 8 Repeat outcome measure at mid point and end. Pain 4/10                       Modalities            Manual            Stretch      IT band supine      HS supine      Quad Prone      Exercise      Bike      Seated hip add  3x 10 Ball squeeze TE   Seated march 3x 10 Blue band TE   Seated clams 3x 10 Blue band TE   Seated leg curl 3x 10 R & L Blue band TE   LAQ 3x 10 R & L PTB NR               Verbal commands for safety, sequencing, & posture TA   Blue band TE   2lb cuff weights TE   6 inch TA   6 inch TA         Standing SLR 3 direction x30 B 3 lb NR   Resisted step x30 B YTB TA   Standing hs curl x20 B 3 lb NR   Mini squats x30   TA       Standing hs curl  x30 B  3lb  TA   Standing HR  x30 B 3lb  TA   1 foam TA         A:  Tolerated well. Pt was tx c functional stability today to further improve gait. She tolerated the session well c moderate fatigue and no incr in pain. She is most limited at this time c L knee. P: Continue with rehab plan & progress to include standing exercises band TKE when appropriate.   Nikki Bowers PT DPT [id number]     Treatment Charges: Mins Units   Initial Evaluation     Re-Evaluation     Ther Exercise         TE 15 1   Manual Therapy     MT     Ther Activities        TA 10 1   Gait Training          GT     Neuro Re-education NR 15 1   Modalities     Non-Billable Service Time     Other     Total Time/Units 40 3

## 2021-10-11 ENCOUNTER — TREATMENT (OUTPATIENT)
Dept: PHYSICAL THERAPY | Age: 76
End: 2021-10-11
Payer: COMMERCIAL

## 2021-10-11 DIAGNOSIS — Z96.641 STATUS POST TOTAL HIP REPLACEMENT, RIGHT: Primary | ICD-10-CM

## 2021-10-11 PROCEDURE — 97530 THERAPEUTIC ACTIVITIES: CPT

## 2021-10-11 PROCEDURE — 97112 NEUROMUSCULAR REEDUCATION: CPT

## 2021-10-11 PROCEDURE — 97110 THERAPEUTIC EXERCISES: CPT

## 2021-10-11 NOTE — PROGRESS NOTES
[address] [facility]   Phone: [phone]   Fax: [phone]      Physical Therapy Daily Treatment Note    Date: 2021  Patient Name: Marline Aj  : 1945   MRN: [id number]  DOInjury: years   DOSx:   Referring Provider: Keanu Johnston MD  [address] [place] [address]     Medical Diagnosis:     Difficulty Walking. Gait Disturbance. Outcome Measure:     S: Pt reports her L knee is bothering pt today. O:   Time 140-220     Visit 9 Repeat outcome measure at mid point and end. Pain 4/10                       Modalities            Manual            Stretch      IT band supine      HS supine      Quad Prone      Exercise      Bike      Seated hip add  3x 10 Ball squeeze TE   Seated march 3x 10 Purple band TE   Seated clams 3x 10 Purple band TE   Seated leg curl 3x 10 R & L Purple band TE   LAQ 3x 10 R & L PTB NR               Verbal commands for safety, sequencing, & posture TA   Blue band TE   2lb cuff weights TE   6 inch TA   6 inch TA         Standing SLR 3 direction x30 B 3 lb NR   Resisted step x30 B YTB TA   Standing hs curl x20 B 3 lb NR   Mini squats x30   TA       Standing hs curl  x30 B  3lb  TA   Standing HR  x30 B 3lb  TA   STS x30 1 foam TA         A:  Tolerated well. Patient has difficulty a L knee d/t pain. Continue c rehab plan. P: Continue with rehab plan & progress to include standing exercises band TKE when appropriate.   Briseida Adrian PTA [id number]      Treatment Charges: Mins Units   Initial Evaluation     Re-Evaluation     Ther Exercise         TE 15 1   Manual Therapy     MT     Ther Activities        TA 10 1   Gait Training          GT     Neuro Re-education NR 15 1   Modalities     Non-Billable Service Time     Other     Total Time/Units 40 3

## 2021-10-13 ENCOUNTER — TREATMENT (OUTPATIENT)
Dept: PHYSICAL THERAPY | Age: 76
End: 2021-10-13
Payer: COMMERCIAL

## 2021-10-13 DIAGNOSIS — Z96.641 STATUS POST TOTAL HIP REPLACEMENT, RIGHT: Primary | ICD-10-CM

## 2021-10-13 PROCEDURE — 97112 NEUROMUSCULAR REEDUCATION: CPT | Performed by: PHYSICAL THERAPIST

## 2021-10-13 PROCEDURE — 97530 THERAPEUTIC ACTIVITIES: CPT | Performed by: PHYSICAL THERAPIST

## 2021-10-13 NOTE — PROGRESS NOTES
[address] [facility]   Phone: [phone]   Fax: [phone]      Physical Therapy Daily Treatment Note    Date: 2021  Patient Name: Nahun Alicea  : 1945   MRN: [id number]  DOInjury: years   DOSx:   Referring Provider: Regina Porras MD  [address] [place] [address]     Medical Diagnosis:     Difficulty Walking. Gait Disturbance. Outcome Measure:     S: Pt reports less pain in the L knee d/t utilizing topical. She is ambulation full time c SC.     O:   Time 225-3005     Visit 10 Repeat outcome measure at mid point and end. Pain 4/10                       Modalities            Manual            Stretch      IT band supine      HS supine      Quad Prone      Exercise      Bike      Ball squeeze TE   Purple band TE   Purple band TE   Purple band TE   LAQ 3x 10 R & L 3 lb NR               Verbal commands for safety, sequencing, & posture TA   Blue band TE   2lb cuff weights TE   Step-ups - FWD x20  4 inch R TA   Step-ups - LAT x20  Up/over foam NR         Standing SLR 3 direction x30 B 3 lb NR   Resisted step x30 B YTB TA   Standing hs curl x20 B 3 lb NR   Mini squats x30   TA       Standing hs curl  x30 B  3lb  TA   Standing HR  x30 B 3lb  TA   STS x30 1 foam TA         A:  Tolerated well. Pt progressed c functional mobility and tolerated the session well c moderate fatigue and no pain. P: Continue with rehab plan & progress to include standing exercises band TKE when appropriate.   Chinmay Moe PT DPT [id number]     Treatment Charges: Mins Units   Initial Evaluation     Re-Evaluation     Ther Exercise         TE     Manual Therapy     MT     Ther Activities        TA 25 2   Gait Training          GT     Neuro Re-education NR 15 1   Modalities     Non-Billable Service Time     Other     Total Time/Units 40 3

## 2021-10-20 ENCOUNTER — TREATMENT (OUTPATIENT)
Dept: PHYSICAL THERAPY | Age: 76
End: 2021-10-20
Payer: COMMERCIAL

## 2021-10-20 DIAGNOSIS — Z96.641 STATUS POST TOTAL HIP REPLACEMENT, RIGHT: Primary | ICD-10-CM

## 2021-10-20 PROCEDURE — 97164 PT RE-EVAL EST PLAN CARE: CPT | Performed by: PHYSICAL THERAPIST

## 2021-10-20 PROCEDURE — 97530 THERAPEUTIC ACTIVITIES: CPT | Performed by: PHYSICAL THERAPIST

## 2021-10-20 PROCEDURE — 97112 NEUROMUSCULAR REEDUCATION: CPT | Performed by: PHYSICAL THERAPIST

## 2021-10-20 NOTE — PROGRESS NOTES
Gait: antalgic gait (secondary to L knee pain)     Functional Strength:   Able to toe walk, heel walk, and squat.     Range of Motion:     oint/Motion:     Hip:  Right:   AROM: 75° Flexion,   35° Abduction,  30° ER, 10° IR     Left:   AROM: 80° Flexion,   40° Abduction,  35° ER, 20° IR        Knee:  Right:   AROM:  WFL     Left:   AROM: WFL              Strength:     Trunk:  mildly decreased  Hip:  Right: Flexion 4+/5,  Extension 4+/5, Abduction 4+/5, ER 4+/5, IR 4+/5    Left: Flexion 4+/5,  Extension 4+/5, Abduction 4+/5, ER 4+/5, IR 4+/5      Knee:   Right: Flexion 4+/5,  Extension 4+/5  Left: Flexion 4/5,  Extension 4/5      Palpation: no tenderness       Sensation: intact to light touch and temperature.        Special Tests: NT d/t post op     Comments: restricted motion in B knees    OUTCOME MEASURE: LEFS 42% limitation    GOALS:     Long Term goals (4-6 weeks)  · Decrease reported pain to 0/10  · Increase ROM to 90° Flexion,   45° Abduction,  35° ER, 20° IR  · Increase Strength to 4+/5 globally   · Able to perform/complete the following functions/tasks: Pt will ambulate for 10 min s AD or limitation, able to negotiate a flight of stairs recip s pain, limitation, or HR, able to perform 10 STS transfers s pain or limitation or HHA  ·    · LEFS 40-60/80  · Independent with Home Exercise Programs        REASON FOR DISCHARGE: Pt has progressed well over their PT POC. Pt is reporting considerable improvements in ambulation and endurace, but notes some difficulty c ambulating and stairs secondary to L knee pain. Objectively, they have made considerable improvements in regards to ROM, strength, stability, and mobility. Most STG and LTG have been met at this time and pt no longer requires skilled care. They will be d/c from PT services at this time to I HEP.       PATIENT EDUCATION/INSTRUCTIONS: continue HEP as instructed    RECOMMENDATIONS: call c questions or if additional services are warranted. Thank you for the opportunity to work with your patient. If you have questions or comments, please contact me at numbers listed above.       Ben Azar 94 , DPT PT [id number] 10/20/2021

## 2021-10-20 NOTE — PROGRESS NOTES
[address] [facility]   Phone: [phone]   Fax: [phone]      Physical Therapy Daily Treatment Note    Date: 2021  Patient Name: Sandra Silva  : 1945   MRN: [id number]  DOInjury: years   DOSx:   Referring Provider: Debora Solis MD  [address] [place] [address]     Medical Diagnosis:     Difficulty Walking. Gait Disturbance. Outcome Measure:     S: Pt reports less pain in the L knee d/t utilizing topical. She is ambulation full time c SC.     O:   Time 225-3005     Visit 10 Repeat outcome measure at mid point and end. Pain 4/10                       Modalities            Manual            Stretch      IT band supine      HS supine      Quad Prone      Exercise      Bike      Ball squeeze TE   Purple band TE   Purple band TE   Purple band TE   LAQ 3x 10 R & L YTB NR               Verbal commands for safety, sequencing, & posture TA   Blue band TE   2lb cuff weights TE   4 inch R TA   Up/over foam NR         Standing SLR 3 direction x30 B 3 lb NR   Resisted step x30 B YTB TA   Standing hs curl x20 B 3 lb NR   Mini squats x30   TA       Standing hs curl  x30 B  3lb  TA   Standing HR  x30 B 3lb  TA   STS x30 1 foam TA         A:  Tolerated well. Pt was reevaluated today and was found to no longer require skilled care (see note for details). Pts HEP was reviewed c good understanding and will d/c from PT services are this time. P: Continue with rehab plan & progress to include standing exercises band TKE when appropriate.   Dora Cox PT DPT [id number]     Treatment Charges: Mins Units   Initial Evaluation     Re-Evaluation 10 1   Ther Exercise         TE     Manual Therapy     MT     Ther Activities        TA 15 1   Gait Training          GT     Neuro Re-education NR 15 1   Modalities     Non-Billable Service Time     Other     Total Time/Units 40 3

## 2021-11-15 ENCOUNTER — OFFICE VISIT (OUTPATIENT)
Dept: ORTHOPEDIC SURGERY | Age: 76
End: 2021-11-15

## 2021-11-15 VITALS — BODY MASS INDEX: 28.89 KG/M2 | HEIGHT: 62 IN | WEIGHT: 157 LBS

## 2021-11-15 DIAGNOSIS — M16.12 PRIMARY OSTEOARTHRITIS OF LEFT HIP: ICD-10-CM

## 2021-11-15 DIAGNOSIS — M17.12 PRIMARY OSTEOARTHRITIS OF LEFT KNEE: Primary | ICD-10-CM

## 2021-11-15 DIAGNOSIS — Z96.641 STATUS POST TOTAL HIP REPLACEMENT, RIGHT: ICD-10-CM

## 2021-11-15 PROCEDURE — 99024 POSTOP FOLLOW-UP VISIT: CPT | Performed by: ORTHOPAEDIC SURGERY

## 2021-11-15 NOTE — PROGRESS NOTES
Chief Complaint:   Chief Complaint   Patient presents with     Left knee pain     FU Rt TIP 8/17/2021. Has pain right groin off an on otherwise doing well. HPI 70-year-old woman. Near 3 months after right total hip arthroplasty. Her primary complaint is arthritic left knee pain. Also has some left hip groin pain. She has completed physical therapy ambulating with a straight cane. States her right hip pain is significantly improved and continues to do so. Patient Active Problem List   Diagnosis    Primary osteoarthritis of right hip    Osteoarthritis of right hip       Past Medical History:   Diagnosis Date    Hypertension        Past Surgical History:   Procedure Laterality Date    ROTATOR CUFF REPAIR Bilateral     TOTAL HIP ARTHROPLASTY Right 8/17/2021    HIP TOTAL ARTHROPLASTY----KANCHAN performed by Angus Melgar MD at [facility] OR       Current Outpatient Medications   Medication Sig Dispense Refill    acetaminophen (TYLENOL) 500 MG tablet Take 1,000 mg by mouth every 6 hours as needed for Pain      oxybutynin (DITROPAN) 5 MG tablet Take 5 mg by mouth daily      ammonium lactate (AMLACTIN) 12 % cream Apply topically as needed for Dry Skin       amLODIPine-benazepril (LOTREL) 5-20 MG per capsule Take 1 capsule by mouth daily.  ibuprofen (ADVIL;MOTRIN) 800 MG tablet Take 1 tablet by mouth every 8 hours as needed        No current facility-administered medications for this visit. Allergies   Allergen Reactions    Darvocet [Propoxyphene N-Acetaminophen]     Darvon [Propoxyphene Hcl]        Social History     Socioeconomic History    Marital status:      Spouse name: None    Number of children: None    Years of education: None    Highest education level: None   Occupational History    None   Tobacco Use    Smoking status: Never Smoker    Smokeless tobacco: Never Used   Vaping Use    Vaping Use: Never used   Substance and Sexual Activity    Alcohol use:  Yes Comment: social    Drug use: Never    Sexual activity: None   Other Topics Concern    None   Social History Narrative    None     Social Determinants of Health     Financial Resource Strain:     Difficulty of Paying Living Expenses: Not on file   Food Insecurity:     Worried About Running Out of Food in the Last Year: Not on file    Monserrat of Food in the Last Year: Not on file   Transportation Needs:     Lack of Transportation (Medical): Not on file    Lack of Transportation (Non-Medical): Not on file   Physical Activity:     Days of Exercise per Week: Not on file    Minutes of Exercise per Session: Not on file   Stress:     Feeling of Stress : Not on file   Social Connections:     Frequency of Communication with Friends and Family: Not on file    Frequency of Social Gatherings with Friends and Family: Not on file    Attends Yarsani Services: Not on file    Active Member of [address] [place] [address] [employer] or Organizations: Not on file    Attends Club or Organization Meetings: Not on file    Marital Status: Not on file   Intimate Partner Violence:     Fear of Current or Ex-Partner: Not on file    Emotionally Abused: Not on file    Physically Abused: Not on file    Sexually Abused: Not on file   Housing Stability:     Unable to Pay for Housing in the Last Year: Not on file    Number of [place] in the Last Year: Not on file    Unstable Housing in the Last Year: Not on file       History reviewed. No pertinent family history. Review of Systems   No fever, chills, or other constitutionalsymptoms. No numbness or other neuro symptoms. No chest pain. No dyspnea. [unfilled]   Ambulating with a straight cane. Able to walk with a Trendelenburg gait holding my hands. There is bilateral knee varus more pronounced on the left than the right. Right hip incisions well-healed nontender. No pain with right hip rotation active flexion or abduction.   1 grade weakness on resisted hip abduction on the right. Left hip is significantly stiff unable to rotate past neutral.  Left knee has varus no acute effusion. Physical Exam    Patient is alert and oriented. Well-developed well-nourished. BMI 29. Pupils equal and reactive. Scleraeanicteric. Neck supple  Lungs clear. Cardiac rate and rhythm regular. Abdomen soft and nontender. Skin warm and dry. XRAY: X-ray today AP pelvis with AP and lateral views right hip. Right total hip arthroplasty with good fit and alignment unchanged from prior no lucency subsidence or other adverse change. Stage IV degenerative changes are noted in the left hip joint. Impression: Intact right total hip arthroplasty good alignment. Significant osteoarthritic changes left hip. Earlier x-rays of the left knee reviewed. Severe tricompartmental degenerative changes left knee with varus are noted. ASSESSMENT/PLAN:    Neil Regalado was seen today for follow up after procedure. Diagnoses and all orders for this visit:    Primary osteoarthritis of left knee    Status post total hip replacement, right  -     XR HIP 2-3 VW W PELVIS RIGHT; Future    Primary osteoarthritis of left hip    Findings and imaging reviewed with the patient. Right hip is progressing and will continue to do so for several more months. With respect to her left knee, while it is severely arthritic, I think she has enough arthritis in the left hip that would require addressing the left hip before the left knee could be considered for successful arthroplasty. She will see PT today to  home exercise instructions. Return in about 3 months (around 2/15/2022) for exam and xray B hip/knee.      Depending on status at that time we will decide whether to proceed with left hip or knee arthroplasty  Kodak Santana MD    11/15/2021  11:13 AM

## 2021-11-28 ENCOUNTER — HOSPITAL ENCOUNTER (EMERGENCY)
Age: 76
Discharge: HOME | End: 2021-11-28
Attending: EMERGENCY MEDICINE
Payer: COMMERCIAL

## 2021-11-28 ENCOUNTER — APPOINTMENT (OUTPATIENT)
Dept: GENERAL RADIOLOGY | Age: 76
End: 2021-11-28
Payer: COMMERCIAL

## 2021-11-28 VITALS
TEMPERATURE: 99 F | WEIGHT: 160 LBS | DIASTOLIC BLOOD PRESSURE: 85 MMHG | HEART RATE: 78 BPM | RESPIRATION RATE: 16 BRPM | SYSTOLIC BLOOD PRESSURE: 162 MMHG | OXYGEN SATURATION: 98 % | BODY MASS INDEX: 29.26 KG/M2

## 2021-11-28 DIAGNOSIS — K29.00 ACUTE GASTRITIS, PRESENCE OF BLEEDING UNSPECIFIED, UNSPECIFIED GASTRITIS TYPE: ICD-10-CM

## 2021-11-28 DIAGNOSIS — R10.13 EPIGASTRIC PAIN: Primary | ICD-10-CM

## 2021-11-28 LAB
ALBUMIN SERPL-MCNC: 4.1 G/DL (ref 3.5–5.2)
ALP BLD-CCNC: 122 U/L (ref 35–104)
ALT SERPL-CCNC: 8 U/L (ref 0–32)
ANION GAP SERPL CALCULATED.3IONS-SCNC: 12 MMOL/L (ref 7–16)
AST SERPL-CCNC: 18 U/L (ref 0–31)
BACTERIA: ABNORMAL /HPF
BASOPHILS ABSOLUTE: 0.04 E9/L (ref 0–0.2)
BASOPHILS RELATIVE PERCENT: 0.6 % (ref 0–2)
BILIRUB SERPL-MCNC: 0.7 MG/DL (ref 0–1.2)
BILIRUBIN URINE: NEGATIVE
BLOOD, URINE: NORMAL
BUN BLDV-MCNC: 10 MG/DL (ref 6–23)
CALCIUM SERPL-MCNC: 9.7 MG/DL (ref 8.6–10.2)
CHLORIDE BLD-SCNC: 106 MMOL/L (ref 98–107)
CLARITY: CLEAR
CO2: 22 MMOL/L (ref 22–29)
COLOR: YELLOW
CREAT SERPL-MCNC: 0.7 MG/DL (ref 0.5–1)
EOSINOPHILS ABSOLUTE: 0.02 E9/L (ref 0.05–0.5)
EOSINOPHILS RELATIVE PERCENT: 0.3 % (ref 0–6)
GFR AFRICAN AMERICAN: >60
GFR NON-AFRICAN AMERICAN: >60 ML/MIN/1.73
GLUCOSE BLD-MCNC: 82 MG/DL (ref 74–99)
GLUCOSE URINE: NEGATIVE MG/DL
HCT VFR BLD CALC: 43.6 % (ref 34–48)
HEMOGLOBIN: 13.9 G/DL (ref 11.5–15.5)
IMMATURE GRANULOCYTES #: 0.01 E9/L
IMMATURE GRANULOCYTES %: 0.2 % (ref 0–5)
KETONES, URINE: NEGATIVE MG/DL
LACTIC ACID, SEPSIS: 1.1 MMOL/L (ref 0.5–1.9)
LEUKOCYTE ESTERASE, URINE: NEGATIVE
LIPASE: 32 U/L (ref 13–60)
LYMPHOCYTES ABSOLUTE: 1.9 E9/L (ref 1.5–4)
LYMPHOCYTES RELATIVE PERCENT: 30.5 % (ref 20–42)
MCH RBC QN AUTO: 28.4 PG (ref 26–35)
MCHC RBC AUTO-ENTMCNC: 31.9 % (ref 32–34.5)
MCV RBC AUTO: 89.2 FL (ref 80–99.9)
MONOCYTES ABSOLUTE: 0.47 E9/L (ref 0.1–0.95)
MONOCYTES RELATIVE PERCENT: 7.6 % (ref 2–12)
NEUTROPHILS ABSOLUTE: 3.78 E9/L (ref 1.8–7.3)
NEUTROPHILS RELATIVE PERCENT: 60.8 % (ref 43–80)
NITRITE, URINE: NEGATIVE
PDW BLD-RTO: 13 FL (ref 11.5–15)
PH UA: 6 (ref 5–9)
PLATELET # BLD: 252 E9/L (ref 130–450)
PMV BLD AUTO: 9.6 FL (ref 7–12)
POTASSIUM REFLEX MAGNESIUM: 4.1 MMOL/L (ref 3.5–5)
PROTEIN UA: NEGATIVE MG/DL
RBC # BLD: 4.89 E12/L (ref 3.5–5.5)
RBC UA: ABNORMAL /HPF (ref 0–2)
SODIUM BLD-SCNC: 140 MMOL/L (ref 132–146)
SPECIFIC GRAVITY UA: 1.02 (ref 1–1.03)
TOTAL PROTEIN: 7.7 G/DL (ref 6.4–8.3)
TROPONIN, HIGH SENSITIVITY: 10 NG/L (ref 0–9)
UROBILINOGEN, URINE: 0.2 E.U./DL
WBC # BLD: 6.2 E9/L (ref 4.5–11.5)
WBC UA: ABNORMAL /HPF (ref 0–5)

## 2021-11-28 PROCEDURE — 36415 COLL VENOUS BLD VENIPUNCTURE: CPT

## 2021-11-28 PROCEDURE — 83690 ASSAY OF LIPASE: CPT

## 2021-11-28 PROCEDURE — 81001 URINALYSIS AUTO W/SCOPE: CPT

## 2021-11-28 PROCEDURE — 71045 X-RAY EXAM CHEST 1 VIEW: CPT

## 2021-11-28 PROCEDURE — 99284 EMERGENCY DEPT VISIT MOD MDM: CPT

## 2021-11-28 PROCEDURE — 84484 ASSAY OF TROPONIN QUANT: CPT

## 2021-11-28 PROCEDURE — 83605 ASSAY OF LACTIC ACID: CPT

## 2021-11-28 PROCEDURE — 85025 COMPLETE CBC W/AUTO DIFF WBC: CPT

## 2021-11-28 PROCEDURE — 80053 COMPREHEN METABOLIC PANEL: CPT

## 2021-11-28 PROCEDURE — 6370000000 HC RX 637 (ALT 250 FOR IP): Performed by: EMERGENCY MEDICINE

## 2021-11-28 PROCEDURE — 93005 ELECTROCARDIOGRAM TRACING: CPT | Performed by: EMERGENCY MEDICINE

## 2021-11-28 RX ORDER — SUCRALFATE 1 G/1
1 TABLET ORAL 4 TIMES DAILY
Qty: 120 TABLET | Refills: 3 | Status: SHIPPED | OUTPATIENT
Start: 2021-11-28 | End: 2022-03-06

## 2021-11-28 RX ORDER — MAGNESIUM HYDROXIDE/ALUMINUM HYDROXICE/SIMETHICONE 120; 1200; 1200 MG/30ML; MG/30ML; MG/30ML
SUSPENSION ORAL
Status: DISCONTINUED
Start: 2021-11-28 | End: 2021-11-28 | Stop reason: HOSPADM

## 2021-11-28 RX ADMIN — MAGNESIUM HYDROXIDE/ALUMINUM HYDROXICE/SIMETHICONE: 120; 1200; 1200 SUSPENSION ORAL at 16:08

## 2021-11-28 ASSESSMENT — ENCOUNTER SYMPTOMS
EYE REDNESS: 0
ABDOMINAL PAIN: 1
VOMITING: 0
NAUSEA: 0
SHORTNESS OF BREATH: 0

## 2021-11-28 NOTE — ED PROVIDER NOTES
Chief complaint: Abdominal pain      HPI:  11/28/21, Time: 3:28 PM EST    YEIMI Cespedes is a 67 y.o. female presenting to the ED for abdominal pain. History is obtained from the patient as well as the patient's medical record. Patient is presenting emergency department the chief complaint of abdominal pain. The patient states that she began last night with abdominal pain. The pain is described as cramping. Nothing makes better. Nothing makes it worse. Denies any pain at the current time. She states that she did have some lightheadedness. States this is worse with standing. She denies any fevers, chills, chest pain, shortness of breath, dysuria, diarrhea or constipation. Patient reports that she did follow with her PCP recently who started on Pepcid secondary to her taking significant amount of ibuprofen. ROS:   Review of Systems   Constitutional: Negative for chills and fatigue. HENT: Negative for congestion. Eyes: Negative for redness. Respiratory: Negative for shortness of breath. Cardiovascular: Negative for chest pain. Gastrointestinal: Positive for abdominal pain. Negative for nausea and vomiting. Genitourinary: Negative for dysuria. Musculoskeletal: Negative for arthralgias. Skin: Negative for rash. Neurological: Positive for dizziness and light-headedness. Psychiatric/Behavioral: Negative for confusion. All other systems reviewed and are negative.      --------------------------------------------- PAST HISTORY ---------------------------------------------  Past Medical History:  has a past medical history of Hypertension. Past Surgical History:  has a past surgical history that includes Rotator cuff repair (Bilateral) and Total hip arthroplasty (Right, 8/17/2021). Social History:  reports that she has never smoked. She has never used smokeless tobacco. She reports current alcohol use. She reports that she does not use drugs.     Family History: family history is not on file. The patients home medications have been reviewed. Allergies: Darvocet [propoxyphene n-acetaminophen] and Darvon [propoxyphene hcl]    ---------------------------------------------------PHYSICAL EXAM--------------------------------------  Constitutional/General: Alert and oriented x3, well appearing, non toxic in NAD  Head: Normocephalic and atraumatic  Mouth: Oropharynx clear, handling secretions, no trismus  Neck: Supple, full ROM,  Pulmonary: Lungs clear to auscultation bilaterally, no wheezes, rales, or rhonchi. Not in respiratory distress  Cardiovascular:  Regular rate. Regular rhythm. No murmurs  Chest: no chest wall tenderness  Abdomen: Soft. Non tender. Non distended. No rebound, guarding, or rigidity. No pulsatile masses appreciated. Musculoskeletal: Moves all extremities x 4. Warm and well perfused, no clubbing, cyanosis, or edema. Capillary refill <3 seconds  Skin: warm and dry. No rashes. Neurologic: GCS 15, no gross focal neurologic deficits  Psych: Normal Affect    -------------------------------------------------- RESULTS -------------------------------------------------  I have personally reviewed all laboratory and imaging results for this patient. Results are listed below.      LABS:  Results for orders placed or performed during the hospital encounter of 11/28/21   CBC Auto Differential   Result Value Ref Range    WBC 6.2 4.5 - 11.5 E9/L    RBC 4.89 3.50 - 5.50 E12/L    Hemoglobin 13.9 11.5 - 15.5 g/dL    Hematocrit 43.6 34.0 - 48.0 %    MCV 89.2 80.0 - 99.9 fL    MCH 28.4 26.0 - 35.0 pg    MCHC 31.9 (L) 32.0 - 34.5 %    RDW 13.0 11.5 - 15.0 fL    Platelets 000 825 - 256 E9/L    MPV 9.6 7.0 - 12.0 fL    Neutrophils % 60.8 43.0 - 80.0 %    Immature Granulocytes % 0.2 0.0 - 5.0 %    Lymphocytes % 30.5 20.0 - 42.0 %    Monocytes % 7.6 2.0 - 12.0 %    Eosinophils % 0.3 0.0 - 6.0 %    Basophils % 0.6 0.0 - 2.0 %    Neutrophils Absolute 3.78 1.80 - 7.30 E9/L Immature Granulocytes # 0.01 E9/L    Lymphocytes Absolute 1.90 1.50 - 4.00 E9/L    Monocytes Absolute 0.47 0.10 - 0.95 E9/L    Eosinophils Absolute 0.02 (L) 0.05 - 0.50 E9/L    Basophils Absolute 0.04 0.00 - 0.20 E9/L   Comprehensive Metabolic Panel w/ Reflex to MG   Result Value Ref Range    Sodium 140 132 - 146 mmol/L    Potassium reflex Magnesium 4.1 3.5 - 5.0 mmol/L    Chloride 106 98 - 107 mmol/L    CO2 22 22 - 29 mmol/L    Anion Gap 12 7 - 16 mmol/L    Glucose 82 74 - 99 mg/dL    BUN 10 6 - 23 mg/dL    CREATININE 0.7 0.5 - 1.0 mg/dL    GFR Non-African American >60 >=60 mL/min/1.73    GFR African American >60     Calcium 9.7 8.6 - 10.2 mg/dL    Total Protein 7.7 6.4 - 8.3 g/dL    Albumin 4.1 3.5 - 5.2 g/dL    Total Bilirubin 0.7 0.0 - 1.2 mg/dL    Alkaline Phosphatase 122 (H) 35 - 104 U/L    ALT 8 0 - 32 U/L    AST 18 0 - 31 U/L   Lipase   Result Value Ref Range    Lipase 32 13 - 60 U/L   Lactate, Sepsis   Result Value Ref Range    Lactic Acid, Sepsis 1.1 0.5 - 1.9 mmol/L   Urinalysis, reflex to microscopic   Result Value Ref Range    Color, UA Yellow Straw/Yellow    Clarity, UA Clear Clear    Glucose, Ur Negative Negative mg/dL    Bilirubin Urine Negative Negative    Ketones, Urine Negative Negative mg/dL    Specific Gravity, UA 1.025 1.005 - 1.030    Blood, Urine TRACE-INTACT Negative    pH, UA 6.0 5.0 - 9.0    Protein, UA Negative Negative mg/dL    Urobilinogen, Urine 0.2 <2.0 E.U./dL    Nitrite, Urine Negative Negative    Leukocyte Esterase, Urine Negative Negative   Troponin   Result Value Ref Range    Troponin, High Sensitivity 10 (H) 0 - 9 ng/L   Microscopic Urinalysis   Result Value Ref Range    WBC, UA NONE 0 - 5 /HPF    RBC, UA 0-1 0 - 2 /HPF    Bacteria, UA RARE (A) None Seen /HPF   EKG 12 Lead   Result Value Ref Range    Ventricular Rate 72 BPM    Atrial Rate 72 BPM    P-R Interval 158 ms    QRS Duration 84 ms    Q-T Interval 376 ms    QTc Calculation (Bazett) 411 ms    P Axis 44 degrees    R Axis -18 degrees    T Axis -3 degrees       RADIOLOGY:  Interpreted by Radiologist.  XR CHEST PORTABLE   Final Result   No acute cardiopulmonary disease.                 ------------------------- NURSING NOTES AND VITALS REVIEWED ---------------------------   The nursing notes within the ED encounter and vital signs as below have been reviewed by myself. BP (!) 162/85   Pulse 78   Temp 99 °F (37.2 °C) (Oral)   Resp 16   Wt 160 lb (72.6 kg)   SpO2 98%   BMI 29.26 kg/m²   Oxygen Saturation Interpretation: Normal    The patients available past medical records and past encounters were reviewed. ------------------------------ ED COURSE/MEDICAL DECISION MAKING----------------------  Medications   aluminum & magnesium hydroxide-simethicone (MAALOX) 30 mL, lidocaine viscous hcl (XYLOCAINE) 5 mL (GI COCKTAIL) ( Oral Given 11/28/21 1608)             Medical Decision Making:   I, Dr. Sandra Whitaker am the primary physician of record. John Cespedes is a 67 y.o. female who presents to the ED for abdominal pain. Patient did have labs in imaging which were reviewed. The patient did have improvement after GI cocktail. Suspect potential gastritis as patient states she takes a lot of ibuprofen        Re-Evaluations/Consultations:             ED Course as of 11/29/21 1735   Sun Nov 28, 2021   1803 The patient is in the bed no acute distress. Results of today were discussed. Patient states that she did have improvement with the GI cocktail. [MT]      ED Course User Index  [MT] Brynn oMody DO               This patient's ED course included: History, physical examination, reevaluation prior to disposition, labs, imaging, telemetry monitoring, EKG    This patient has remained hemodynamically stable during their ED course. Counseling:    The emergency provider has spoken with the patient and discussed todays results, in addition to providing specific details for the plan of care and counseling regarding the diagnosis and prognosis. Questions are answered at this time and they are agreeable with the plan.       --------------------------------- IMPRESSION AND DISPOSITION ---------------------------------    IMPRESSION  1. Epigastric pain    2. Acute gastritis, presence of bleeding unspecified, unspecified gastritis type        DISPOSITION  Disposition: Discharge to home  Patient condition is stable        NOTE: This report was transcribed using voice recognition software.  Every effort was made to ensure accuracy; however, inadvertent computerized transcription errors may be present         Hemal Maria DO  11/29/21 7338

## 2021-11-29 LAB
EKG ATRIAL RATE: 72 BPM
EKG P AXIS: 44 DEGREES
EKG P-R INTERVAL: 158 MS
EKG Q-T INTERVAL: 376 MS
EKG QRS DURATION: 84 MS
EKG QTC CALCULATION (BAZETT): 411 MS
EKG R AXIS: -18 DEGREES
EKG T AXIS: -3 DEGREES
EKG VENTRICULAR RATE: 72 BPM

## 2021-11-29 PROCEDURE — 93010 ELECTROCARDIOGRAM REPORT: CPT | Performed by: INTERNAL MEDICINE

## 2021-12-21 ENCOUNTER — HOSPITAL ENCOUNTER (OUTPATIENT)
Age: 76
Discharge: HOME | End: 2021-12-23

## 2021-12-21 PROCEDURE — 88342 IMHCHEM/IMCYTCHM 1ST ANTB: CPT

## 2021-12-21 PROCEDURE — 88305 TISSUE EXAM BY PATHOLOGIST: CPT

## 2022-02-23 ENCOUNTER — OFFICE VISIT (OUTPATIENT)
Dept: ORTHOPEDIC SURGERY | Age: 77
End: 2022-02-23
Payer: COMMERCIAL

## 2022-02-23 VITALS — WEIGHT: 162 LBS | HEIGHT: 62 IN | BODY MASS INDEX: 29.81 KG/M2

## 2022-02-23 DIAGNOSIS — M17.12 PRIMARY OSTEOARTHRITIS OF LEFT KNEE: ICD-10-CM

## 2022-02-23 DIAGNOSIS — Z96.641 STATUS POST TOTAL HIP REPLACEMENT, RIGHT: Primary | ICD-10-CM

## 2022-02-23 DIAGNOSIS — M16.12 PRIMARY OSTEOARTHRITIS OF LEFT HIP: ICD-10-CM

## 2022-02-23 PROCEDURE — 99213 OFFICE O/P EST LOW 20 MIN: CPT | Performed by: ORTHOPAEDIC SURGERY

## 2022-02-23 PROCEDURE — G8417 CALC BMI ABV UP PARAM F/U: HCPCS | Performed by: ORTHOPAEDIC SURGERY

## 2022-02-23 PROCEDURE — 1036F TOBACCO NON-USER: CPT | Performed by: ORTHOPAEDIC SURGERY

## 2022-02-23 PROCEDURE — G8399 PT W/DXA RESULTS DOCUMENT: HCPCS | Performed by: ORTHOPAEDIC SURGERY

## 2022-02-23 PROCEDURE — 1123F ACP DISCUSS/DSCN MKR DOCD: CPT | Performed by: ORTHOPAEDIC SURGERY

## 2022-02-23 PROCEDURE — G8427 DOCREV CUR MEDS BY ELIG CLIN: HCPCS | Performed by: ORTHOPAEDIC SURGERY

## 2022-02-23 PROCEDURE — 4040F PNEUMOC VAC/ADMIN/RCVD: CPT | Performed by: ORTHOPAEDIC SURGERY

## 2022-02-23 PROCEDURE — 1090F PRES/ABSN URINE INCON ASSESS: CPT | Performed by: ORTHOPAEDIC SURGERY

## 2022-02-23 PROCEDURE — G8484 FLU IMMUNIZE NO ADMIN: HCPCS | Performed by: ORTHOPAEDIC SURGERY

## 2022-02-23 RX ORDER — HYDROXYZINE PAMOATE 25 MG/1
CAPSULE ORAL
COMMUNITY
Start: 2022-01-14

## 2022-02-23 RX ORDER — FAMOTIDINE 20 MG/1
20 TABLET, FILM COATED ORAL NIGHTLY
COMMUNITY

## 2022-02-23 RX ORDER — CHLORHEXIDINE GLUCONATE 0.12 MG/ML
RINSE ORAL
COMMUNITY
Start: 2022-01-12

## 2022-02-24 NOTE — PROGRESS NOTES
I performed a history and physical exam of the patient, reviewed pertinent imaging, and discussed the patient's management with the resident. I reviewed the resident's note and agree with the documented findings and plan of care. Chief Complaint:   Chief Complaint   Patient presents with    Hip Pain     Left hip pain. FU Rt TIP  8/17/2021 ([facility])    Knee Pain     Bilateral knee pain. Lt > Rt. Maurisio Ramirez presents today for follow-up of her right total hip arthroplasty which was completed on 8/17/2021. She has been doing very well regarding her right hip and has no complaints of pain. She has persistent pain to her knees with the left knee being more involved than the right. She also has left hip pain. No history of falls since her surgery. No changes in sensation in the extremities. She uses a cane for ambulation. No additional complaints at this time. Allergies; medications; past medical, surgical, family, and social history; and problem list have been reviewed today and updated as indicated in this encounter seen below. Exam: Examination of the right lower extremity reveals incision well-healed to the right hip. No erythema, no drainage. Some stiffness with internal rotation but no pain produced with internal or external rotation. Straight leg raise intact to the knee with near full range of motion. No tenderness to palpation of the medial lateral joint lines. Sensation grossly intact to the foot. Pulses intact of the foot. Examination of the left lower extremity reveals varus deformity to the knee. 10 degree extensor lag present. Flexion only to 70 degrees. Minimal to moderate tenderness to palpation at the medial and lateral aspect of the joint line. No varus or valgus instability on exam.  Sensation and pulses intact distally in the foot.     Radiographs: No imaging obtained in clinic today, review of more recent x-rays of the hips and prior x-rays of the left knee show an intact right total hip, end-stage OA of the left hip, and end-stage tricompartmental DJD with varus tibial erosion of the left. Francisca Ralph was seen today for hip pain and knee pain. Diagnoses and all orders for this visit:    Status post total hip replacement, right    Primary osteoarthritis of left knee    Primary osteoarthritis of left hip    Patient is doing well with respect to her recent right total hip arthroplasty. We also discussed in clinic today her findings of left hip and left knee osteoarthritis. Her left knee osteoarthritis is very advanced. She is currently experiencing few symptoms to her left hip and has moderate to severe symptoms to her left knee. She would not like to proceed with any surgical intervention at this time and would like to continue with conservative treatment. Ultimately should be a candidate for total knee arthroplasty for her left knee and total hip arthroplasty for her left hip should they become symptomatic enough for her to desire intervention. She will follow-up as needed. Return if symptoms worsen or fail to improve. Current Outpatient Medications   Medication Sig Dispense Refill    hydrOXYzine (VISTARIL) 25 MG capsule take 1 capsule by mouth three times a day if needed      chlorhexidine (PERIDEX) 0.12 % solution RINSE WITH 1/2 OUNCE BY MOUTH FOR 30 SECONDS THEN SPIT OUT. use twice a day      famotidine (PEPCID) 20 MG tablet Take 20 mg by mouth nightly      sucralfate (CARAFATE) 1 GM tablet Take 1 tablet by mouth 4 times daily 120 tablet 3    acetaminophen (TYLENOL) 500 MG tablet Take 1,000 mg by mouth every 6 hours as needed for Pain      oxybutynin (DITROPAN) 5 MG tablet Take 5 mg by mouth daily      ammonium lactate (AMLACTIN) 12 % cream Apply topically as needed for Dry Skin       amLODIPine-benazepril (LOTREL) 5-20 MG per capsule Take 1 capsule by mouth daily. No current facility-administered medications for this visit.        Patient Active Problem List   Diagnosis    Primary osteoarthritis of right hip    Osteoarthritis of right hip       Past Medical History:   Diagnosis Date    Hypertension        Past Surgical History:   Procedure Laterality Date    ROTATOR CUFF REPAIR Bilateral     TOTAL HIP ARTHROPLASTY Right 8/17/2021    HIP TOTAL ARTHROPLASTY----KANCHAN performed by Duyen Michael MD at [facility] OR       Allergies   Allergen Reactions    Darvocet [Propoxyphene N-Acetaminophen]     Darvon [Propoxyphene Hcl]     Ibuprofen Other (See Comments)     Stomach pain with Ibuprofen       Social History     Socioeconomic History    Marital status:      Spouse name: None    Number of children: None    Years of education: None    Highest education level: None   Occupational History    None   Tobacco Use    Smoking status: Never Smoker    Smokeless tobacco: Never Used   Vaping Use    Vaping Use: Never used   Substance and Sexual Activity    Alcohol use: Yes     Comment: social    Drug use: Never    Sexual activity: None   Other Topics Concern    None   Social History Narrative    None     Social Determinants of Health     Financial Resource Strain:     Difficulty of Paying Living Expenses: Not on file   Food Insecurity:     Worried About Running Out of Food in the Last Year: Not on file    Monserrat of Food in the Last Year: Not on file   Transportation Needs:     Lack of Transportation (Medical): Not on file    Lack of Transportation (Non-Medical):  Not on file   Physical Activity:     Days of Exercise per Week: Not on file    Minutes of Exercise per Session: Not on file   Stress:     Feeling of Stress : Not on file   Social Connections:     Frequency of Communication with Friends and Family: Not on file    Frequency of Social Gatherings with Friends and Family: Not on file    Attends Protestant Services: Not on file    Active Member of Clubs or Organizations: Not on file    Attends Club or Organization Meetings: Not on file    Marital Status: Not on file   Intimate Partner Violence:     Fear of Current or Ex-Partner: Not on file    Emotionally Abused: Not on file    Physically Abused: Not on file    Sexually Abused: Not on file   Housing Stability:     Unable to Pay for Housing in the Last Year: Not on file    Number of [place] in the Last Year: Not on file    Unstable Housing in the Last Year: Not on file       History reviewed. No pertinent family history. Review of Systems  As follows except as previously noted in HPI:  Constitutional: Negative for chills, diaphoresis, fatigue, fever and unexpected weight change. Respiratory: Negative for cough, shortness of breath and wheezing. Cardiovascular: Negative for chest pain and palpitations. Neurological: Negative for dizziness, syncope, cephalgia. GI / : negative  Musculoskeletal: see HPI       Objective:   Physical Exam   Constitutional: Oriented to person, place, and time. and appears well-developed and well-nourished. :   Head: Normocephalic and atraumatic. Eyes: EOM are normal.   Neck: Neck supple. Cardiovascular: Normal rate and regular rhythm. Pulmonary/Chest: Effort normal. No stridor. No respiratory distress, no wheezes. Abdominal:  No abnormal distension. Neurological: Alert and oriented to person, place, and time. Skin: Skin is warm and dry. Psychiatric: Normal mood and affect.  Behavior is normal. Thought content normal.    2/23/2022  7:23 PM

## 2022-03-02 ENCOUNTER — HOSPITAL ENCOUNTER (OUTPATIENT)
Dept: ULTRASOUND IMAGING | Age: 77
Discharge: HOME | End: 2022-03-04
Payer: COMMERCIAL

## 2022-03-02 DIAGNOSIS — R10.9 ABDOMINAL PAIN, UNSPECIFIED ABDOMINAL LOCATION: ICD-10-CM

## 2022-03-02 PROCEDURE — 76705 ECHO EXAM OF ABDOMEN: CPT

## 2022-03-06 ENCOUNTER — APPOINTMENT (OUTPATIENT)
Dept: CT IMAGING | Age: 77
End: 2022-03-06
Payer: COMMERCIAL

## 2022-03-06 ENCOUNTER — HOSPITAL ENCOUNTER (EMERGENCY)
Age: 77
Discharge: HOME | End: 2022-03-06
Attending: EMERGENCY MEDICINE
Payer: COMMERCIAL

## 2022-03-06 ENCOUNTER — APPOINTMENT (OUTPATIENT)
Dept: GENERAL RADIOLOGY | Age: 77
End: 2022-03-06
Payer: COMMERCIAL

## 2022-03-06 VITALS
RESPIRATION RATE: 16 BRPM | HEIGHT: 62 IN | HEART RATE: 80 BPM | TEMPERATURE: 97.7 F | SYSTOLIC BLOOD PRESSURE: 162 MMHG | OXYGEN SATURATION: 97 % | BODY MASS INDEX: 30.18 KG/M2 | WEIGHT: 164 LBS | DIASTOLIC BLOOD PRESSURE: 74 MMHG

## 2022-03-06 DIAGNOSIS — K59.00 CONSTIPATION, UNSPECIFIED CONSTIPATION TYPE: Primary | ICD-10-CM

## 2022-03-06 DIAGNOSIS — R10.84 GENERALIZED ABDOMINAL PAIN: ICD-10-CM

## 2022-03-06 LAB
ALBUMIN SERPL-MCNC: 3.9 G/DL (ref 3.5–5.2)
ALP BLD-CCNC: 134 U/L (ref 35–104)
ALT SERPL-CCNC: 14 U/L (ref 0–32)
ANION GAP SERPL CALCULATED.3IONS-SCNC: 9 MMOL/L (ref 7–16)
AST SERPL-CCNC: 23 U/L (ref 0–31)
BACTERIA: ABNORMAL /HPF
BASOPHILS ABSOLUTE: 0.04 E9/L (ref 0–0.2)
BASOPHILS RELATIVE PERCENT: 0.6 % (ref 0–2)
BILIRUB SERPL-MCNC: 0.3 MG/DL (ref 0–1.2)
BILIRUBIN DIRECT: <0.2 MG/DL (ref 0–0.3)
BILIRUBIN URINE: NEGATIVE
BILIRUBIN, INDIRECT: ABNORMAL MG/DL (ref 0–1)
BLOOD, URINE: ABNORMAL
BUN BLDV-MCNC: 18 MG/DL (ref 6–23)
CALCIUM SERPL-MCNC: 9.6 MG/DL (ref 8.6–10.2)
CHLORIDE BLD-SCNC: 106 MMOL/L (ref 98–107)
CLARITY: CLEAR
CO2: 22 MMOL/L (ref 22–29)
COLOR: YELLOW
CREAT SERPL-MCNC: 0.6 MG/DL (ref 0.5–1)
EKG ATRIAL RATE: 73 BPM
EKG P AXIS: 50 DEGREES
EKG P-R INTERVAL: 160 MS
EKG Q-T INTERVAL: 394 MS
EKG QRS DURATION: 86 MS
EKG QTC CALCULATION (BAZETT): 434 MS
EKG R AXIS: -14 DEGREES
EKG T AXIS: 5 DEGREES
EKG VENTRICULAR RATE: 73 BPM
EOSINOPHILS ABSOLUTE: 0.04 E9/L (ref 0.05–0.5)
EOSINOPHILS RELATIVE PERCENT: 0.6 % (ref 0–6)
GFR AFRICAN AMERICAN: >60
GFR NON-AFRICAN AMERICAN: >60 ML/MIN/1.73
GLUCOSE BLD-MCNC: 99 MG/DL (ref 74–99)
GLUCOSE URINE: NEGATIVE MG/DL
HCT VFR BLD CALC: 42.3 % (ref 34–48)
HEMOGLOBIN: 13.1 G/DL (ref 11.5–15.5)
IMMATURE GRANULOCYTES #: 0.02 E9/L
IMMATURE GRANULOCYTES %: 0.3 % (ref 0–5)
KETONES, URINE: NEGATIVE MG/DL
LEUKOCYTE ESTERASE, URINE: NEGATIVE
LIPASE: 53 U/L (ref 13–60)
LYMPHOCYTES ABSOLUTE: 1.99 E9/L (ref 1.5–4)
LYMPHOCYTES RELATIVE PERCENT: 28.2 % (ref 20–42)
MCH RBC QN AUTO: 28.3 PG (ref 26–35)
MCHC RBC AUTO-ENTMCNC: 31 % (ref 32–34.5)
MCV RBC AUTO: 91.4 FL (ref 80–99.9)
MONOCYTES ABSOLUTE: 0.58 E9/L (ref 0.1–0.95)
MONOCYTES RELATIVE PERCENT: 8.2 % (ref 2–12)
NEUTROPHILS ABSOLUTE: 4.38 E9/L (ref 1.8–7.3)
NEUTROPHILS RELATIVE PERCENT: 62.1 % (ref 43–80)
NITRITE, URINE: NEGATIVE
PDW BLD-RTO: 13.2 FL (ref 11.5–15)
PH UA: 6 (ref 5–9)
PLATELET # BLD: 277 E9/L (ref 130–450)
PMV BLD AUTO: 9.4 FL (ref 7–12)
POTASSIUM REFLEX MAGNESIUM: 4.3 MMOL/L (ref 3.5–5)
PROTEIN UA: NEGATIVE MG/DL
RBC # BLD: 4.63 E12/L (ref 3.5–5.5)
RBC UA: ABNORMAL /HPF (ref 0–2)
REASON FOR REJECTION: NORMAL
REJECTED TEST: NORMAL
SODIUM BLD-SCNC: 137 MMOL/L (ref 132–146)
SPECIFIC GRAVITY UA: 1.02 (ref 1–1.03)
TOTAL PROTEIN: 7.4 G/DL (ref 6.4–8.3)
TROPONIN, HIGH SENSITIVITY: <6 NG/L (ref 0–9)
UROBILINOGEN, URINE: 0.2 E.U./DL
WBC # BLD: 7.1 E9/L (ref 4.5–11.5)
WBC UA: ABNORMAL /HPF (ref 0–5)

## 2022-03-06 PROCEDURE — 81001 URINALYSIS AUTO W/SCOPE: CPT

## 2022-03-06 PROCEDURE — 74177 CT ABD & PELVIS W/CONTRAST: CPT

## 2022-03-06 PROCEDURE — 93005 ELECTROCARDIOGRAM TRACING: CPT | Performed by: STUDENT IN AN ORGANIZED HEALTH CARE EDUCATION/TRAINING PROGRAM

## 2022-03-06 PROCEDURE — 71045 X-RAY EXAM CHEST 1 VIEW: CPT

## 2022-03-06 PROCEDURE — 84484 ASSAY OF TROPONIN QUANT: CPT

## 2022-03-06 PROCEDURE — 80048 BASIC METABOLIC PNL TOTAL CA: CPT

## 2022-03-06 PROCEDURE — 6370000000 HC RX 637 (ALT 250 FOR IP): Performed by: STUDENT IN AN ORGANIZED HEALTH CARE EDUCATION/TRAINING PROGRAM

## 2022-03-06 PROCEDURE — 93010 ELECTROCARDIOGRAM REPORT: CPT | Performed by: INTERNAL MEDICINE

## 2022-03-06 PROCEDURE — 99284 EMERGENCY DEPT VISIT MOD MDM: CPT

## 2022-03-06 PROCEDURE — 85025 COMPLETE CBC W/AUTO DIFF WBC: CPT

## 2022-03-06 PROCEDURE — 2580000003 HC RX 258: Performed by: STUDENT IN AN ORGANIZED HEALTH CARE EDUCATION/TRAINING PROGRAM

## 2022-03-06 PROCEDURE — 6360000004 HC RX CONTRAST MEDICATION: Performed by: RADIOLOGY

## 2022-03-06 PROCEDURE — 80076 HEPATIC FUNCTION PANEL: CPT

## 2022-03-06 PROCEDURE — 83690 ASSAY OF LIPASE: CPT

## 2022-03-06 RX ORDER — DOCUSATE SODIUM 100 MG/1
100 CAPSULE, LIQUID FILLED ORAL 2 TIMES DAILY
Qty: 60 CAPSULE | Refills: 0 | Status: SHIPPED | OUTPATIENT
Start: 2022-03-06 | End: 2022-04-05

## 2022-03-06 RX ORDER — 0.9 % SODIUM CHLORIDE 0.9 %
1000 INTRAVENOUS SOLUTION INTRAVENOUS ONCE
Status: COMPLETED | OUTPATIENT
Start: 2022-03-06 | End: 2022-03-06

## 2022-03-06 RX ORDER — DICYCLOMINE HYDROCHLORIDE 10 MG/1
10 CAPSULE ORAL 4 TIMES DAILY
Qty: 20 CAPSULE | Refills: 0 | Status: SHIPPED | OUTPATIENT
Start: 2022-03-06

## 2022-03-06 RX ORDER — ONDANSETRON 4 MG/1
4 TABLET, FILM COATED ORAL 3 TIMES DAILY PRN
Qty: 15 TABLET | Refills: 0 | Status: SHIPPED | OUTPATIENT
Start: 2022-03-06

## 2022-03-06 RX ORDER — POLYETHYLENE GLYCOL 3350 17 G/17G
17 POWDER, FOR SOLUTION ORAL DAILY PRN
Qty: 510 G | Refills: 0 | Status: SHIPPED | OUTPATIENT
Start: 2022-03-06 | End: 2022-04-05

## 2022-03-06 RX ADMIN — IOPAMIDOL 75 ML: 755 INJECTION, SOLUTION INTRAVENOUS at 18:41

## 2022-03-06 RX ADMIN — LIDOCAINE HYDROCHLORIDE: 20 SOLUTION ORAL; TOPICAL at 17:01

## 2022-03-06 RX ADMIN — SODIUM CHLORIDE 1000 ML: 9 INJECTION, SOLUTION INTRAVENOUS at 17:24

## 2022-03-06 ASSESSMENT — ENCOUNTER SYMPTOMS
EYE PAIN: 0
BACK PAIN: 0
VOMITING: 0
WHEEZING: 0
CONSTIPATION: 0
APNEA: 0
CHEST TIGHTNESS: 0
SORE THROAT: 0
RHINORRHEA: 0
COUGH: 0
SHORTNESS OF BREATH: 0
DIARRHEA: 0
TROUBLE SWALLOWING: 0
NAUSEA: 0
ABDOMINAL PAIN: 1
PHOTOPHOBIA: 0

## 2022-03-06 NOTE — ED PROVIDER NOTES
[address]      Pt Name: Emmett Duque  MRN: [id number]  [place] 1945  Date of evaluation: 3/6/2022      CHIEF COMPLAINT       Chief Complaint   Patient presents with    Abdominal Pain     pt having pain for the pst couple of weeks. PCP looking into possible gallbladder issues.  Dizziness        HPI  Emmett Duque is a 67 y.o. female with history of hypertension who presents to the emergency department with complaints of abdominal pain and lightheadedness. The patient states that for the last month or so she has had intermittent epigastric abdominal pain. She states that it happens about an hour before she takes her medications. She states that she was following with Dr. Carmelo Smith in the outpatient setting and had a right upper quadrant ultrasound done that she does not know the results of yet. She was continued to have pain today and therefore came to the ED for further evaluation. She states she also intermittently gets lightheaded. She denies any syncope nausea or vomiting. Denies any chest pain or shortness of breath. She describes her symptoms as achy, intermittent, nothing makes it better or worse. She denies any diarrhea, black or bloody stools or urinary symptoms. Denies any abdominal surgeries. States she did have an endoscopy that showed no signs of ulcer. She is on famotidine. Was just taken off her Carafate. Review of Systems   Constitutional: Negative for chills, diaphoresis, fatigue and fever. HENT: Negative for rhinorrhea, sore throat and trouble swallowing. Eyes: Negative for photophobia and pain. Respiratory: Negative for apnea, cough, chest tightness, shortness of breath and wheezing. Cardiovascular: Negative for chest pain, palpitations and leg swelling. Gastrointestinal: Positive for abdominal pain. Negative for constipation, diarrhea, nausea and vomiting.    Endocrine: Negative for polyuria. Genitourinary: Negative for difficulty urinating and dysuria. Musculoskeletal: Negative for back pain, neck pain and neck stiffness. Skin: Negative for pallor and rash. Neurological: Positive for light-headedness. Negative for dizziness, speech difficulty, weakness and headaches. Psychiatric/Behavioral: Negative for confusion. The patient is not nervous/anxious. Physical Exam  Vitals and nursing note reviewed. Constitutional:       General: She is not in acute distress. Appearance: She is well-developed. Comments: Awake and alert. Sitting in the gurney in no obvious distress. HENT:      Head: Normocephalic and atraumatic. Right Ear: External ear normal.      Left Ear: External ear normal.      Mouth/Throat:      Mouth: Mucous membranes are moist.   Eyes:      General: No scleral icterus. Pupils: Pupils are equal, round, and reactive to light. Cardiovascular:      Rate and Rhythm: Normal rate and regular rhythm. Heart sounds: No murmur heard. Comments: 2+ radial and dorsal pedis pulses bilaterally  Pulmonary:      Effort: Pulmonary effort is normal. No respiratory distress. Breath sounds: Normal breath sounds. No wheezing. Abdominal:      Palpations: Abdomen is soft. Tenderness: There is no abdominal tenderness. There is no right CVA tenderness, left CVA tenderness, guarding or rebound. Musculoskeletal:         General: No tenderness or deformity. Normal range of motion. Cervical back: Normal range of motion and neck supple. Right lower leg: No edema. Left lower leg: No edema. Skin:     General: Skin is warm and dry. Capillary Refill: Capillary refill takes less than 2 seconds. Neurological:      General: No focal deficit present. Mental Status: She is alert and oriented to person, place, and time. Cranial Nerves: No cranial nerve deficit. Sensory: No sensory deficit.       Motor: No weakness or abnormal muscle tone. Psychiatric:         Mood and Affect: Mood normal.         Behavior: Behavior normal.          Procedures     MDM     This is a 58-year-old female with history of hypertension who presents to the emergency department abdominal pain. In the emergency department the patient is awake and alert, hemodynamically stable, afebrile and in no respiratory distress. Abdomen soft and only tender. No right upper quadrant tenderness. No rebound or guarding. Reviewed ultrasound obtained several days ago that showed normal gallbladder no signs of acute cholecystitis. CT imaging of the abdomen was obtained that showed constipation but no other acute intra-abdominal pathology. EKG showed no ischemic changes troponin negative patient not having chest pain less likely acute cardiac etiology of her symptoms. Metabolic panel showed normal electrolytes normal renal function. LFTs normal.  Bilirubin normal.  No signs of acute biliary pathology. Lipase normal not consistent with acute pancreatitis. No leukocytosis. Urinalysis not consistent with UTI. Repeat exam showed soft benign abdomen. Discussed plan for discharge home as well as return precautions patient understands and agrees to the plan. ED Course as of 03/06/22 1721   Mendoza Hernandes Mar 06, 2022   1704 Patient is a 58-year-old female presenting with abdominal pain which she describes as cramping with associated loose stools. No black or bloody stools, nausea, vomiting, fevers, dysuria, or hematuria. States that when these episodes occur she does feel dizzy and lightheaded. She had an outpatient ultrasound which was able to review in the chart which shows no evidence of acute abnormalities. Ultrasound was performed few days ago. She also Dr. Riana Romero. On examination, she is well-appearing. She has mild diffuse abdominal tenderness without peritoneal signs. Work-up is pending [JA]   1716 EKG: This EKG is signed and interpreted by me. Rate: 73  Rhythm: Sinus  Interpretation: Normal sinus rhythm, normal MI interval, normal QRS, normal QT interval, no acute ST or T wave changes,  T wave inversions are unchanged when compared to prior EKG  Comparison: no significant change when compared to prior EKG     [JA]      ED Course User Index  [NAKUL] Bella Ibarra MD           ED Course as of 03/07/22 0039   Sun Mar 06, 2022   1704 Patient is a 50-year-old female presenting with abdominal pain which she describes as cramping with associated loose stools. No black or bloody stools, nausea, vomiting, fevers, dysuria, or hematuria. States that when these episodes occur she does feel dizzy and lightheaded. She had an outpatient ultrasound which was able to review in the chart which shows no evidence of acute abnormalities. Ultrasound was performed few days ago. She also Dr. Shawna Agosto. On examination, she is well-appearing. She has mild diffuse abdominal tenderness without peritoneal signs. Work-up is pending [JA]   1716 EKG: This EKG is signed and interpreted by me. Rate: 73  Rhythm: Sinus  Interpretation: Normal sinus rhythm, normal MI interval, normal QRS, normal QT interval, no acute ST or T wave changes,  T wave inversions are unchanged when compared to prior EKG  Comparison: no significant change when compared to prior EKG     [JA]      ED Course User Index  [NAKUL] Bella Ibarra MD       --------------------------------------------- PAST HISTORY ---------------------------------------------  Past Medical History:  has a past medical history of Arthritis and Hypertension. Past Surgical History:  has a past surgical history that includes Rotator cuff repair (Bilateral) and Total hip arthroplasty (Right, 8/17/2021). Social History:  reports that she has never smoked. She has never used smokeless tobacco. She reports previous alcohol use. She reports that she does not use drugs. Family History: family history is not on file. The patients home medications have been reviewed.     Allergies: Darvocet [propoxyphene n-acetaminophen], Darvon [propoxyphene hcl], and Ibuprofen    -------------------------------------------------- RESULTS -------------------------------------------------  Labs:  Results for orders placed or performed during the hospital encounter of 03/06/22   CBC with Auto Differential   Result Value Ref Range    WBC 7.1 4.5 - 11.5 E9/L    RBC 4.63 3.50 - 5.50 E12/L    Hemoglobin 13.1 11.5 - 15.5 g/dL    Hematocrit 42.3 34.0 - 48.0 %    MCV 91.4 80.0 - 99.9 fL    MCH 28.3 26.0 - 35.0 pg    MCHC 31.0 (L) 32.0 - 34.5 %    RDW 13.2 11.5 - 15.0 fL    Platelets 019 932 - 543 E9/L    MPV 9.4 7.0 - 12.0 fL    Neutrophils % 62.1 43.0 - 80.0 %    Immature Granulocytes % 0.3 0.0 - 5.0 %    Lymphocytes % 28.2 20.0 - 42.0 %    Monocytes % 8.2 2.0 - 12.0 %    Eosinophils % 0.6 0.0 - 6.0 %    Basophils % 0.6 0.0 - 2.0 %    Neutrophils Absolute 4.38 1.80 - 7.30 E9/L    Immature Granulocytes # 0.02 E9/L    Lymphocytes Absolute 1.99 1.50 - 4.00 E9/L    Monocytes Absolute 0.58 0.10 - 0.95 E9/L    Eosinophils Absolute 0.04 (L) 0.05 - 0.50 E9/L    Basophils Absolute 0.04 0.00 - 0.20 K8/K   Basic Metabolic Panel w/ Reflex to MG   Result Value Ref Range    Sodium 137 132 - 146 mmol/L    Potassium reflex Magnesium 4.3 3.5 - 5.0 mmol/L    Chloride 106 98 - 107 mmol/L    CO2 22 22 - 29 mmol/L    Anion Gap 9 7 - 16 mmol/L    Glucose 99 74 - 99 mg/dL    BUN 18 6 - 23 mg/dL    CREATININE 0.6 0.5 - 1.0 mg/dL    GFR Non-African American >60 >=60 mL/min/1.73    GFR African American >60     Calcium 9.6 8.6 - 10.2 mg/dL   Hepatic Function Panel   Result Value Ref Range    Total Protein 7.4 6.4 - 8.3 g/dL    Albumin 3.9 3.5 - 5.2 g/dL    Alkaline Phosphatase 134 (H) 35 - 104 U/L    ALT 14 0 - 32 U/L    AST 23 0 - 31 U/L    Total Bilirubin 0.3 0.0 - 1.2 mg/dL    Bilirubin, Direct <0.2 0.0 - 0.3 mg/dL    Bilirubin, Indirect see below 0.0 - 1.0 mg/dL   Lipase Result Value Ref Range    Lipase 53 13 - 60 U/L   Urinalysis with Microscopic   Result Value Ref Range    Color, UA Yellow Straw/Yellow    Clarity, UA Clear Clear    Glucose, Ur Negative Negative mg/dL    Bilirubin Urine Negative Negative    Ketones, Urine Negative Negative mg/dL    Specific Gravity, UA 1.020 1.005 - 1.030    Blood, Urine SMALL (A) Negative    pH, UA 6.0 5.0 - 9.0    Protein, UA Negative Negative mg/dL    Urobilinogen, Urine 0.2 <2.0 E.U./dL    Nitrite, Urine Negative Negative    Leukocyte Esterase, Urine Negative Negative    WBC, UA 0-1 0 - 5 /HPF    RBC, UA 1-3 0 - 2 /HPF    Bacteria, UA FEW (A) None Seen /HPF   SPECIMEN REJECTION   Result Value Ref Range    Rejected Test trp5     Reason for Rejection see below    Troponin   Result Value Ref Range    Troponin, High Sensitivity <6 0 - 9 ng/L   EKG 12 Lead   Result Value Ref Range    Ventricular Rate 73 BPM    Atrial Rate 73 BPM    P-R Interval 160 ms    QRS Duration 86 ms    Q-T Interval 394 ms    QTc Calculation (Bazett) 434 ms    P Axis 50 degrees    R Axis -14 degrees    T Axis 5 degrees       Radiology:  CT ABDOMEN PELVIS W IV CONTRAST Additional Contrast? None   Final Result   Diffuse colonic fecal retention otherwise no definitive findings to explain   the patient's symptoms. XR CHEST PORTABLE   Final Result   No acute process. ------------------------- NURSING NOTES AND VITALS REVIEWED ---------------------------  Date / Time Roomed:  3/6/2022  4:40 PM  ED Bed Assignment:  19/19    The nursing notes within the ED encounter and vital signs as below have been reviewed.    BP (!) 162/74   Pulse 80   Temp 97.7 °F (36.5 °C)   Resp 16   Ht 5' 2\" (1.575 m)   Wt 164 lb (74.4 kg)   SpO2 97%   BMI 30.00 kg/m²   Oxygen Saturation Interpretation: Normal      ------------------------------------------ PROGRESS NOTES ------------------------------------------    I have spoken with the patient and discussed todays results, in addition to providing specific details for the plan of care and counseling regarding the diagnosis and prognosis. Their questions are answered at this time and they are agreeable with the plan. I discussed at length with them reasons for immediate return here for re evaluation. They will followup with the on call primary care physician by calling their office tomorrow. --------------------------------- ADDITIONAL PROVIDER NOTES ---------------------------------  At this time the patient is without objective evidence of an acute process requiring hospitalization or inpatient management. They have remained hemodynamically stable throughout their entire ED visit and are stable for discharge with outpatient follow-up. The plan has been discussed in detail and they are aware of the specific conditions for emergent return, as well as the importance of follow-up. Discharge Medication List as of 3/6/2022  9:03 PM      START taking these medications    Details   ondansetron (ZOFRAN) 4 MG tablet Take 1 tablet by mouth 3 times daily as needed for Nausea or Vomiting, Disp-15 tablet, R-0Print      polyethylene glycol (GLYCOLAX) 17 GM/SCOOP powder Take 17 g by mouth daily as needed (constipation), Disp-510 g, R-0Print      docusate sodium (COLACE) 100 MG capsule Take 1 capsule by mouth 2 times daily, Disp-60 capsule, R-0Print      dicyclomine (BENTYL) 10 MG capsule Take 1 capsule by mouth 4 times daily, Disp-20 capsule, R-0Print             Diagnosis:  1. Constipation, unspecified constipation type    2. Generalized abdominal pain        Disposition:  Patient's disposition: Discharge to home  Patient's condition is stable.        Lupe Flynn DO  Resident  03/07/22 5426

## 2022-03-06 NOTE — ED NOTES
Patient resting in bed. Granddaughter at bedside. Denies any needs at this time. Awaiting the rest of her results to come back. Call light is within reach.       Tim Augustin RN  03/06/22 4742

## 2022-12-12 ENCOUNTER — OFFICE VISIT (OUTPATIENT)
Dept: ORTHOPEDIC SURGERY | Age: 77
End: 2022-12-12

## 2022-12-12 VITALS — WEIGHT: 182 LBS | HEIGHT: 62 IN | BODY MASS INDEX: 33.49 KG/M2

## 2022-12-12 DIAGNOSIS — M25.562 LEFT KNEE PAIN, UNSPECIFIED CHRONICITY: Primary | ICD-10-CM

## 2022-12-12 DIAGNOSIS — M17.12 PRIMARY OSTEOARTHRITIS OF LEFT KNEE: ICD-10-CM

## 2022-12-12 RX ORDER — TRIAMCINOLONE ACETONIDE 40 MG/ML
80 INJECTION, SUSPENSION INTRA-ARTICULAR; INTRAMUSCULAR ONCE
Status: COMPLETED | OUTPATIENT
Start: 2022-12-12 | End: 2022-12-12

## 2022-12-12 RX ORDER — FLUTICASONE PROPIONATE 50 MCG
1 SPRAY, SUSPENSION (ML) NASAL DAILY
COMMUNITY

## 2022-12-12 RX ORDER — SERTRALINE HYDROCHLORIDE 100 MG/1
100 TABLET, FILM COATED ORAL DAILY
COMMUNITY

## 2022-12-12 RX ORDER — BUPIVACAINE HYDROCHLORIDE 2.5 MG/ML
3 INJECTION, SOLUTION INFILTRATION; PERINEURAL ONCE
Status: COMPLETED | OUTPATIENT
Start: 2022-12-12 | End: 2022-12-12

## 2022-12-12 RX ADMIN — BUPIVACAINE HYDROCHLORIDE 7.5 MG: 2.5 INJECTION, SOLUTION INFILTRATION; PERINEURAL at 12:00

## 2022-12-12 RX ADMIN — TRIAMCINOLONE ACETONIDE 80 MG: 40 INJECTION, SUSPENSION INTRA-ARTICULAR; INTRAMUSCULAR at 12:00

## 2022-12-12 NOTE — PROGRESS NOTES
New Knee Patient     Referring Provider:   No referring provider defined for this encounter. CHIEF COMPLAINT:   Chief Complaint   Patient presents with    Knee Pain     Increase left knee pain. Discuss injection,  cortisone vs visco.  Know she will need eventual surgery. HPI:    Martín Wade is a 65y.o. year old female with severe left knee osteoarthritis. She has been told she needs a knee replacement in the past.  She was treated by Dr. Loel Buerger for her hips previously. She has never had an injection her left knee. She is using a walker. The pain is severe and bothers her every day. She has difficulty getting around. She also complains of restricted range of motion in her left knee. Pain is sharp and nonradiating. Denies fever chills. Denies numbness tingling. PAST MEDICAL HISTORY  Past Medical History:   Diagnosis Date    Arthritis     Hypertension        PAST SURGICAL HISTORY  Past Surgical History:   Procedure Laterality Date    ROTATOR CUFF REPAIR Bilateral     TOTAL HIP ARTHROPLASTY Right 8/17/2021    HIP TOTAL ARTHROPLASTY----KANCHAN performed by Bear Nunn MD at [address] [place] [address]   History reviewed. No pertinent family history. SOCIAL HISTORY  Social History     Socioeconomic History    Marital status:       Spouse name: Not on file    Number of children: Not on file    Years of education: Not on file    Highest education level: Not on file   Occupational History    Not on file   Tobacco Use    Smoking status: Never    Smokeless tobacco: Never   Vaping Use    Vaping Use: Never used   Substance and Sexual Activity    Alcohol use: Not Currently    Drug use: Never    Sexual activity: Not on file   Other Topics Concern    Not on file   Social History Narrative    Not on file     Social Determinants of Health     Financial Resource Strain: Not on file   Food Insecurity: Not on file   Transportation Needs: Not on file   Physical Activity: Not on file Reactions    Darvocet [Propoxyphene N-Acetaminophen]     Darvon [Propoxyphene Hcl]     Ibuprofen Other (See Comments)     Stomach pain with Ibuprofen       Controlled Substances Monitoring:          REVIEW OF SYSTEMS:     Constitutional:  Negative for weight loss, fevers, chills, fatigue  Cardiovascular: Negative for chest pain, palpitations  Pulmonary: Negative for shortness of breath, labored breathing, cough  GI: negative for abdominal pain, nausea, vomitting   MSK: per HPI  Skin: negative for rash, open wounds    All other systems reviewed and are negative         PHYSICAL EXAM     Vitals:    12/12/22 1043   Weight: 182 lb (82.6 kg)   Height: 5' 2\" (1.575 m)       Height: 5' 2\" (1.575 m)  Weight: [unfilled]  BMI:  Body mass index is 33.29 kg/m². General: The patient is alert and oriented x 3, appears to be stated age and in no distress. HEENT: head is normocephalic, atraumatic. EOMI. Neck: supple, trachea midline, no thyromegaly   Cardiovascular: peripheral pulses palpable. Normal Capillary refill   Respiratory: breathing unlabored, chest expansion symmetric   Skin: no rash, no open wounds, no erythema  Psych: normal affect; mood stable  Neurologic: gait normal, sensation grossly intact in extremities  MSK:        Lower Extremity:   Ipsilateral hip exam shows normal range of motion without pain with impingement testing. Left knee: Fixed varus deformity without correction. Range of motion is limited with crepitus from 0 to 90 degrees. Tender palpation of the medial lateral patellofemoral joints. Negative Lachman. Negative posterior drawer. Calf soft compressible           IMAGING:    XR: 4 views of the left bilateral knee show significant end-stage osteoarthritis with near fusion of her left knee with complete destruction of the joint space, severe osteophyte formation        ASSESSMENT  Left knee osteoarthritis, severe    PLAN  -Treatment is because in detail.   We are going to give her steroid injection in her left knee today to get her through the holidays. She is going to need a knee replacement in the future which she understands. She wants to try the shot and then talk about scheduling surgery after the holidays. All of her questions were answered in detail. She is happy with this treatment plan. She can follow-up in 6 weeks. Left knee Steroid Injection    The left knee identified as the injection site. The risk and benefits of a cortisone injection were explained and the patient consented to the injection. Under sterile conditions,the left  knee was injected with a mixture of 80  mg of Kenalog and 3 mL of 0.25% Marcaine without complication. A sterile bandage was applied.        Gaby Seaman, DO  Orthopaedic Surgery   12/12/22   11:26 AM EST

## 2022-12-18 NOTE — TELEPHONE ENCOUNTER
7/22/2021 9:47 am  Fax message to Dr. Josefa Bernabe [phone]: Notification of plans for surgery - [facility] 8/17/2021. Request medical clearance for surgery.  Holding the following medications for surgery: Advil or Ibuprofen and Aspirin 7 days pre op
normal for race

## 2023-02-08 ENCOUNTER — OFFICE VISIT (OUTPATIENT)
Dept: ORTHOPEDIC SURGERY | Age: 78
End: 2023-02-08

## 2023-02-08 VITALS — WEIGHT: 183 LBS | HEIGHT: 62 IN | BODY MASS INDEX: 33.68 KG/M2

## 2023-02-08 DIAGNOSIS — G89.29 CHRONIC MIDLINE LOW BACK PAIN WITHOUT SCIATICA: Primary | ICD-10-CM

## 2023-02-08 DIAGNOSIS — M54.50 CHRONIC MIDLINE LOW BACK PAIN WITHOUT SCIATICA: Primary | ICD-10-CM

## 2023-02-08 NOTE — PROGRESS NOTES
New Knee Patient     Referring Provider:   No referring provider defined for this encounter. CHIEF COMPLAINT:   Chief Complaint   Patient presents with    Hip Pain    Knee Pain     Left hip and knee pain  Had CSI 12/12/22 in the left , states knee is still feeling good from inj        HPI:    Salas Rivas is a 65y.o. year old female with severe left knee osteoarthritis. At her last visit in December she received a knee steroid injection which is working well and still feeling good. She does not need another injection in her knee. Her biggest complaint is low back pain. She ambulates with a walker. The pain is constant and she has been diagnosed with arthritis in the past.  She is interested in starting therapy for her back. PAST MEDICAL HISTORY  Past Medical History:   Diagnosis Date    Arthritis     Hypertension        PAST SURGICAL HISTORY  Past Surgical History:   Procedure Laterality Date    ROTATOR CUFF REPAIR Bilateral     TOTAL HIP ARTHROPLASTY Right 8/17/2021    HIP TOTAL ARTHROPLASTY----KANCHAN performed by Gerri Wilson MD at [address] [place] [address]   History reviewed. No pertinent family history. SOCIAL HISTORY  Social History     Socioeconomic History    Marital status:       Spouse name: Not on file    Number of children: Not on file    Years of education: Not on file    Highest education level: Not on file   Occupational History    Not on file   Tobacco Use    Smoking status: Never    Smokeless tobacco: Never   Vaping Use    Vaping Use: Never used   Substance and Sexual Activity    Alcohol use: Not Currently    Drug use: Never    Sexual activity: Not on file   Other Topics Concern    Not on file   Social History Narrative    Not on file     Social Determinants of Health     Financial Resource Strain: Not on file   Food Insecurity: Not on file   Transportation Needs: Not on file   Physical Activity: Not on file   Stress: Not on file   Social Connections: Not on file Intimate Partner Violence: Not on file   Housing Stability: Not on file     Social History     Occupational History    Not on file   Tobacco Use    Smoking status: Never    Smokeless tobacco: Never   Vaping Use    Vaping Use: Never used   Substance and Sexual Activity    Alcohol use: Not Currently    Drug use: Never    Sexual activity: Not on file       CURRENT MEDICATIONS     Current Outpatient Medications:     sertraline (ZOLOFT) 100 MG tablet, Take 100 mg by mouth daily, Disp: , Rfl:     mirabegron (MYRBETRIQ) 25 MG TB24, Take 25 mg by mouth daily, Disp: , Rfl:     diclofenac sodium (VOLTAREN) 1 % GEL, Apply 4 g topically 4 times daily as needed for Pain, Disp: , Rfl:     fluticasone (FLONASE) 50 MCG/ACT nasal spray, 1 spray by Each Nostril route daily, Disp: , Rfl:     Calcium Carbonate-Vitamin D (CALCIUM PLUS VITAMIN D PO), Take 1 tablet by mouth daily, Disp: , Rfl:     hydrOXYzine (VISTARIL) 25 MG capsule, take 1 capsule by mouth three times a day if needed, Disp: , Rfl:     famotidine (PEPCID) 40 MG tablet, Take 40 mg by mouth nightly, Disp: , Rfl:     ammonium lactate (AMLACTIN) 12 % cream, Apply topically as needed for Dry Skin , Disp: , Rfl:     amLODIPine-benazepril (LOTREL) 5-20 MG per capsule, Take 1 capsule by mouth daily. , Disp: , Rfl:     ondansetron (ZOFRAN) 4 MG tablet, Take 1 tablet by mouth 3 times daily as needed for Nausea or Vomiting (Patient not taking: No sig reported), Disp: 15 tablet, Rfl: 0    dicyclomine (BENTYL) 10 MG capsule, Take 1 capsule by mouth 4 times daily (Patient not taking: No sig reported), Disp: 20 capsule, Rfl: 0    chlorhexidine (PERIDEX) 0.12 % solution, RINSE WITH 1/2 OUNCE BY MOUTH FOR 30 SECONDS THEN SPIT OUT. use twice a day (Patient not taking: Reported on 2/8/2023), Disp: , Rfl:     oxybutynin (DITROPAN) 5 MG tablet, Take 5 mg by mouth daily (Patient not taking: No sig reported), Disp: , Rfl:     ALLERGIES  Allergies   Allergen Reactions    Darvocet [Propoxyphene N-Acetaminophen]     Darvon [Propoxyphene Hcl]     Ibuprofen Other (See Comments)     Stomach pain with Ibuprofen       Controlled Substances Monitoring:          REVIEW OF SYSTEMS:     Constitutional:  Negative for weight loss, fevers, chills, fatigue  Cardiovascular: Negative for chest pain, palpitations  Pulmonary: Negative for shortness of breath, labored breathing, cough  GI: negative for abdominal pain, nausea, vomitting   MSK: per HPI  Skin: negative for rash, open wounds    All other systems reviewed and are negative         PHYSICAL EXAM     Vitals:    02/08/23 0840   Weight: 183 lb (83 kg)   Height: 5' 2\" (1.575 m)       Height: 5' 2\" (1.575 m)  Weight: [unfilled]  BMI:  Body mass index is 33.47 kg/m². General: The patient is alert and oriented x 3, appears to be stated age and in no distress. HEENT: head is normocephalic, atraumatic. EOMI. Neck: supple, trachea midline, no thyromegaly   Cardiovascular: peripheral pulses palpable. Normal Capillary refill   Respiratory: breathing unlabored, chest expansion symmetric   Skin: no rash, no open wounds, no erythema  Psych: normal affect; mood stable  Neurologic: gait normal, sensation grossly intact in extremities  MSK:        Lower Extremity:   Ipsilateral hip exam shows normal range of motion without pain with impingement testing. Left knee: Fixed varus deformity without correction. Range of motion is limited with crepitus from 0 to 90 degrees. Tender palpation of the medial lateral patellofemoral joints. Negative Lachman. Negative posterior drawer. Calf soft compressible           IMAGING:    XR: 4 views of the left bilateral knee show significant end-stage osteoarthritis with near fusion of her left knee with complete destruction of the joint space, severe osteophyte formation.   These were independently reviewed by myself        ASSESSMENT  Left knee osteoarthritis, severe  Degenerative disc disease lumbar spine    PLAN  -Treatment is because in detail. We are going to refer her to outpatient physical therapy for her low back and core strengthening. She can continue to use her walker. If her knee starts to bother her again anytime she can come back for another injection. She is happy with this treatment plan. All of her questions were answered in detail.     Paul Lindo DO   Orthopaedic Surgery   2/8/23  8:43 AM

## 2023-02-21 ENCOUNTER — EVALUATION (OUTPATIENT)
Dept: PHYSICAL THERAPY | Age: 78
End: 2023-02-21
Payer: COMMERCIAL

## 2023-02-21 DIAGNOSIS — Z96.641 STATUS POST TOTAL HIP REPLACEMENT, RIGHT: Primary | ICD-10-CM

## 2023-02-21 PROCEDURE — 97161 PT EVAL LOW COMPLEX 20 MIN: CPT | Performed by: PHYSICAL THERAPIST

## 2023-02-21 PROCEDURE — 97110 THERAPEUTIC EXERCISES: CPT | Performed by: PHYSICAL THERAPIST

## 2023-02-21 NOTE — PROGRESS NOTES
[address] [facility]   Phone: [phone]   Fax: [phone]           Date:  2023   Patient: Marah Johnson  : 1945  MRN: [id number]  Referring Provider: Thor Acosta DO  [address]  [facility]     Medical Diagnosis:     Back pain    SUBJECTIVE:     Onset date: months    Onset: Insidious onset    Mechanism of Injury: Pt reports that she has chronic LBP c reports of hip and knee pain. She reports restriction c ambulation, stairs, and transfers. She uses rollator in the community and SC at home. She has stairs to enter home and does not need to go into her basement because family does laundry for her. Previous PT: yes - helped    Medical Management for Current Problem:  muscle rub    Chief complaint: pain, edema, decreased motion, decreased mobility, weakness, inability / limited ability to use leg, difficulty walking, difficulty with stairs    Behavior: condition is waxing / waning    Pain: waxing and waning  Current: 5/10     Best: 2/10     Worst:10    Symptom Type/Quality: aching  Location[de-identified] Back: midline over the spine and right lateral side does not radiate         Provoking Activities/Positions: standing, walking, prolonged standing, walking long distances, being in one position too long , bending, lifting/carrying/material handling, rising to standing, getting up from low positions                  Relieving Activitie/Positions: sitting    Disturbed Sleep: no  Bladder Dysfunction: no  Bowel Dysfunction: no     Imaging results: No results found.     Past Medical History:  Past Medical History:   Diagnosis Date    Arthritis     Hypertension      Past Surgical History:   Procedure Laterality Date    ROTATOR CUFF REPAIR Bilateral     TOTAL HIP ARTHROPLASTY Right 2021    HIP TOTAL ARTHROPLASTY----KANCHAN performed by Rayo Graves MD at [facility]       Medications:   Current Outpatient Medications   Medication Sig Dispense Refill    sertraline (ZOLOFT) 100 MG tablet Take 100 mg by mouth daily      mirabegron (MYRBETRIQ) 25 MG TB24 Take 25 mg by mouth daily      diclofenac sodium (VOLTAREN) 1 % GEL Apply 4 g topically 4 times daily as needed for Pain      fluticasone (FLONASE) 50 MCG/ACT nasal spray 1 spray by Each Nostril route daily      Calcium Carbonate-Vitamin D (CALCIUM PLUS VITAMIN D PO) Take 1 tablet by mouth daily      ondansetron (ZOFRAN) 4 MG tablet Take 1 tablet by mouth 3 times daily as needed for Nausea or Vomiting (Patient not taking: No sig reported) 15 tablet 0    dicyclomine (BENTYL) 10 MG capsule Take 1 capsule by mouth 4 times daily (Patient not taking: No sig reported) 20 capsule 0    hydrOXYzine (VISTARIL) 25 MG capsule take 1 capsule by mouth three times a day if needed      chlorhexidine (PERIDEX) 0.12 % solution RINSE WITH 1/2 OUNCE BY MOUTH FOR 30 SECONDS THEN SPIT OUT. use twice a day (Patient not taking: Reported on 2/8/2023)      famotidine (PEPCID) 40 MG tablet Take 40 mg by mouth nightly      oxybutynin (DITROPAN) 5 MG tablet Take 5 mg by mouth daily (Patient not taking: No sig reported)      ammonium lactate (AMLACTIN) 12 % cream Apply topically as needed for Dry Skin       amLODIPine-benazepril (LOTREL) 5-20 MG per capsule Take 1 capsule by mouth daily. No current facility-administered medications for this visit. Occupation:  retired- hospital house keeping/dietary .      Exercise regimen: none    Hobbies: senior group- 2x weekly    Patient Goals: pain relief, return to prior activity, get back to normal    Contraindications/Precautions: none    OBJECTIVE:     Observations: well nourished female, normal affect    Inspection: normal orthopedic exam, demonstrates generalized pronated posture (forward head, protracted scapula, internally rotated shoulders, and flexed trunk and lower extremities)         Gait: wide-based, ambulates with rollator    Functional Strength:   Able to toe walk, heel walk, and squat.    Range of Motion:    Trunk: limited by stiffness/ balance globally   Flexion:  [] Normal   [x] Limited 50%   Extension:  [] Normal   [x] Limited 25%    Right Rotation: [] Normal   [x] Limited 50%   Left Rotation:  [] Normal   [x] Limited 50%   Right Side Bending: [] Normal   [x] Limited 50%   Left Side Bending: [] Normal   [x] Limited 50%    Lower Extremity:   Right:   [x] Normal   [] Limited    Left:   [x] Normal   [] Limited       Strength:     Trunk: mildly decreased   R LE: 4/5   L LE: 4/5    Palpation: Tender to palpation L3-5 B unilaterals, piriformis B worse on R     Sensation: intact to light touch and temperature. Special Tests:   [x] Nerve Root Compression           Right []+ / [x] -    Left []+ / [x] -  [] Slump           Right []+ / [] -    Left []+ / [] -  [] FADIR          Right []+ / [] -    Left []+ / [] -  [] S-I Distraction          Right []+ / [] -    Left []+ / [] -     [x] SLR           Right []+ / [x] -    Left []+ / [x] -     [] JOSE ANTONIO          Right []+ / [] -    Left []+ / [] -  [] S-I Compression          Right []+ / [] -    Left []+ / [] -   [x] Leg Length: []+ / [x] -       Comments: DDD    ASSESSMENT     Outcome Measure:   Modified Oswestry 52% disability    Problems:   Pain reported 2-8/10  ROM decreased   Strength decreased  Decreased functional ability with ADLs , IADLs , use of right lower extremity, use of left lower extremity, sitting tolerance, standing tolerance , walking, stairs, limited tolerance to weightbearing tasks and weightbearing duration, bending, reaching, lifting, inability to participate in exercise regimen / fitness program    Reason for Skilled Care: Pt reports to PT secondary to LBP and general debility. Pt requires skilled care to improve global ROM, strength, stability, and overall fxn mobility needed for ADL, rec, and work activity.       [x] There are no barriers affecting plan of care or recovery    [] Barriers to this patient's plan of care or recovery include. Domestic Concerns:  [x] No  [] Yes:      Long Term goals (4-6 weeks)  Decrease reported pain to 0/10  Increase ROM to 75% and pain free  Increase Strength to 5-/5 globally   Able to perform/complete the following functions/tasks: Pt able to sit for 45 min c good posture and no pain, able to ambulate for 15 min s pain or limitation, able to perform 10 STS transfers c single HHA and s limitation or pain. Oswestry Low Back Disability Questionnaire 0-25% disability   Independent with Home Exercise Programs    Rehab Potential: [x] Good  [] Fair  [] Poor    PLAN       Treatment Plan:   [x] Therapeutic Exercise  [x] Therapeutic Activity  [x] Neuromuscular Re-education   [x] Gait Training  [x] Balance Training  [x] Aerobic conditioning  [x] Manual Therapy  [x] Massage/Fascial release   [] Work/Sport specific activities    [] Pain Neuroscience [x] Cold/hotpack  [] Vasocompression  [x] Electrical Stimulation  [x] Lumbar/Cervical Traction  [x] Ultrasound   [] Iontophoresis: 4 mg/mL Dexamethasone Sodium Phosphate 40-80 mAmin  [x] Dry Needling      [x] Instruction in HEP      []  Medication allergies reviewed for use of Dexamethasone Sodium Phosphate 4mg/ml  with iontophoresis treatments. Patient is not allergic. The following CPT codes are likely to be used in the care of this patient: 35599 PT Evaluation: Low Complexity   04698 PT Re-Evaluation   39977 Therapeutic Exercise   [address] Neuromuscular Re-Education   00327 Therapeutic Activities   21767 Manual Therapy   37076 Mechanical Traction   00623 Gait Training   87195      Suggested Professional Referral: [x] No  [] Yes:     Patient Education:  [x] Plans/Goals, Risks/Benefits discussed  [x] Home exercise program  Method of Education: [x] Verbal  [x] Demo  [x] Written  Comprehension of Education:  [x] Verbalizes understanding. [x] Demonstrates understanding. [] Needs Review.   [] Demonstrates/verbalizes understanding of HEP/Ed previously given.    Frequency:  1-2 days per week for 4-6 weeks    Patient understands diagnosis/prognosis and consents to treatment, plan and goals: [x] Yes    [] No     Thank you for the opportunity to work with your patient. If you have questions or comments, please contact me at numbers listed above. Electronically signed by: Skinny Fortune PT         Please sign Physician's Certification and return to: [address] [place] [address]  Dept: [phone]  Dept Fax: [phone]  Loc: [phone] PT DPT [id number]    [id number] Certification / Comments     Frequency/Duration 1-2 days per week for 4-6 weeks. Certification period from 2/21/2023  to 4/21/2023. I have reviewed the Plan of Care established for skilled therapy services and certify that the services are required and that they will be provided while the patient is under my care.     Physician's Comments/Revisions:               Physician's Printed Name:                                           [de-identified] Signature:                                                               Date:

## 2023-02-21 NOTE — PROGRESS NOTES
[address] [facility]   Phone: [phone]   Fax: [phone]      Physical Therapy Treatment Note    Date: 2023  Patient Name: Kate Sheridan  : 1945   MRN: [id number]  [place]: months  DOSx: NA  Referring Provider: Rafael Lynch DO  [address] [facility]     Medical Diagnosis:     Back pain    Outcome Measure: EMERSON 52% limitation    S: see eval  O:  Time 7082-7778     Visit 1/6 Repeat outcome measure at mid point and end. Pain 0/10                       Modalities      MH + ES            Education      Posture, HEP, injury description, exercise rationale      Stretching      HS, SKTC X10 10s holds  TE                       Functional activities To aid in reaching , pushing, pulling tasks at home     ROWS: H  \"    ROWS: M  \"    ROWS: L  \"    Obliques - high  \"    Obliques - low  \"     THEREX     SAQ X20 3s holds B  TE                                                                                                   A:  Tolerated well. Above added to written HEP. She will progress to seated and standing exercise next session.   P: Continue with rehab plan  Jodie Galarza, PT  PT DPT [id number]    Treatment Charges: Mins Units   Initial Evaluation 15 1   Re-Evaluation     Ther Exercise         TE 25 2   Manual Therapy     MT     Ther Activities        TA     Gait Training          GT     Neuro Re-education NR     Modalities     Non-Billable Service Time     Other     Total Time/Units 40 3

## 2023-02-27 ENCOUNTER — TREATMENT (OUTPATIENT)
Dept: PHYSICAL THERAPY | Age: 78
End: 2023-02-27
Payer: COMMERCIAL

## 2023-02-27 DIAGNOSIS — Z96.641 STATUS POST TOTAL HIP REPLACEMENT, RIGHT: Primary | ICD-10-CM

## 2023-02-27 PROCEDURE — 97110 THERAPEUTIC EXERCISES: CPT | Performed by: PHYSICAL THERAPIST

## 2023-02-27 PROCEDURE — 97530 THERAPEUTIC ACTIVITIES: CPT | Performed by: PHYSICAL THERAPIST

## 2023-02-27 NOTE — PROGRESS NOTES
[address] [facility]   Phone: [phone]   Fax: [phone]      Physical Therapy Treatment Note    Date: 2023  Patient Name: Moise Villalta  :    MRN: [id number]  DOInjury: months  DOSx: NA  Referring Provider: Debbi Campos DO  [address] [facility]     Medical Diagnosis:     Back pain    Outcome Measure: EMERSON 52% limitation    S: Pt reports that she began a Lovli and did a workout this weekend. She continues to be limited by L knee pain and LBP  O:  Time 2363-9018     Visit 1 Repeat outcome measure at mid point and end. Pain 0/10                       Modalities      MH + ES            Education      Posture, HEP, injury description, exercise rationale      Stretching      HS, SKTC X10 10s holds  TE                       Functional activities To aid in reaching , pushing, pulling tasks at home     ROWS: H  \"    ROWS: M  \"    ROWS: L  \"    Obliques - high  \"    Obliques - low  \"     THEREX     SAQ X20 3s holds B  TE   SB rollouts X10 10s holds Forward/lateral TE   Seated clams, marching, LAQ, HS curl X30 ea  X15 HS curl B BTB TE   STS 2x15  TA   Step ups foam forward/lateral X15 B ea  TA                                                                           A:  Tolerated well. Pt was progressed c general strengthening and functional mobility needed for ADL activity. She tolerated the session well c moderate fatigue and no incr in pain.   P: Continue with rehab plan  Moris Hansen, PT  PT DPT [id number]    Treatment Charges: Mins Units   Initial Evaluation     Re-Evaluation     Ther Exercise         TE 45 3   Manual Therapy     MT     Ther Activities        TA 10 1   Gait Training          GT     Neuro Re-education NR     Modalities     Non-Billable Service Time     Other     Total Time/Units 55 4

## 2023-03-02 ENCOUNTER — TREATMENT (OUTPATIENT)
Dept: PHYSICAL THERAPY | Age: 78
End: 2023-03-02

## 2023-03-02 DIAGNOSIS — Z96.641 STATUS POST TOTAL HIP REPLACEMENT, RIGHT: Primary | ICD-10-CM

## 2023-03-02 NOTE — PROGRESS NOTES
[facility]   Phone: [phone]   Fax: [phone]      Physical Therapy Treatment Note    Date: 3/2/2023  Patient Name: Candace Garcia  : 1945   MRN: [id number]  DOInjury: months  DOSx: NA  Referring Provider: Jarred Hylton DO  [address] [place] [address]     Medical Diagnosis:     Back pain    Outcome Measure: EMERSON 52% limitation    S: pt AMB into outpt clinic with Rollator. & c/o weakness.    O: Please refer to PT Eval  Time 8467-0071     Visit 2/6 Repeat outcome measure at mid point and end.    Pain 0/10                       Modalities      MH + ES            Education      Posture, HEP, injury description, exercise rationale      Stretching      HS, SKTC X10 10s holds  TE                       Functional activities To aid in reaching , pushing, pulling tasks at home     ROWS: H  \"    ROWS: M  \"    ROWS: L  \"    Obliques - high  \"    Obliques - low  \"     THEREX     Seated clams, marching, LAQ, HS curl X30 ea  X15 HS curl B Purple band TE   Step-outs forward, lateral, & backward X20 each R & L  TA   Stand march X20 R & L  TA   Stand leg curl X20 R & L  TA   Mini-squat  x20  TA                                                   A:  Tolerated well.  The pt progressed to perform new standing exercises without difficulty but required rollator.    P: Continue with rehab plan & progress to include step-ups.    Aly Degroot, PT OHPT [id number]    Treatment Charges: Mins Units   Initial Evaluation     Re-Evaluation     Ther Exercise         TE 13 1   Manual Therapy     MT     Ther Activities        TA 27 2   Gait Training          GT     Neuro Re-education NR     Modalities     Non-Billable Service Time     Other     Total Time/Units 40 3

## 2023-03-08 ENCOUNTER — TREATMENT (OUTPATIENT)
Dept: PHYSICAL THERAPY | Age: 78
End: 2023-03-08
Payer: COMMERCIAL

## 2023-03-08 DIAGNOSIS — Z96.641 STATUS POST TOTAL HIP REPLACEMENT, RIGHT: Primary | ICD-10-CM

## 2023-03-08 PROCEDURE — 97110 THERAPEUTIC EXERCISES: CPT | Performed by: PHYSICAL THERAPIST

## 2023-03-08 PROCEDURE — 97530 THERAPEUTIC ACTIVITIES: CPT | Performed by: PHYSICAL THERAPIST

## 2023-03-08 NOTE — PROGRESS NOTES
[address] [facility]   Phone: [phone]   Fax: [phone]      Physical Therapy Treatment Note    Date: 3/8/2023  Patient Name: Nahun Alicea  : 1945   MRN: [id number]  DOInjury: months  DOSx: NA  Referring Provider: Leo Williamson DO  [address] [facility]     Medical Diagnosis:     Back pain    Outcome Measure: EMERSON 52% limitation    S: pt reports that she continues to have some pain and balance limitation. O: Please refer to PT Eval  Time 3263-3050     Visit 46 Repeat outcome measure at mid point and end. Pain 0/10                       Modalities      MH + ES            Education      Posture, HEP, injury description, exercise rationale      Stretching      HS, SKTC X10 10s holds  TE                       Functional activities To aid in reaching , pushing, pulling tasks at home     ROWS: H  \"    ROWS: M  \"    ROWS: L  \"    Obliques - high  \"    Obliques - low  \"     THEREX     Seated clams, marching, LAQ, HS curl X30 ea  X15 HS curl B Purple band TE   Step-outs forward, lateral, & backward X20 each R & L  TA   Stand march X20 R & L  TA   Stand leg curl X20 R & L  TA   Mini-squat  x20  TA    Performed Today     STS 2x10 [employer] SBA TA   Standing march X30 B [employer] TA   Seated march, clam, LAQ, HS curl, hip add X30 B  TE                           A:  Tolerated well. Pt progressed c general strengthening and stability today to reduce risk of falls. She tolerated the session well c moderate fatigue and no incr in pain. P: Continue with rehab plan & progress to include step-ups.     Chinmay Moe PT DPT [id number]    Treatment Charges: Mins Units   Initial Evaluation     Re-Evaluation     Ther Exercise         TE 25 2   Manual Therapy     MT     Ther Activities        TA 15 1   Gait Training          GT     Neuro Re-education NR     Modalities     Non-Billable Service Time     Other     Total Time/Units 40 3

## 2023-03-13 ENCOUNTER — TREATMENT (OUTPATIENT)
Dept: PHYSICAL THERAPY | Age: 78
End: 2023-03-13
Payer: COMMERCIAL

## 2023-03-13 DIAGNOSIS — Z96.641 STATUS POST TOTAL HIP REPLACEMENT, RIGHT: Primary | ICD-10-CM

## 2023-03-13 PROCEDURE — 97110 THERAPEUTIC EXERCISES: CPT

## 2023-03-13 PROCEDURE — 97530 THERAPEUTIC ACTIVITIES: CPT

## 2023-03-13 NOTE — PROGRESS NOTES
[facility]   Phone: [phone]   Fax: [phone]      Physical Therapy Treatment Note    Date: 3/13/2023  Patient Name: Candace Garcia  : 1945   MRN: [id number]  DOInjury: months  DOSx: NA  Referring Provider: Jarred Hylton DO  [address] [place] [address]     Medical Diagnosis:     Back pain    Outcome Measure: EMERSON 52% limitation    S: Pt reports that she has no current pain upon arrival to PT. States that she still notes some difficulty c balance.    O: Please refer to PT Eval  Time 9395-2020     Visit 5/6 Repeat outcome measure at mid point and end.    Pain 0/10                       Modalities      MH + ES            Education      Posture, HEP, injury description, exercise rationale      Stretching      HS, SKTC X10 10s holds  TE          THEREX     Seated clams, marching, LAQ, HS curl X30 ea  X15 HS curl B Purple band TE   Step-outs forward, lateral, & backward X20 each R & L  TA   Stand march X20 R & L  TA   Stand leg curl X20 R & L  TA   Mini-squat  x20  TA          Performed Today           STS 2x10 WW SBA TA         Standing march X30 B WW TA         Seated march, clam, LAQ, HS curl, hip add X30 B BTB TE         Resisted step-outs X20 R & L  OTB TA               A:  Tolerated well.  Continued to progress patient c strength and stability exercises. Progressed c resisted step-outs this tx session. Performed exercises in order to further prevent risk of falling. Continue c rehab POC.     P: Continue with rehab plan & progress to include step-ups.  Briseida Adrian PTA [id number]  Treatment Charges: Mins Units   Initial Evaluation     Re-Evaluation     Ther Exercise         TE 15 1   Manual Therapy     MT     Ther Activities        TA 25 2   Gait Training          GT     Neuro Re-education NR     Modalities     Non-Billable Service Time     Other     Total Time/Units 40 3

## 2023-05-10 ENCOUNTER — OFFICE VISIT (OUTPATIENT)
Dept: ORTHOPEDIC SURGERY | Age: 78
End: 2023-05-10

## 2023-05-10 DIAGNOSIS — G89.29 CHRONIC MIDLINE LOW BACK PAIN WITHOUT SCIATICA: ICD-10-CM

## 2023-05-10 DIAGNOSIS — M54.50 CHRONIC MIDLINE LOW BACK PAIN WITHOUT SCIATICA: ICD-10-CM

## 2023-05-10 DIAGNOSIS — M25.561 RIGHT KNEE PAIN, UNSPECIFIED CHRONICITY: ICD-10-CM

## 2023-05-10 DIAGNOSIS — M17.0 BILATERAL PRIMARY OSTEOARTHRITIS OF KNEE: Primary | ICD-10-CM

## 2023-05-10 RX ORDER — BUPIVACAINE HYDROCHLORIDE 2.5 MG/ML
3 INJECTION, SOLUTION INFILTRATION; PERINEURAL ONCE
Status: COMPLETED | OUTPATIENT
Start: 2023-05-10 | End: 2023-05-10

## 2023-05-10 RX ORDER — TRIAMCINOLONE ACETONIDE 40 MG/ML
80 INJECTION, SUSPENSION INTRA-ARTICULAR; INTRAMUSCULAR ONCE
Status: COMPLETED | OUTPATIENT
Start: 2023-05-10 | End: 2023-05-10

## 2023-05-10 RX ADMIN — BUPIVACAINE HYDROCHLORIDE 7.5 MG: 2.5 INJECTION, SOLUTION INFILTRATION; PERINEURAL at 15:04

## 2023-05-10 RX ADMIN — TRIAMCINOLONE ACETONIDE 80 MG: 40 INJECTION, SUSPENSION INTRA-ARTICULAR; INTRAMUSCULAR at 15:04

## 2023-05-10 RX ADMIN — BUPIVACAINE HYDROCHLORIDE 7.5 MG: 2.5 INJECTION, SOLUTION INFILTRATION; PERINEURAL at 15:03

## 2023-05-10 NOTE — PROGRESS NOTES
amLODIPine-benazepril (LOTREL) 5-20 MG per capsule, Take 1 capsule by mouth daily. , Disp: , Rfl:     ALLERGIES  Allergies   Allergen Reactions    Darvocet [Propoxyphene N-Acetaminophen]     Darvon [Propoxyphene Hcl]     Ibuprofen Other (See Comments)     Stomach pain with Ibuprofen       Controlled Substances Monitoring:          REVIEW OF SYSTEMS:     Constitutional:  Negative for weight loss, fevers, chills, fatigue  Cardiovascular: Negative for chest pain, palpitations  Pulmonary: Negative for shortness of breath, labored breathing, cough  GI: negative for abdominal pain, nausea, vomitting   MSK: per HPI  Skin: negative for rash, open wounds    All other systems reviewed and are negative         PHYSICAL EXAM     There were no vitals filed for this visit. Height:    Weight: [unfilled]  BMI:  There is no height or weight on file to calculate BMI. General: The patient is alert and oriented x 3, appears to be stated age and in no distress. HEENT: head is normocephalic, atraumatic. EOMI. Neck: supple, trachea midline, no thyromegaly   Cardiovascular: peripheral pulses palpable. Normal Capillary refill   Respiratory: breathing unlabored, chest expansion symmetric   Skin: no rash, no open wounds, no erythema  Psych: normal affect; mood stable  Neurologic: gait normal, sensation grossly intact in extremities  MSK:        Lower Extremity:   Ipsilateral hip exam shows normal range of motion without pain with impingement testing. Left knee: Fixed varus deformity without correction. Range of motion is limited with crepitus from 0 to 90 degrees. Tender palpation of the medial lateral patellofemoral joints. Negative Lachman. Negative posterior drawer. Calf soft compressible    Right knee: Fixed varus deformity without correction. She has crepitus with range of motion from 0 to 90 degrees. She is tender palpation over medial patellofemoral joint. Knee is stable to varus valgus stress testing.   Calf

## 2023-05-24 ENCOUNTER — EVALUATION (OUTPATIENT)
Dept: PHYSICAL THERAPY | Age: 78
End: 2023-05-24

## 2023-05-24 NOTE — PROGRESS NOTES
[address] [facility]   Phone: [phone]   Fax: [phone]    Re-evaluation    Date: 5/24/2023      Medical Diagnosis:     M54.5 low back pain    ATTENDANCE:  Pt returns to PT after appr 2 month absence and trial of HEP. She continues to be limited by R sided LBP. TREATMENTS RECEIVED:  Therapeutic activity, Therapeutic exercise, neuromuscular reeducation, HEP      CURRENT STATUS:  Observations: well nourished female, normal affect    Inspection: normal orthopedic exam, demonstrates generalized pronated posture (forward head, protracted scapula, internally rotated shoulders, and flexed trunk and lower extremities)         Gait: antalgic gait, limp R LE, limp L LE    Functional Strength:   Able to toe walk, heel walk, and squat. Range of Motion:    Trunk: restricted globally by balance and LBP   Flexion:  [] Normal   [] Limited 50%   Extension:  [] Normal   [] Limited 25%    Right Rotation: [] Normal   [] Limited 25%   Left Rotation:  [] Normal   [] Limited 25%   Right Side Bending: [] Normal   [] Limited 25%   Left Side Bending: [] Normal   [] Limited 25%    Lower Extremity:   Right:   [] Normal   [x] Limited    Left:   [] Normal   [x] Limited by OA of the L knee      Strength:     Trunk: mildly decreased   R LE: 4/5   L LE: 4/5    Palpation: Tender to palpation low back     Sensation: intact to light touch and temperature.       Special Tests:   [] Nerve Root Compression           Right []+ / [] -    Left []+ / [] -  [] Slump           Right []+ / [] -    Left []+ / [] -  [] FADIR          Right []+ / [] -    Left []+ / [] -  [] S-I Distraction          Right []+ / [] -    Left []+ / [] -     [] SLR           Right []+ / [] -    Left []+ / [] -     [] JOSE ANTONIO          Right []+ / [] -    Left []+ / [] -  [] S-I Compression          Right []+ / [] -    Left []+ / [] -   [] Leg Length: []+ / [] -       Special Test Comments: DDD, debility    OUTCOME MEASURE: EMERSON 50%

## 2023-05-24 NOTE — PROGRESS NOTES
[address] [facility]   Phone: [phone]   Fax: [phone]      Physical Therapy Treatment Note    Date: 2023  Patient Name: Vijay Thapa  : 1945   MRN: [id number]  DOInjury: months  DOSx: NA  Referring Provider: Oni Champagne DO  [address] [place] [address] Drive  [facility]     Medical Diagnosis:     Back pain    Outcome Measure: EMERSON 52% limitation    S: Pt reports to PT after appr 2 months. She continues to report R sided LBP and limitations c ADLs. O: Please refer to PT Eval  Time 300-345     Visit / Repeat outcome measure at mid point and end. Pain 0/10                       Modalities      MH + ES            Education      Posture, HEP, injury description, exercise rationale      Stretching      HS, SKTC X10 10s holds  TE          THEREX     Seated clams, marching, LAQ, HS curl X30 ea  X15 HS curl B Purple band TE   Step-outs forward, lateral, & backward X20 each R & L  TA   Stand march X20 R & L  TA   Stand leg curl X20 R & L  TA   Mini-squat  x20  TA          Performed Today     SB rollout X10 10s holds  3 pos TE   Seated clams, marching, HS curl, LAQ 3x10 B ea MHP TE   Seated HS stretch X10 10s holds B MHP TE                                             A:  Tolerated well. Pt was reevaluated and determined to require skilled care. She continues to be limited by R sided LBP, effecting ambulation, transfers, and ADLs. She was progressed today c seated stretching and core strength to reduce pain in lumbar spine and improve tolerance to ADLs. She was given a band to perform these activities for HEP. She was agreeable.     P: d/c  Panchito Ren PT DPT [id number]  Treatment Charges: Mins Units   Initial Evaluation     Re-Evaluation 5 0   Ther Exercise         TE 40 3   Manual Therapy     MT     Ther Activities        TA     Gait Training          GT     Neuro Re-education NR     Modalities     Non-Billable Service Time     Other     Total Time/Units 40 3

## 2023-05-24 NOTE — PROGRESS NOTES
[address] [facility]   Phone: [phone]   Fax: [phone]           Date:  2023   Patient: Maxie Cheadle  : 1945  MRN: [id number]  Referring Provider: Leydi Matias DO  [address]  [facility]     Medical Diagnosis:     Back pain    SUBJECTIVE:     Onset date: ***    Onset: {onset:::\"Insidious onset\",\"Sudden onset\",\"Unknown\"}    Mechanism of Injury: ***    Previous PT: {previouspt:55555}    Medical Management for Current Problem: {medmanagement:50307}    Chief complaint: {chiefcomplaint:89209}    Behavior: condition is {condition:43097}    Pain: {pain:08964}  Current: {pain scale:452758042}/10     Best: {pain scale:421716238}/10     Worst:{pain scale:386160213}/10    Symptom Type/Quality: {PAIN QUALITY:87751}  Location[de-identified] Back: {LOCATION:2433964621} {RADIATES:1474949889}         Provoking Activities/Positions: {backaggravators:33355}                 Relieving Activitie/Positions: {backrelievers:41498}    Disturbed Sleep: {sleep:61022}  Bladder Dysfunction: {bladderbowel:60394}  Bowel Dysfunction: {bladderbowel:96582}     Imaging results: XR KNEE RIGHT (3 VIEWS)    Result Date: 5/10/2023  Radiology exam is complete. No Radiologist dictation. Please follow up with ordering provider.        Past Medical History:  Past Medical History:   Diagnosis Date    Arthritis     Hypertension      Past Surgical History:   Procedure Laterality Date    ROTATOR CUFF REPAIR Bilateral     TOTAL HIP ARTHROPLASTY Right 2021    HIP TOTAL ARTHROPLASTY----KANCHAN performed by Janice Velasquez MD at [facility] OR       Medications:   Current Outpatient Medications   Medication Sig Dispense Refill    sertraline (ZOLOFT) 100 MG tablet Take 100 mg by mouth daily      mirabegron (MYRBETRIQ) 25 MG TB24 Take 25 mg by mouth daily      diclofenac sodium (VOLTAREN) 1 % GEL Apply 4 g topically 4 times daily as needed for Pain      fluticasone (FLONASE) 50 MCG/ACT nasal spray 1 spray by

## 2023-06-19 ENCOUNTER — TREATMENT (OUTPATIENT)
Dept: PHYSICAL THERAPY | Age: 78
End: 2023-06-19
Payer: COMMERCIAL

## 2023-06-19 DIAGNOSIS — Z96.641 STATUS POST TOTAL HIP REPLACEMENT, RIGHT: ICD-10-CM

## 2023-06-19 DIAGNOSIS — M54.50 LOW BACK PAIN, UNSPECIFIED BACK PAIN LATERALITY, UNSPECIFIED CHRONICITY, UNSPECIFIED WHETHER SCIATICA PRESENT: Primary | ICD-10-CM

## 2023-06-19 PROCEDURE — 97110 THERAPEUTIC EXERCISES: CPT | Performed by: PHYSICAL THERAPIST

## 2023-06-19 PROCEDURE — 97112 NEUROMUSCULAR REEDUCATION: CPT | Performed by: PHYSICAL THERAPIST

## 2023-06-19 PROCEDURE — 97530 THERAPEUTIC ACTIVITIES: CPT | Performed by: PHYSICAL THERAPIST

## 2023-06-19 NOTE — PROGRESS NOTES
[address] [facility]   Phone: [phone]   Fax: [phone]      Physical Therapy Treatment Note    Date: 2023  Patient Name: Rosmery Montoya  : 1945   MRN: [id number]  DOInjury: months  DOSx: NA  Referring Provider: Veto Torres DO  [address] [place] [address] Drive  [facility]     Medical Diagnosis:     Back pain    Outcome Measure: EMERSON 52% limitation    S: Pt reports fatigue today secondary to the heat. O: Please refer to PT Eval  Time 308-348     Visit 3/6 Repeat outcome measure at mid point and end. Pain 0/10                       Modalities      MH + ES            Education      Posture, HEP, injury description, exercise rationale      Stretching      HS, SKTC X10 10s holds  TE          THEREX     Seated clams, marching, LAQ, HS curl X30 ea  X15 HS curl B Purple band TE   Step-outs forward, lateral, & backward X20 each R & L  TA   Stand march X20 R & L  TA   Stand leg curl X20 R & L  TA   Mini-squat  x20  TA          Performed Today     Seated clams, march, LAQ, HS curl X20 ea BTB TE   STS 3x10  TA   Standing SLR X30 B  NR                                             A:  Tolerated well. General strength and proprioception utilized today to reduce balance limitation and reduce risk of falls. She tolerated the session well c moderate fatigue. Frequent rest breaks given secondary to fatigue and general debility. P: progress to [facility] activity as tolerated.   Shantel Lynne PT DPT [id number]  Treatment Charges: Mins Units   Initial Evaluation     Re-Evaluation     Ther Exercise         TE 15 1   Manual Therapy     MT     Ther Activities        TA 10 1   Gait Training          GT     Neuro Re-education NR 15 1   Modalities     Non-Billable Service Time     Other     Total Time/Units 40 3

## 2023-07-06 ENCOUNTER — TREATMENT (OUTPATIENT)
Dept: PHYSICAL THERAPY | Age: 78
End: 2023-07-06

## 2023-07-06 DIAGNOSIS — Z96.641 STATUS POST TOTAL HIP REPLACEMENT, RIGHT: ICD-10-CM

## 2023-07-06 DIAGNOSIS — M54.50 LOW BACK PAIN, UNSPECIFIED BACK PAIN LATERALITY, UNSPECIFIED CHRONICITY, UNSPECIFIED WHETHER SCIATICA PRESENT: Primary | ICD-10-CM

## 2023-07-07 NOTE — PROGRESS NOTES
[address] [facility]   Phone: [phone]   Fax: [phone]      Physical Therapy Treatment Note    Date: 2023  Patient Name: Miles Jon  : 1945   MRN: [id number]  [place]: months  DOSx: NA  Referring Provider: Cathleen Rodrigez DO  [address] [facility],  [address] [facility] Diagnosis:     Back pain    Outcome Measure: EMERSON 52% limitation    S: Pt c/o weakness & unsteadiness at her BLE today. O: Please refer to PT Eval  Time 0045-3393     Visit  Repeat outcome measure at mid point and end. Pain 0/10                       Modalities      MH + ES            Education      Posture, HEP, injury description, exercise rationale      Stretching             THEREX     Step-outs forward, lateral, & backward X30 each R & L Orange bamd TA   Stand march X30 R & L  TA   Stand leg curl X30 R & L  NR   Mini-squat  x30  TA   Stand heel raises x30  TA   Stand toe raises x30  NR                                             A:  Tolerated well. The pt required Rollator for support during all exercises. P: Plans to progress to include step-ups when appropriate.     Finn Allen, PT OHPT     Treatment Charges: Mins Units   Initial Evaluation     Re-Evaluation     Ther Exercise         TE     Manual Therapy     MT     Ther Activities        TA 30 2   Gait Training          GT     Neuro Re-education NR 10 1   Modalities     Non-Billable Service Time     Other     Total Time/Units 40 3

## 2023-07-13 ENCOUNTER — TREATMENT (OUTPATIENT)
Dept: PHYSICAL THERAPY | Age: 78
End: 2023-07-13

## 2023-07-13 DIAGNOSIS — M54.50 LOW BACK PAIN, UNSPECIFIED BACK PAIN LATERALITY, UNSPECIFIED CHRONICITY, UNSPECIFIED WHETHER SCIATICA PRESENT: Primary | ICD-10-CM

## 2023-07-13 DIAGNOSIS — Z96.641 STATUS POST TOTAL HIP REPLACEMENT, RIGHT: ICD-10-CM

## 2023-07-14 NOTE — PROGRESS NOTES
[email or web address] [facility]   Phone: [phone]   Fax: [phone]      Physical Therapy Treatment Note    Date: 2023  Patient Name: Miles Jon  : 1945   MRN: [id number]  [place]: months  DOSx: NA  Referring Provider: Cathleen Rodrigez DO  [address] [facility],  [address] [place] [address] Diagnosis:     Back pain    Outcome Measure: EMERSON 52% limitation    S: Pt c/o fatigue today to PT.    O: Please refer to PT Eval  Time 1849-1402     Visit 5/6 Repeat outcome measure at mid point and end. Pain 0/10                       Modalities      MH + ES            Education      Posture, HEP, injury description, exercise rationale      Stretching             THEREX     Step-outs forward, lateral, & backward X30 each R & L Green-yellow bamd TA   Stand march X30 R & L Green-yellow band TA   Stand leg curl X30 R & L Green-yellow band NR   Mini-squat  x30  TA   Stand heel raises x30  TA   Stand toe raises x30  NR                                             A:  Tolerated well. The pt required Rollator for support during all exercises. P: Plans to progress to include step-ups when appropriate.     Finn Allen, PT OHPT     Treatment Charges: Mins Units   Initial Evaluation     Re-Evaluation     Ther Exercise         TE     Manual Therapy     MT     Ther Activities        TA 30 2   Gait Training          GT     Neuro Re-education NR 10 1   Modalities     Non-Billable Service Time     Other     Total Time/Units 40 3

## 2023-07-31 ENCOUNTER — TREATMENT (OUTPATIENT)
Dept: PHYSICAL THERAPY | Age: 78
End: 2023-07-31
Payer: COMMERCIAL

## 2023-07-31 DIAGNOSIS — M54.50 LOW BACK PAIN, UNSPECIFIED BACK PAIN LATERALITY, UNSPECIFIED CHRONICITY, UNSPECIFIED WHETHER SCIATICA PRESENT: Primary | ICD-10-CM

## 2023-07-31 DIAGNOSIS — Z96.641 STATUS POST TOTAL HIP REPLACEMENT, RIGHT: ICD-10-CM

## 2023-07-31 PROCEDURE — 97530 THERAPEUTIC ACTIVITIES: CPT | Performed by: PHYSICAL THERAPIST

## 2023-07-31 PROCEDURE — 97110 THERAPEUTIC EXERCISES: CPT | Performed by: PHYSICAL THERAPIST

## 2023-07-31 NOTE — PROGRESS NOTES
[address] [facility]   Phone: [phone]   Fax: [phone]      Physical Therapy Treatment Note    Date: 2023  Patient Name: Delbert Aviles  : 1945   MRN: [id number]  DOInjury: months  DOSx: NA  Referring Provider: Nemesio Tran DO  [address] [facility],  [address] [facility]     Medical Diagnosis:     Back pain    Outcome Measure: EMERSON 52% limitation    S: Pt reports that she continues to have L knee pain and limitations c her balance. O: Please refer to PT Eval  Time 7953-0881     Visit  Repeat outcome measure at mid point and end. Pain 0/10                       Modalities      MH + ES            Education      Posture, HEP, injury description, exercise rationale      Stretching             THEREX     Step-outs forward, lateral, & backward X30 each R & L Green-yellow bamd TA   Stand march X30 R & L Green-yellow band TA   Stand leg curl X30 R & L Green-yellow band NR   Mini-squat  x30  TA   Stand heel raises x30  TA   Stand toe raises x30  NR          Performed Today     Seated hip abd x30 PTB TE   LAQ X30 B PTB TE   STS 2x5  TA   Step ups 2x10 B  TA   Standing SLR X20 B  TA   HS curl X20 B  TE                     A:  Tolerated well. She was progressed today c functional mobility to decr limitation c ADL activity. She tolerated the session well overall. P: Plans to progress to include step-ups when appropriate.     Sushil Brandt PT DPT [id number]    Treatment Charges: Mins Units   Initial Evaluation     Re-Evaluation     Ther Exercise         TE 10 1   Manual Therapy     MT     Ther Activities        TA 30 2   Gait Training          GT     Neuro Re-education NR     Modalities     Non-Billable Service Time     Other     Total Time/Units 40 3

## 2023-08-30 ENCOUNTER — OFFICE VISIT (OUTPATIENT)
Dept: ORTHOPEDIC SURGERY | Age: 78
End: 2023-08-30

## 2023-08-30 VITALS — BODY MASS INDEX: 33.68 KG/M2 | WEIGHT: 183 LBS | HEIGHT: 62 IN

## 2023-08-30 DIAGNOSIS — M17.12 PRIMARY OSTEOARTHRITIS OF LEFT KNEE: Primary | ICD-10-CM

## 2023-08-30 RX ORDER — TRIAMCINOLONE ACETONIDE 40 MG/ML
80 INJECTION, SUSPENSION INTRA-ARTICULAR; INTRAMUSCULAR ONCE
Status: COMPLETED | OUTPATIENT
Start: 2023-08-30 | End: 2023-08-30

## 2023-08-30 RX ORDER — BUPIVACAINE HYDROCHLORIDE 2.5 MG/ML
3 INJECTION, SOLUTION INFILTRATION; PERINEURAL ONCE
Status: COMPLETED | OUTPATIENT
Start: 2023-08-30 | End: 2023-08-30

## 2023-08-30 RX ADMIN — TRIAMCINOLONE ACETONIDE 80 MG: 40 INJECTION, SUSPENSION INTRA-ARTICULAR; INTRAMUSCULAR at 15:31

## 2023-08-30 RX ADMIN — BUPIVACAINE HYDROCHLORIDE 7.5 MG: 2.5 INJECTION, SOLUTION INFILTRATION; PERINEURAL at 15:31

## 2023-08-30 NOTE — PROGRESS NOTES
reviewed by myself  Right knee shows moderate to severe tricompartmental osteoarthritis with varus deformity subchondral sclerosis joint space narrowing and osteophyte formation. All these images were independently interpreted by myself and discussed with the patient. ASSESSMENT  Bilateral knee osteoarthritis    PLAN  -Knee injection provided today. Her right knee is still feeling good. She can follow-up on an as-needed basis for repeat injections    Left knee Steroid Injection    The left knee identified as the injection site. The risk and benefits of a cortisone injection were explained and the patient consented to the injection. Under sterile conditions,the left  knee was injected with a mixture of 80  mg of Kenalog and 3 mL of 0.25% Marcaine without complication. A sterile bandage was applied.      Pedro Luis Rock DO   Orthopaedic Surgery   8/30/23  3:27 PM

## 2023-09-06 ENCOUNTER — HOSPITAL ENCOUNTER (OUTPATIENT)
Dept: ULTRASOUND IMAGING | Age: 78
Discharge: HOME | End: 2023-09-08
Payer: COMMERCIAL

## 2023-09-06 DIAGNOSIS — R42 DIZZINESS AND GIDDINESS: ICD-10-CM

## 2023-09-06 PROCEDURE — 93880 EXTRACRANIAL BILAT STUDY: CPT

## 2023-11-28 ENCOUNTER — HOSPITAL ENCOUNTER (EMERGENCY)
Age: 78
Discharge: HOME | End: 2023-11-28
Payer: COMMERCIAL

## 2023-11-28 ENCOUNTER — APPOINTMENT (OUTPATIENT)
Dept: CT IMAGING | Age: 78
End: 2023-11-28
Payer: COMMERCIAL

## 2023-11-28 ENCOUNTER — APPOINTMENT (OUTPATIENT)
Dept: GENERAL RADIOLOGY | Age: 78
End: 2023-11-28
Payer: COMMERCIAL

## 2023-11-28 VITALS
HEIGHT: 62 IN | HEART RATE: 91 BPM | SYSTOLIC BLOOD PRESSURE: 146 MMHG | TEMPERATURE: 98.4 F | WEIGHT: 170 LBS | DIASTOLIC BLOOD PRESSURE: 69 MMHG | RESPIRATION RATE: 18 BRPM | BODY MASS INDEX: 31.28 KG/M2 | OXYGEN SATURATION: 100 %

## 2023-11-28 DIAGNOSIS — M25.561 CHRONIC PAIN OF BOTH KNEES: ICD-10-CM
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
## 2023-11-28 DIAGNOSIS — M25.562 CHRONIC PAIN OF BOTH KNEES: ICD-10-CM

## 2023-11-28 DIAGNOSIS — G89.29 CHRONIC PAIN OF BOTH KNEES: ICD-10-CM

## 2023-11-28 PROCEDURE — 72170 X-RAY EXAM OF PELVIS: CPT

## 2023-11-28 PROCEDURE — 73562 X-RAY EXAM OF KNEE 3: CPT

## 2023-11-28 PROCEDURE — 72125 CT NECK SPINE W/O DYE: CPT

## 2023-11-28 PROCEDURE — 70450 CT HEAD/BRAIN W/O DYE: CPT

## 2023-11-28 PROCEDURE — 73590 X-RAY EXAM OF LOWER LEG: CPT

## 2023-11-28 PROCEDURE — 99284 EMERGENCY DEPT VISIT MOD MDM: CPT

## 2023-11-28 PROCEDURE — 6370000000 HC RX 637 (ALT 250 FOR IP): Performed by: PHYSICIAN ASSISTANT

## 2023-11-28 RX ORDER — ACETAMINOPHEN 500 MG
500 TABLET ORAL ONCE
Status: COMPLETED | OUTPATIENT
Start: 2023-11-28 | End: 2023-11-28

## 2023-11-28 RX ADMIN — ACETAMINOPHEN 500 MG: 500 TABLET ORAL at 17:54

## 2023-11-28 ASSESSMENT — PAIN SCALES - GENERAL: PAINLEVEL_OUTOF10: 10

## 2023-11-28 ASSESSMENT — PAIN DESCRIPTION - DESCRIPTORS: DESCRIPTORS: ACHING

## 2023-11-28 ASSESSMENT — PAIN - FUNCTIONAL ASSESSMENT: PAIN_FUNCTIONAL_ASSESSMENT: 0-10

## 2023-11-28 ASSESSMENT — PAIN DESCRIPTION - ORIENTATION: ORIENTATION: LEFT;RIGHT

## 2023-11-28 ASSESSMENT — PAIN DESCRIPTION - LOCATION
LOCATION: KNEE
LOCATION: KNEE

## 2023-11-28 NOTE — ED NOTES
Department of Emergency Medicine  FIRST PROVIDER TRIAGE NOTE             Independent MLP           11/28/23  3:35 PM EST    Date of Encounter: 11/28/23   MRN: [id number]      HPI: Irina Zamora is a 77 y.o. female who presents to the ED for Fall Meldon Gabbi getting out of shower today. Left knee pain. Lying on floor for 2 hours)     Fall in shower, did not hit head. Was down for 2 hours until she got her phone to call daughter. Bilateral knee pain, right shin pain. ROS: Negative for cp or sob. PE: Gen Appearance/Constitutional: alert  HEENT: NC/NT. PERRLA,  Airway patent. Initial Plan of Care: All treatment areas with department are currently occupied. Plan to order/Initiate the following while awaiting opening in ED: imaging studies.   Initiate Treatment-Testing, Proceed toTreatment Area When Bed Available for ED Attending/VANEP to Continue Care    Electronically signed by LAI Reid CNP   DD: 11/28/23      LAI Reid CNP  11/28/23 2196

## 2023-12-22 ENCOUNTER — APPOINTMENT (OUTPATIENT)
Dept: GENERAL RADIOLOGY | Age: 78
DRG: 641 | End: 2023-12-22
Payer: MEDICARE

## 2023-12-22 ENCOUNTER — APPOINTMENT (OUTPATIENT)
Dept: CT IMAGING | Age: 78
DRG: 641 | End: 2023-12-22
Payer: MEDICARE

## 2023-12-22 ENCOUNTER — APPOINTMENT (OUTPATIENT)
Dept: ULTRASOUND IMAGING | Age: 78
DRG: 641 | End: 2023-12-22
Payer: MEDICARE

## 2023-12-22 ENCOUNTER — HOSPITAL ENCOUNTER (INPATIENT)
Age: 78
LOS: 6 days | Discharge: NURSING, REHAB OR LONG-TERM CARE | DRG: 641 | End: 2023-12-28
Attending: EMERGENCY MEDICINE | Admitting: INTERNAL MEDICINE
Payer: MEDICARE

## 2023-12-22 DIAGNOSIS — R74.01 TRANSAMINITIS: ICD-10-CM

## 2023-12-22 DIAGNOSIS — R11.2 NAUSEA AND VOMITING, UNSPECIFIED VOMITING TYPE: ICD-10-CM

## 2023-12-22 DIAGNOSIS — E16.2 HYPOGLYCEMIA: Primary | ICD-10-CM

## 2023-12-22 DIAGNOSIS — E87.29 STARVATION KETOACIDOSIS: ICD-10-CM

## 2023-12-22 DIAGNOSIS — T73.0XXA STARVATION KETOACIDOSIS: ICD-10-CM

## 2023-12-22 DIAGNOSIS — R53.1 GENERAL WEAKNESS: ICD-10-CM

## 2023-12-22 LAB
ALBUMIN SERPL-MCNC: 4.1 G/DL (ref 3.5–5.2)
ALP SERPL-CCNC: 133 U/L (ref 35–104)
ALT SERPL-CCNC: 620 U/L (ref 0–32)
ANION GAP SERPL CALCULATED.3IONS-SCNC: 30 MMOL/L (ref 7–16)
AST SERPL-CCNC: 848 U/L (ref 0–31)
BACTERIA URNS QL MICRO: ABNORMAL
BASOPHILS # BLD: 0.04 K/UL (ref 0–0.2)
BASOPHILS NFR BLD: 1 % (ref 0–2)
BILIRUB SERPL-MCNC: 0.5 MG/DL (ref 0–1.2)
BILIRUB UR QL STRIP: ABNORMAL
BNP SERPL-MCNC: 211 PG/ML (ref 0–450)
BUN SERPL-MCNC: 32 MG/DL (ref 6–23)
CALCIUM SERPL-MCNC: 11.3 MG/DL (ref 8.6–10.2)
CASTS #/AREA URNS LPF: ABNORMAL /LPF
CHLORIDE SERPL-SCNC: 101 MMOL/L (ref 98–107)
CLARITY UR: CLEAR
CO2 SERPL-SCNC: 12 MMOL/L (ref 22–29)
COLOR UR: YELLOW
CREAT SERPL-MCNC: 1 MG/DL (ref 0.5–1)
EKG ATRIAL RATE: 89 BPM
EKG P AXIS: 66 DEGREES
EKG P-R INTERVAL: 136 MS
EKG Q-T INTERVAL: 382 MS
EKG QRS DURATION: 86 MS
EKG QTC CALCULATION (BAZETT): 464 MS
EKG R AXIS: -16 DEGREES
EKG T AXIS: 52 DEGREES
EKG VENTRICULAR RATE: 89 BPM
EOSINOPHIL # BLD: 0 K/UL (ref 0.05–0.5)
EOSINOPHILS RELATIVE PERCENT: 0 % (ref 0–6)
ERYTHROCYTE [DISTWIDTH] IN BLOOD BY AUTOMATED COUNT: 15.7 % (ref 11.5–15)
GFR SERPL CREATININE-BSD FRML MDRD: 59 ML/MIN/1.73M2
GLUCOSE BLD-MCNC: 108 MG/DL (ref 74–99)
GLUCOSE BLD-MCNC: 56 MG/DL (ref 74–99)
GLUCOSE BLD-MCNC: 58 MG/DL
GLUCOSE BLD-MCNC: 58 MG/DL (ref 74–99)
GLUCOSE BLD-MCNC: 72 MG/DL (ref 74–99)
GLUCOSE SERPL-MCNC: 57 MG/DL (ref 74–99)
GLUCOSE UR STRIP-MCNC: NEGATIVE MG/DL
HCT VFR BLD AUTO: 44.4 % (ref 34–48)
HGB BLD-MCNC: 13.9 G/DL (ref 11.5–15.5)
HGB UR QL STRIP.AUTO: ABNORMAL
IMM GRANULOCYTES # BLD AUTO: <0.03 K/UL (ref 0–0.58)
IMM GRANULOCYTES NFR BLD: 0 % (ref 0–5)
INFLUENZA A BY PCR: NOT DETECTED
INFLUENZA B BY PCR: NOT DETECTED
KETONES UR STRIP-MCNC: >80 MG/DL
LACTATE BLDV-SCNC: 0.8 MMOL/L (ref 0.5–2.2)
LEUKOCYTE ESTERASE UR QL STRIP: NEGATIVE
LIPASE SERPL-CCNC: 85 U/L (ref 13–60)
LYMPHOCYTES NFR BLD: 1.37 K/UL (ref 1.5–4)
LYMPHOCYTES RELATIVE PERCENT: 19 % (ref 20–42)
MAGNESIUM SERPL-MCNC: 2.4 MG/DL (ref 1.6–2.6)
MCH RBC QN AUTO: 27.6 PG (ref 26–35)
MCHC RBC AUTO-ENTMCNC: 31.3 G/DL (ref 32–34.5)
MCV RBC AUTO: 88.3 FL (ref 80–99.9)
MONOCYTES NFR BLD: 0.63 K/UL (ref 0.1–0.95)
MONOCYTES NFR BLD: 9 % (ref 2–12)
NEUTROPHILS NFR BLD: 71 % (ref 43–80)
NEUTS SEG NFR BLD: 5.13 K/UL (ref 1.8–7.3)
NITRITE UR QL STRIP: NEGATIVE
PH UR STRIP: 6 [PH] (ref 5–9)
PLATELET # BLD AUTO: 249 K/UL (ref 130–450)
PMV BLD AUTO: 10 FL (ref 7–12)
POTASSIUM SERPL-SCNC: 3.9 MMOL/L (ref 3.5–5)
PROT SERPL-MCNC: 7.9 G/DL (ref 6.4–8.3)
PROT UR STRIP-MCNC: 30 MG/DL
RBC # BLD AUTO: 5.03 M/UL (ref 3.5–5.5)
RBC #/AREA URNS HPF: ABNORMAL /HPF
SARS-COV-2 RDRP RESP QL NAA+PROBE: NOT DETECTED
SODIUM SERPL-SCNC: 143 MMOL/L (ref 132–146)
SP GR UR STRIP: >1.03 (ref 1–1.03)
SPECIMEN DESCRIPTION: NORMAL
TROPONIN I SERPL HS-MCNC: 23 NG/L (ref 0–9)
TSH SERPL DL<=0.05 MIU/L-ACNC: 0.98 UIU/ML (ref 0.27–4.2)
UROBILINOGEN UR STRIP-ACNC: 0.2 EU/DL (ref 0–1)
WBC #/AREA URNS HPF: ABNORMAL /HPF
WBC OTHER # BLD: 7.2 K/UL (ref 4.5–11.5)

## 2023-12-22 PROCEDURE — 96375 TX/PRO/DX INJ NEW DRUG ADDON: CPT

## 2023-12-22 PROCEDURE — 74177 CT ABD & PELVIS W/CONTRAST: CPT

## 2023-12-22 PROCEDURE — 80053 COMPREHEN METABOLIC PANEL: CPT

## 2023-12-22 PROCEDURE — 99223 1ST HOSP IP/OBS HIGH 75: CPT | Performed by: INTERNAL MEDICINE

## 2023-12-22 PROCEDURE — 84443 ASSAY THYROID STIM HORMONE: CPT

## 2023-12-22 PROCEDURE — 71045 X-RAY EXAM CHEST 1 VIEW: CPT

## 2023-12-22 PROCEDURE — 6360000002 HC RX W HCPCS: Performed by: INTERNAL MEDICINE

## 2023-12-22 PROCEDURE — 83880 ASSAY OF NATRIURETIC PEPTIDE: CPT

## 2023-12-22 PROCEDURE — 87502 INFLUENZA DNA AMP PROBE: CPT

## 2023-12-22 PROCEDURE — 99285 EMERGENCY DEPT VISIT HI MDM: CPT

## 2023-12-22 PROCEDURE — 93005 ELECTROCARDIOGRAM TRACING: CPT | Performed by: EMERGENCY MEDICINE

## 2023-12-22 PROCEDURE — 70450 CT HEAD/BRAIN W/O DYE: CPT

## 2023-12-22 PROCEDURE — 6370000000 HC RX 637 (ALT 250 FOR IP): Performed by: INTERNAL MEDICINE

## 2023-12-22 PROCEDURE — 84484 ASSAY OF TROPONIN QUANT: CPT

## 2023-12-22 PROCEDURE — 96361 HYDRATE IV INFUSION ADD-ON: CPT

## 2023-12-22 PROCEDURE — 87635 SARS-COV-2 COVID-19 AMP PRB: CPT

## 2023-12-22 PROCEDURE — 6360000002 HC RX W HCPCS

## 2023-12-22 PROCEDURE — 76705 ECHO EXAM OF ABDOMEN: CPT

## 2023-12-22 PROCEDURE — 85025 COMPLETE CBC W/AUTO DIFF WBC: CPT

## 2023-12-22 PROCEDURE — 6360000004 HC RX CONTRAST MEDICATION: Performed by: RADIOLOGY

## 2023-12-22 PROCEDURE — 83735 ASSAY OF MAGNESIUM: CPT

## 2023-12-22 PROCEDURE — 36600 WITHDRAWAL OF ARTERIAL BLOOD: CPT

## 2023-12-22 PROCEDURE — 96366 THER/PROPH/DIAG IV INF ADDON: CPT

## 2023-12-22 PROCEDURE — 87086 URINE CULTURE/COLONY COUNT: CPT

## 2023-12-22 PROCEDURE — 93010 ELECTROCARDIOGRAM REPORT: CPT | Performed by: INTERNAL MEDICINE

## 2023-12-22 PROCEDURE — 2500000003 HC RX 250 WO HCPCS: Performed by: EMERGENCY MEDICINE

## 2023-12-22 PROCEDURE — 2580000003 HC RX 258: Performed by: EMERGENCY MEDICINE

## 2023-12-22 PROCEDURE — 96365 THER/PROPH/DIAG IV INF INIT: CPT

## 2023-12-22 PROCEDURE — 2580000003 HC RX 258: Performed by: INTERNAL MEDICINE

## 2023-12-22 PROCEDURE — 83605 ASSAY OF LACTIC ACID: CPT

## 2023-12-22 PROCEDURE — 81001 URINALYSIS AUTO W/SCOPE: CPT

## 2023-12-22 PROCEDURE — 2580000003 HC RX 258

## 2023-12-22 PROCEDURE — 82962 GLUCOSE BLOOD TEST: CPT

## 2023-12-22 PROCEDURE — 83690 ASSAY OF LIPASE: CPT

## 2023-12-22 PROCEDURE — 1200000000 HC SEMI PRIVATE

## 2023-12-22 RX ORDER — POLYETHYLENE GLYCOL 3350 17 G/17G
17 POWDER, FOR SOLUTION ORAL DAILY PRN
Status: DISCONTINUED | OUTPATIENT
Start: 2023-12-22 | End: 2023-12-28 | Stop reason: HOSPADM

## 2023-12-22 RX ORDER — DEXTROSE MONOHYDRATE 100 MG/ML
INJECTION, SOLUTION INTRAVENOUS CONTINUOUS PRN
Status: DISCONTINUED | OUTPATIENT
Start: 2023-12-22 | End: 2023-12-28 | Stop reason: HOSPADM

## 2023-12-22 RX ORDER — ENOXAPARIN SODIUM 100 MG/ML
40 INJECTION SUBCUTANEOUS DAILY
Status: DISCONTINUED | OUTPATIENT
Start: 2023-12-22 | End: 2023-12-28 | Stop reason: HOSPADM

## 2023-12-22 RX ORDER — ACETAMINOPHEN 650 MG/1
650 SUPPOSITORY RECTAL EVERY 6 HOURS PRN
Status: DISCONTINUED | OUTPATIENT
Start: 2023-12-22 | End: 2023-12-28 | Stop reason: HOSPADM

## 2023-12-22 RX ORDER — 0.9 % SODIUM CHLORIDE 0.9 %
1000 INTRAVENOUS SOLUTION INTRAVENOUS ONCE
Status: COMPLETED | OUTPATIENT
Start: 2023-12-22 | End: 2023-12-22

## 2023-12-22 RX ORDER — FLUTICASONE PROPIONATE 50 MCG
1 SPRAY, SUSPENSION (ML) NASAL DAILY
Status: DISCONTINUED | OUTPATIENT
Start: 2023-12-22 | End: 2023-12-28 | Stop reason: HOSPADM

## 2023-12-22 RX ORDER — POTASSIUM CHLORIDE 7.45 MG/ML
10 INJECTION INTRAVENOUS PRN
Status: DISCONTINUED | OUTPATIENT
Start: 2023-12-22 | End: 2023-12-28 | Stop reason: HOSPADM

## 2023-12-22 RX ORDER — ACETAMINOPHEN 325 MG/1
650 TABLET ORAL EVERY 6 HOURS PRN
Status: DISCONTINUED | OUTPATIENT
Start: 2023-12-22 | End: 2023-12-28 | Stop reason: HOSPADM

## 2023-12-22 RX ORDER — SODIUM CHLORIDE 0.9 % (FLUSH) 0.9 %
5-40 SYRINGE (ML) INJECTION PRN
Status: DISCONTINUED | OUTPATIENT
Start: 2023-12-22 | End: 2023-12-28 | Stop reason: HOSPADM

## 2023-12-22 RX ORDER — DEXTROSE AND SODIUM CHLORIDE 5; .45 G/100ML; G/100ML
INJECTION, SOLUTION INTRAVENOUS CONTINUOUS
Status: DISCONTINUED | OUTPATIENT
Start: 2023-12-22 | End: 2023-12-24

## 2023-12-22 RX ORDER — ONDANSETRON 2 MG/ML
4 INJECTION INTRAMUSCULAR; INTRAVENOUS EVERY 6 HOURS PRN
Status: DISCONTINUED | OUTPATIENT
Start: 2023-12-22 | End: 2023-12-28 | Stop reason: HOSPADM

## 2023-12-22 RX ORDER — SODIUM CHLORIDE 9 MG/ML
INJECTION, SOLUTION INTRAVENOUS PRN
Status: DISCONTINUED | OUTPATIENT
Start: 2023-12-22 | End: 2023-12-28 | Stop reason: HOSPADM

## 2023-12-22 RX ORDER — ONDANSETRON 4 MG/1
4 TABLET, ORALLY DISINTEGRATING ORAL EVERY 8 HOURS PRN
Status: DISCONTINUED | OUTPATIENT
Start: 2023-12-22 | End: 2023-12-28 | Stop reason: HOSPADM

## 2023-12-22 RX ORDER — DICYCLOMINE HYDROCHLORIDE 10 MG/1
10 CAPSULE ORAL 4 TIMES DAILY
Status: DISCONTINUED | OUTPATIENT
Start: 2023-12-22 | End: 2023-12-28 | Stop reason: HOSPADM

## 2023-12-22 RX ORDER — POTASSIUM CHLORIDE 20 MEQ/1
40 TABLET, EXTENDED RELEASE ORAL PRN
Status: DISCONTINUED | OUTPATIENT
Start: 2023-12-22 | End: 2023-12-28 | Stop reason: HOSPADM

## 2023-12-22 RX ORDER — FAMOTIDINE 20 MG/1
40 TABLET, FILM COATED ORAL NIGHTLY
Status: DISCONTINUED | OUTPATIENT
Start: 2023-12-22 | End: 2023-12-28 | Stop reason: HOSPADM

## 2023-12-22 RX ORDER — HYDROXYZINE PAMOATE 25 MG/1
25 CAPSULE ORAL 3 TIMES DAILY PRN
Status: DISCONTINUED | OUTPATIENT
Start: 2023-12-22 | End: 2023-12-28 | Stop reason: HOSPADM

## 2023-12-22 RX ORDER — MAGNESIUM SULFATE IN WATER 40 MG/ML
2000 INJECTION, SOLUTION INTRAVENOUS PRN
Status: DISCONTINUED | OUTPATIENT
Start: 2023-12-22 | End: 2023-12-28 | Stop reason: HOSPADM

## 2023-12-22 RX ORDER — ONDANSETRON 2 MG/ML
4 INJECTION INTRAMUSCULAR; INTRAVENOUS EVERY 6 HOURS PRN
Status: DISCONTINUED | OUTPATIENT
Start: 2023-12-22 | End: 2023-12-22

## 2023-12-22 RX ORDER — SODIUM CHLORIDE 0.9 % (FLUSH) 0.9 %
5-40 SYRINGE (ML) INJECTION EVERY 12 HOURS SCHEDULED
Status: DISCONTINUED | OUTPATIENT
Start: 2023-12-22 | End: 2023-12-28 | Stop reason: HOSPADM

## 2023-12-22 RX ORDER — TROSPIUM CHLORIDE 20 MG/1
20 TABLET, FILM COATED ORAL
Status: DISCONTINUED | OUTPATIENT
Start: 2023-12-23 | End: 2023-12-28 | Stop reason: HOSPADM

## 2023-12-22 RX ADMIN — ONDANSETRON 4 MG: 2 INJECTION INTRAMUSCULAR; INTRAVENOUS at 13:07

## 2023-12-22 RX ADMIN — DEXTROSE MONOHYDRATE 125 ML: 100 INJECTION, SOLUTION INTRAVENOUS at 14:41

## 2023-12-22 RX ADMIN — DEXTROSE AND SODIUM CHLORIDE: 5; 450 INJECTION, SOLUTION INTRAVENOUS at 18:55

## 2023-12-22 RX ADMIN — ENOXAPARIN SODIUM 40 MG: 100 INJECTION SUBCUTANEOUS at 18:56

## 2023-12-22 RX ADMIN — FAMOTIDINE 40 MG: 20 TABLET, FILM COATED ORAL at 20:37

## 2023-12-22 RX ADMIN — SODIUM CHLORIDE 1000 ML: 9 INJECTION, SOLUTION INTRAVENOUS at 13:12

## 2023-12-22 RX ADMIN — SODIUM BICARBONATE: 84 INJECTION, SOLUTION INTRAVENOUS at 15:31

## 2023-12-22 RX ADMIN — DICYCLOMINE HYDROCHLORIDE 10 MG: 10 CAPSULE ORAL at 20:37

## 2023-12-22 RX ADMIN — FLUTICASONE PROPIONATE 1 SPRAY: 50 SPRAY, METERED NASAL at 20:37

## 2023-12-22 RX ADMIN — SODIUM CHLORIDE, PRESERVATIVE FREE 10 ML: 5 INJECTION INTRAVENOUS at 20:38

## 2023-12-22 RX ADMIN — ONDANSETRON 4 MG: 2 INJECTION INTRAMUSCULAR; INTRAVENOUS at 18:16

## 2023-12-22 RX ADMIN — IOPAMIDOL 75 ML: 755 INJECTION, SOLUTION INTRAVENOUS at 16:29

## 2023-12-22 NOTE — ED PROVIDER NOTES
administration in time range)   enoxaparin (LOVENOX) injection 40 mg (has no administration in time range)   ondansetron (ZOFRAN-ODT) disintegrating tablet 4 mg ( Oral See Alternative 23)     Or   ondansetron (ZOFRAN) injection 4 mg (4 mg IntraVENous Given 23)   polyethylene glycol (GLYCOLAX) packet 17 g (has no administration in time range)   acetaminophen (TYLENOL) tablet 650 mg (has no administration in time range)     Or   acetaminophen (TYLENOL) suppository 650 mg (has no administration in time range)   dextrose 5 % and 0.45 % sodium chloride infusion (has no administration in time range)   Glucose (TRUEPLUS) oral gel 15 g (has no administration in time range)   sodium chloride 0.9 % bolus 1,000 mL (0 mLs IntraVENous Stopped 23 143)   iopamidol (ISOVUE-370) 76 % injection 75 mL (75 mLs IntraVENous Given 23 1629)       Labs interpreted by me   EKG interpreted by me in detail on ED course. CXR interpreted above    Please see ED course for more details:    ED Course as of 23 1841   Fri Dec 22, 2023   1241 EC  Ecg read by me  Normal sinus rhythm  Hr 89  Qtc normal no acute ischemia [DAISY]      ED Course User Index  [DAISY] Cheyenne Daugherty DO       Discussion With Other Professionals:  Consultation with hospitalists in order to be admitted     The patient will be admitted for further treatment and evaluation for   1. Hypoglycemia    2. Transaminitis    3. Starvation ketoacidosis    4. Nausea and vomiting, unspecified vomiting type    5. General weakness        Patient has been closely monitored with multiple reevaluations and has remained hemodynamically stable. The patient has clinically improved and through shared decision making, is to be discharged to home. Patient is understanding and agreeable with this plan.   The patient has been instructed to follow-up with PCP as soon as possible and are provided with strict return precautions as to which should return to

## 2023-12-22 NOTE — H&P
[address] [place], [place] [address]ist Group   History and Physical      CHIEF COMPLAINT: N/V/D, fatigue    History of Present Illness:  77 y.o. female with a history of HTN presents with N/V/D and fatigue. Patient states about 4 days ago she developed diarrhea. About 1 episode per day, as well as N/V about 2 episodes daily. Patient states she presented to PCP who suspected UTI and was prescribed antibiotic therapy. She has received 2 doses. She denies any fevers, chills, CP, SOB. Patient received 1L NSS bolus and was placed on sodium HCO3 infusion in ED course. Decision to admit patient for further evaluation and treatment. Informant(s) for H&P: Patient and EMR    REVIEW OF SYSTEMS:  Admits to N/V/D and fatigue. Denies  fevers, chills, cp, sob,  ha, vision/hearing changes, wt changes, hot/cold flashes, other open skin lesions,  constipation, dysuria/hematuria unless noted in HPI. Complete ROS performed with the patient and is otherwise negative. PMH:  Past Medical History:   Diagnosis Date    Arthritis     Hypertension        Surgical History:  Past Surgical History:   Procedure Laterality Date    ROTATOR CUFF REPAIR Bilateral     TOTAL HIP ARTHROPLASTY Right 8/17/2021    HIP TOTAL ARTHROPLASTY----KANCHAN performed by Charity Maier MD at [facility] OR       Medications Prior to Admission:    Prior to Admission medications    Medication Sig Start Date End Date Taking?  Authorizing Provider   sertraline (ZOLOFT) 100 MG tablet Take 1 tablet by mouth daily    Mary Ann Macias MD   mirabegron (MYRBETRIQ) 25 MG TB24 Take 1 tablet by mouth daily    Mary Ann Macias MD   diclofenac sodium (VOLTAREN) 1 % GEL Apply 4 g topically 4 times daily as needed for Pain    Mary Ann Macias MD   fluticasone (FLONASE) 50 MCG/ACT nasal spray 1 spray by Each Nostril route daily    Mary Ann Macias MD   Calcium Carbonate-Vitamin D (CALCIUM PLUS VITAMIN D PO) Take 1 tablet by mouth daily    Colleen

## 2023-12-23 LAB
ALBUMIN SERPL-MCNC: 3.2 G/DL (ref 3.5–5.2)
ALP SERPL-CCNC: 116 U/L (ref 35–104)
ALT SERPL-CCNC: 481 U/L (ref 0–32)
ANION GAP SERPL CALCULATED.3IONS-SCNC: 16 MMOL/L (ref 7–16)
AST SERPL-CCNC: 657 U/L (ref 0–31)
BILIRUB SERPL-MCNC: 0.5 MG/DL (ref 0–1.2)
BUN SERPL-MCNC: 19 MG/DL (ref 6–23)
CALCIUM SERPL-MCNC: 9.4 MG/DL (ref 8.6–10.2)
CHLORIDE SERPL-SCNC: 107 MMOL/L (ref 98–107)
CO2 SERPL-SCNC: 19 MMOL/L (ref 22–29)
CREAT SERPL-MCNC: 0.6 MG/DL (ref 0.5–1)
ERYTHROCYTE [DISTWIDTH] IN BLOOD BY AUTOMATED COUNT: 15.1 % (ref 11.5–15)
GFR SERPL CREATININE-BSD FRML MDRD: >60 ML/MIN/1.73M2
GLUCOSE BLD-MCNC: 102 MG/DL (ref 74–99)
GLUCOSE BLD-MCNC: 114 MG/DL (ref 74–99)
GLUCOSE SERPL-MCNC: 96 MG/DL (ref 74–99)
HCT VFR BLD AUTO: 37.1 % (ref 34–48)
HGB BLD-MCNC: 11.8 G/DL (ref 11.5–15.5)
MAGNESIUM SERPL-MCNC: 2 MG/DL (ref 1.6–2.6)
MCH RBC QN AUTO: 27.6 PG (ref 26–35)
MCHC RBC AUTO-ENTMCNC: 31.8 G/DL (ref 32–34.5)
MCV RBC AUTO: 86.7 FL (ref 80–99.9)
MICROORGANISM SPEC CULT: NO GROWTH
PHOSPHATE SERPL-MCNC: 1.9 MG/DL (ref 2.5–4.5)
PLATELET # BLD AUTO: 210 K/UL (ref 130–450)
PMV BLD AUTO: 10.3 FL (ref 7–12)
POTASSIUM SERPL-SCNC: 3 MMOL/L (ref 3.5–5)
POTASSIUM SERPL-SCNC: 3.9 MMOL/L (ref 3.5–5)
PROT SERPL-MCNC: 6.1 G/DL (ref 6.4–8.3)
RBC # BLD AUTO: 4.28 M/UL (ref 3.5–5.5)
SODIUM SERPL-SCNC: 142 MMOL/L (ref 132–146)
SPECIMEN DESCRIPTION: NORMAL
WBC OTHER # BLD: 5.5 K/UL (ref 4.5–11.5)

## 2023-12-23 PROCEDURE — 84100 ASSAY OF PHOSPHORUS: CPT

## 2023-12-23 PROCEDURE — 82962 GLUCOSE BLOOD TEST: CPT

## 2023-12-23 PROCEDURE — 99233 SBSQ HOSP IP/OBS HIGH 50: CPT | Performed by: INTERNAL MEDICINE

## 2023-12-23 PROCEDURE — 2500000003 HC RX 250 WO HCPCS: Performed by: INTERNAL MEDICINE

## 2023-12-23 PROCEDURE — 80074 ACUTE HEPATITIS PANEL: CPT

## 2023-12-23 PROCEDURE — 84132 ASSAY OF SERUM POTASSIUM: CPT

## 2023-12-23 PROCEDURE — 6370000000 HC RX 637 (ALT 250 FOR IP): Performed by: INTERNAL MEDICINE

## 2023-12-23 PROCEDURE — 83735 ASSAY OF MAGNESIUM: CPT

## 2023-12-23 PROCEDURE — 6360000002 HC RX W HCPCS: Performed by: INTERNAL MEDICINE

## 2023-12-23 PROCEDURE — 2580000003 HC RX 258: Performed by: INTERNAL MEDICINE

## 2023-12-23 PROCEDURE — 80053 COMPREHEN METABOLIC PANEL: CPT

## 2023-12-23 PROCEDURE — 1200000000 HC SEMI PRIVATE

## 2023-12-23 PROCEDURE — 85027 COMPLETE CBC AUTOMATED: CPT

## 2023-12-23 RX ORDER — POTASSIUM CHLORIDE 20 MEQ/1
40 TABLET, EXTENDED RELEASE ORAL ONCE
Status: COMPLETED | OUTPATIENT
Start: 2023-12-23 | End: 2023-12-23

## 2023-12-23 RX ADMIN — TROSPIUM CHLORIDE 20 MG: 20 TABLET, FILM COATED ORAL at 05:18

## 2023-12-23 RX ADMIN — DICYCLOMINE HYDROCHLORIDE 10 MG: 10 CAPSULE ORAL at 17:08

## 2023-12-23 RX ADMIN — ACETAMINOPHEN 650 MG: 325 TABLET ORAL at 17:08

## 2023-12-23 RX ADMIN — DEXTROSE AND SODIUM CHLORIDE: 5; 450 INJECTION, SOLUTION INTRAVENOUS at 12:52

## 2023-12-23 RX ADMIN — POTASSIUM CHLORIDE 10 MEQ: 7.46 INJECTION, SOLUTION INTRAVENOUS at 05:31

## 2023-12-23 RX ADMIN — POTASSIUM CHLORIDE 40 MEQ: 1500 TABLET, EXTENDED RELEASE ORAL at 08:35

## 2023-12-23 RX ADMIN — FLUTICASONE PROPIONATE 1 SPRAY: 50 SPRAY, METERED NASAL at 08:36

## 2023-12-23 RX ADMIN — POTASSIUM PHOSPHATE, MONOBASIC POTASSIUM PHOSPHATE, DIBASIC 20 MMOL: 224; 236 INJECTION, SOLUTION, CONCENTRATE INTRAVENOUS at 10:57

## 2023-12-23 RX ADMIN — DICYCLOMINE HYDROCHLORIDE 10 MG: 10 CAPSULE ORAL at 20:10

## 2023-12-23 RX ADMIN — FAMOTIDINE 40 MG: 20 TABLET, FILM COATED ORAL at 20:10

## 2023-12-23 RX ADMIN — POTASSIUM CHLORIDE 10 MEQ: 7.46 INJECTION, SOLUTION INTRAVENOUS at 06:30

## 2023-12-23 RX ADMIN — ENOXAPARIN SODIUM 40 MG: 100 INJECTION SUBCUTANEOUS at 08:35

## 2023-12-23 RX ADMIN — POTASSIUM CHLORIDE 10 MEQ: 7.46 INJECTION, SOLUTION INTRAVENOUS at 08:38

## 2023-12-23 RX ADMIN — POTASSIUM CHLORIDE 10 MEQ: 7.46 INJECTION, SOLUTION INTRAVENOUS at 07:35

## 2023-12-23 RX ADMIN — HYDROXYZINE PAMOATE 25 MG: 25 CAPSULE ORAL at 20:10

## 2023-12-23 RX ADMIN — DICYCLOMINE HYDROCHLORIDE 10 MG: 10 CAPSULE ORAL at 08:35

## 2023-12-23 RX ADMIN — DEXTROSE AND SODIUM CHLORIDE: 5; 450 INJECTION, SOLUTION INTRAVENOUS at 23:19

## 2023-12-23 RX ADMIN — DICYCLOMINE HYDROCHLORIDE 10 MG: 10 CAPSULE ORAL at 12:49

## 2023-12-23 NOTE — PLAN OF CARE
Problem: Discharge Planning  Goal: Discharge to home or other facility with appropriate resources  Outcome: Progressing     Problem: Safety - Adult  Goal: Free from fall injury  Outcome: Progressing     Problem: Skin/Tissue Integrity  Goal: Absence of new skin breakdown  Description: 1. Monitor for areas of redness and/or skin breakdown  2. Assess vascular access sites hourly  3. Every 4-6 hours minimum:  Change oxygen saturation probe site  4. Every 4-6 hours:  If on nasal continuous positive airway pressure, respiratory therapy assess nares and determine need for appliance change or resting period.   Outcome: Progressing No

## 2023-12-24 LAB
ALBUMIN SERPL-MCNC: 3.2 G/DL (ref 3.5–5.2)
ALP SERPL-CCNC: 122 U/L (ref 35–104)
ALT SERPL-CCNC: 448 U/L (ref 0–32)
ANION GAP SERPL CALCULATED.3IONS-SCNC: 10 MMOL/L (ref 7–16)
AST SERPL-CCNC: 610 U/L (ref 0–31)
BILIRUB SERPL-MCNC: 0.7 MG/DL (ref 0–1.2)
BUN SERPL-MCNC: 8 MG/DL (ref 6–23)
CALCIUM SERPL-MCNC: 9.6 MG/DL (ref 8.6–10.2)
CHLORIDE SERPL-SCNC: 105 MMOL/L (ref 98–107)
CO2 SERPL-SCNC: 23 MMOL/L (ref 22–29)
CREAT SERPL-MCNC: 0.6 MG/DL (ref 0.5–1)
ERYTHROCYTE [DISTWIDTH] IN BLOOD BY AUTOMATED COUNT: 14.8 % (ref 11.5–15)
GFR SERPL CREATININE-BSD FRML MDRD: >60 ML/MIN/1.73M2
GLUCOSE BLD-MCNC: 94 MG/DL (ref 74–99)
GLUCOSE SERPL-MCNC: 78 MG/DL (ref 74–99)
HCT VFR BLD AUTO: 38 % (ref 34–48)
HGB BLD-MCNC: 12.2 G/DL (ref 11.5–15.5)
MAGNESIUM SERPL-MCNC: 1.6 MG/DL (ref 1.6–2.6)
MCH RBC QN AUTO: 27.5 PG (ref 26–35)
MCHC RBC AUTO-ENTMCNC: 32.1 G/DL (ref 32–34.5)
MCV RBC AUTO: 85.8 FL (ref 80–99.9)
PHOSPHATE SERPL-MCNC: 1.9 MG/DL (ref 2.5–4.5)
PLATELET # BLD AUTO: 196 K/UL (ref 130–450)
PMV BLD AUTO: 10.8 FL (ref 7–12)
POTASSIUM SERPL-SCNC: 3.8 MMOL/L (ref 3.5–5)
PROT SERPL-MCNC: 6 G/DL (ref 6.4–8.3)
RBC # BLD AUTO: 4.43 M/UL (ref 3.5–5.5)
SODIUM SERPL-SCNC: 138 MMOL/L (ref 132–146)
WBC OTHER # BLD: 5.4 K/UL (ref 4.5–11.5)

## 2023-12-24 PROCEDURE — 83735 ASSAY OF MAGNESIUM: CPT

## 2023-12-24 PROCEDURE — 82962 GLUCOSE BLOOD TEST: CPT

## 2023-12-24 PROCEDURE — 2500000003 HC RX 250 WO HCPCS: Performed by: INTERNAL MEDICINE

## 2023-12-24 PROCEDURE — 1200000000 HC SEMI PRIVATE

## 2023-12-24 PROCEDURE — 84100 ASSAY OF PHOSPHORUS: CPT

## 2023-12-24 PROCEDURE — 6360000002 HC RX W HCPCS: Performed by: INTERNAL MEDICINE

## 2023-12-24 PROCEDURE — 6370000000 HC RX 637 (ALT 250 FOR IP): Performed by: INTERNAL MEDICINE

## 2023-12-24 PROCEDURE — 99232 SBSQ HOSP IP/OBS MODERATE 35: CPT | Performed by: INTERNAL MEDICINE

## 2023-12-24 PROCEDURE — 80053 COMPREHEN METABOLIC PANEL: CPT

## 2023-12-24 PROCEDURE — 2580000003 HC RX 258: Performed by: INTERNAL MEDICINE

## 2023-12-24 PROCEDURE — 85027 COMPLETE CBC AUTOMATED: CPT

## 2023-12-24 RX ADMIN — FAMOTIDINE 40 MG: 20 TABLET, FILM COATED ORAL at 21:01

## 2023-12-24 RX ADMIN — DICYCLOMINE HYDROCHLORIDE 10 MG: 10 CAPSULE ORAL at 14:01

## 2023-12-24 RX ADMIN — DICYCLOMINE HYDROCHLORIDE 10 MG: 10 CAPSULE ORAL at 21:01

## 2023-12-24 RX ADMIN — POTASSIUM PHOSPHATE, MONOBASIC AND POTASSIUM PHOSPHATE, DIBASIC 20 MMOL: 224; 236 INJECTION, SOLUTION, CONCENTRATE INTRAVENOUS at 16:46

## 2023-12-24 RX ADMIN — DICYCLOMINE HYDROCHLORIDE 10 MG: 10 CAPSULE ORAL at 16:47

## 2023-12-24 RX ADMIN — TROSPIUM CHLORIDE 20 MG: 20 TABLET, FILM COATED ORAL at 06:33

## 2023-12-24 RX ADMIN — SODIUM CHLORIDE, PRESERVATIVE FREE 10 ML: 5 INJECTION INTRAVENOUS at 21:02

## 2023-12-24 RX ADMIN — DICYCLOMINE HYDROCHLORIDE 10 MG: 10 CAPSULE ORAL at 08:56

## 2023-12-24 RX ADMIN — ENOXAPARIN SODIUM 40 MG: 100 INJECTION SUBCUTANEOUS at 08:56

## 2023-12-24 RX ADMIN — SODIUM CHLORIDE, PRESERVATIVE FREE 10 ML: 5 INJECTION INTRAVENOUS at 08:55

## 2023-12-25 LAB
ALBUMIN SERPL-MCNC: 3.1 G/DL (ref 3.5–5.2)
ALP SERPL-CCNC: 120 U/L (ref 35–104)
ALT SERPL-CCNC: 355 U/L (ref 0–32)
ANION GAP SERPL CALCULATED.3IONS-SCNC: 11 MMOL/L (ref 7–16)
AST SERPL-CCNC: 465 U/L (ref 0–31)
BILIRUB SERPL-MCNC: 0.6 MG/DL (ref 0–1.2)
BUN SERPL-MCNC: 9 MG/DL (ref 6–23)
CALCIUM SERPL-MCNC: 9.6 MG/DL (ref 8.6–10.2)
CHLORIDE SERPL-SCNC: 104 MMOL/L (ref 98–107)
CO2 SERPL-SCNC: 24 MMOL/L (ref 22–29)
CREAT SERPL-MCNC: 0.6 MG/DL (ref 0.5–1)
ERYTHROCYTE [DISTWIDTH] IN BLOOD BY AUTOMATED COUNT: 14.6 % (ref 11.5–15)
GFR SERPL CREATININE-BSD FRML MDRD: >60 ML/MIN/1.73M2
GLUCOSE SERPL-MCNC: 93 MG/DL (ref 74–99)
HCT VFR BLD AUTO: 37.5 % (ref 34–48)
HGB BLD-MCNC: 12 G/DL (ref 11.5–15.5)
MAGNESIUM SERPL-MCNC: 1.8 MG/DL (ref 1.6–2.6)
MCH RBC QN AUTO: 27.1 PG (ref 26–35)
MCHC RBC AUTO-ENTMCNC: 32 G/DL (ref 32–34.5)
MCV RBC AUTO: 84.8 FL (ref 80–99.9)
PHOSPHATE SERPL-MCNC: 3.2 MG/DL (ref 2.5–4.5)
PLATELET # BLD AUTO: 182 K/UL (ref 130–450)
PMV BLD AUTO: 10.9 FL (ref 7–12)
POTASSIUM SERPL-SCNC: 3.9 MMOL/L (ref 3.5–5)
PROT SERPL-MCNC: 5.9 G/DL (ref 6.4–8.3)
RBC # BLD AUTO: 4.42 M/UL (ref 3.5–5.5)
SODIUM SERPL-SCNC: 139 MMOL/L (ref 132–146)
WBC OTHER # BLD: 5 K/UL (ref 4.5–11.5)

## 2023-12-25 PROCEDURE — 85027 COMPLETE CBC AUTOMATED: CPT

## 2023-12-25 PROCEDURE — 1200000000 HC SEMI PRIVATE

## 2023-12-25 PROCEDURE — 2580000003 HC RX 258: Performed by: INTERNAL MEDICINE

## 2023-12-25 PROCEDURE — 80053 COMPREHEN METABOLIC PANEL: CPT

## 2023-12-25 PROCEDURE — 99232 SBSQ HOSP IP/OBS MODERATE 35: CPT | Performed by: INTERNAL MEDICINE

## 2023-12-25 PROCEDURE — 83735 ASSAY OF MAGNESIUM: CPT

## 2023-12-25 PROCEDURE — 6370000000 HC RX 637 (ALT 250 FOR IP): Performed by: INTERNAL MEDICINE

## 2023-12-25 PROCEDURE — 84100 ASSAY OF PHOSPHORUS: CPT

## 2023-12-25 PROCEDURE — 6360000002 HC RX W HCPCS: Performed by: INTERNAL MEDICINE

## 2023-12-25 RX ADMIN — DICYCLOMINE HYDROCHLORIDE 10 MG: 10 CAPSULE ORAL at 12:40

## 2023-12-25 RX ADMIN — SODIUM CHLORIDE, PRESERVATIVE FREE 10 ML: 5 INJECTION INTRAVENOUS at 07:55

## 2023-12-25 RX ADMIN — FAMOTIDINE 40 MG: 20 TABLET, FILM COATED ORAL at 21:57

## 2023-12-25 RX ADMIN — TROSPIUM CHLORIDE 20 MG: 20 TABLET, FILM COATED ORAL at 06:40

## 2023-12-25 RX ADMIN — DICYCLOMINE HYDROCHLORIDE 10 MG: 10 CAPSULE ORAL at 07:55

## 2023-12-25 RX ADMIN — DICYCLOMINE HYDROCHLORIDE 10 MG: 10 CAPSULE ORAL at 16:56

## 2023-12-25 RX ADMIN — ACETAMINOPHEN 650 MG: 325 TABLET ORAL at 16:56

## 2023-12-25 RX ADMIN — DICYCLOMINE HYDROCHLORIDE 10 MG: 10 CAPSULE ORAL at 21:57

## 2023-12-25 RX ADMIN — SODIUM CHLORIDE, PRESERVATIVE FREE 10 ML: 5 INJECTION INTRAVENOUS at 21:58

## 2023-12-25 RX ADMIN — ENOXAPARIN SODIUM 40 MG: 100 INJECTION SUBCUTANEOUS at 07:55

## 2023-12-25 NOTE — PLAN OF CARE
Problem: Safety - Adult  Goal: Free from fall injury  12/25/2023 1035 by Gary Bryan RN  Outcome: Progressing  12/25/2023 0444 by Shruti Lopez RN  Outcome: Progressing     Problem: Skin/Tissue Integrity  Goal: Absence of new skin breakdown  Description: 1. Monitor for areas of redness and/or skin breakdown  2. Assess vascular access sites hourly  3. Every 4-6 hours minimum:  Change oxygen saturation probe site  4. Every 4-6 hours:  If on nasal continuous positive airway pressure, respiratory therapy assess nares and determine need for appliance change or resting period.   12/25/2023 1035 by Gary Bryan RN  Outcome: Progressing  12/25/2023 0444 by Shruti Lopez RN  Outcome: Progressing     Problem: Pain  Goal: Verbalizes/displays adequate comfort level or baseline comfort level  12/25/2023 1035 by Gary Bryan RN  Outcome: Progressing  12/25/2023 0444 by Shruti Lopez RN  Outcome: Progressing

## 2023-12-26 PROBLEM — I10 PRIMARY HYPERTENSION: Status: ACTIVE | Noted: 2023-12-26

## 2023-12-26 PROBLEM — E86.0 DEHYDRATION: Status: ACTIVE | Noted: 2023-12-26

## 2023-12-26 PROBLEM — R79.89 ELEVATED LFTS: Status: ACTIVE | Noted: 2023-12-26

## 2023-12-26 PROBLEM — E88.09 HYPOALBUMINEMIA: Status: ACTIVE | Noted: 2023-12-26

## 2023-12-26 LAB
ALBUMIN SERPL-MCNC: 2.9 G/DL (ref 3.5–5.2)
ALP SERPL-CCNC: 140 U/L (ref 35–104)
ALT SERPL-CCNC: 269 U/L (ref 0–32)
ANION GAP SERPL CALCULATED.3IONS-SCNC: 13 MMOL/L (ref 7–16)
AST SERPL-CCNC: 342 U/L (ref 0–31)
BILIRUB SERPL-MCNC: 0.4 MG/DL (ref 0–1.2)
BUN SERPL-MCNC: 9 MG/DL (ref 6–23)
CALCIUM SERPL-MCNC: 9.5 MG/DL (ref 8.6–10.2)
CHLORIDE SERPL-SCNC: 101 MMOL/L (ref 98–107)
CK SERPL-CCNC: 331 U/L (ref 20–180)
CO2 SERPL-SCNC: 24 MMOL/L (ref 22–29)
CREAT SERPL-MCNC: 0.6 MG/DL (ref 0.5–1)
ERYTHROCYTE [DISTWIDTH] IN BLOOD BY AUTOMATED COUNT: 14.3 % (ref 11.5–15)
GFR SERPL CREATININE-BSD FRML MDRD: >60 ML/MIN/1.73M2
GLUCOSE SERPL-MCNC: 98 MG/DL (ref 74–99)
HAV IGM SERPL QL IA: NONREACTIVE
HBV CORE IGM SERPL QL IA: NONREACTIVE
HBV SURFACE AG SERPL QL IA: NONREACTIVE
HCT VFR BLD AUTO: 37 % (ref 34–48)
HCV AB SERPL QL IA: NONREACTIVE
HGB BLD-MCNC: 11.6 G/DL (ref 11.5–15.5)
MAGNESIUM SERPL-MCNC: 1.8 MG/DL (ref 1.6–2.6)
MCH RBC QN AUTO: 27.2 PG (ref 26–35)
MCHC RBC AUTO-ENTMCNC: 31.4 G/DL (ref 32–34.5)
MCV RBC AUTO: 86.9 FL (ref 80–99.9)
PHOSPHATE SERPL-MCNC: 3.1 MG/DL (ref 2.5–4.5)
PLATELET # BLD AUTO: 169 K/UL (ref 130–450)
PMV BLD AUTO: 11.8 FL (ref 7–12)
POTASSIUM SERPL-SCNC: 4.2 MMOL/L (ref 3.5–5)
PROT SERPL-MCNC: 5.8 G/DL (ref 6.4–8.3)
RBC # BLD AUTO: 4.26 M/UL (ref 3.5–5.5)
SODIUM SERPL-SCNC: 138 MMOL/L (ref 132–146)
WBC OTHER # BLD: 5.3 K/UL (ref 4.5–11.5)

## 2023-12-26 PROCEDURE — 97165 OT EVAL LOW COMPLEX 30 MIN: CPT

## 2023-12-26 PROCEDURE — 80053 COMPREHEN METABOLIC PANEL: CPT

## 2023-12-26 PROCEDURE — 1200000000 HC SEMI PRIVATE

## 2023-12-26 PROCEDURE — 84100 ASSAY OF PHOSPHORUS: CPT

## 2023-12-26 PROCEDURE — 97161 PT EVAL LOW COMPLEX 20 MIN: CPT

## 2023-12-26 PROCEDURE — 83735 ASSAY OF MAGNESIUM: CPT

## 2023-12-26 PROCEDURE — 2580000003 HC RX 258: Performed by: INTERNAL MEDICINE

## 2023-12-26 PROCEDURE — 85027 COMPLETE CBC AUTOMATED: CPT

## 2023-12-26 PROCEDURE — 6370000000 HC RX 637 (ALT 250 FOR IP)

## 2023-12-26 PROCEDURE — 6360000002 HC RX W HCPCS: Performed by: INTERNAL MEDICINE

## 2023-12-26 PROCEDURE — 99232 SBSQ HOSP IP/OBS MODERATE 35: CPT | Performed by: INTERNAL MEDICINE

## 2023-12-26 PROCEDURE — 82550 ASSAY OF CK (CPK): CPT

## 2023-12-26 PROCEDURE — 36415 COLL VENOUS BLD VENIPUNCTURE: CPT

## 2023-12-26 PROCEDURE — 6370000000 HC RX 637 (ALT 250 FOR IP): Performed by: INTERNAL MEDICINE

## 2023-12-26 RX ORDER — LANOLIN ALCOHOL/MO/W.PET/CERES
6 CREAM (GRAM) TOPICAL NIGHTLY PRN
Status: DISCONTINUED | OUTPATIENT
Start: 2023-12-26 | End: 2023-12-28 | Stop reason: HOSPADM

## 2023-12-26 RX ADMIN — FAMOTIDINE 40 MG: 20 TABLET, FILM COATED ORAL at 21:57

## 2023-12-26 RX ADMIN — DICYCLOMINE HYDROCHLORIDE 10 MG: 10 CAPSULE ORAL at 21:57

## 2023-12-26 RX ADMIN — DICYCLOMINE HYDROCHLORIDE 10 MG: 10 CAPSULE ORAL at 13:07

## 2023-12-26 RX ADMIN — DICYCLOMINE HYDROCHLORIDE 10 MG: 10 CAPSULE ORAL at 08:37

## 2023-12-26 RX ADMIN — ENOXAPARIN SODIUM 40 MG: 100 INJECTION SUBCUTANEOUS at 08:37

## 2023-12-26 RX ADMIN — DICYCLOMINE HYDROCHLORIDE 10 MG: 10 CAPSULE ORAL at 16:58

## 2023-12-26 RX ADMIN — SODIUM CHLORIDE, PRESERVATIVE FREE 10 ML: 5 INJECTION INTRAVENOUS at 08:37

## 2023-12-26 RX ADMIN — Medication 6 MG: at 22:09

## 2023-12-26 RX ADMIN — SODIUM CHLORIDE, PRESERVATIVE FREE 10 ML: 5 INJECTION INTRAVENOUS at 21:57

## 2023-12-26 RX ADMIN — TROSPIUM CHLORIDE 20 MG: 20 TABLET, FILM COATED ORAL at 06:02

## 2023-12-26 NOTE — PLAN OF CARE
Problem: Discharge Planning  Goal: Discharge to home or other facility with appropriate resources  Outcome: Progressing     Problem: Safety - Adult  Goal: Free from fall injury  12/26/2023 0023 by Abiel Branham RN  Outcome: Progressing  12/25/2023 1035 by Rosi Osborne RN  Outcome: Progressing     Problem: Skin/Tissue Integrity  Goal: Absence of new skin breakdown  Description: 1. Monitor for areas of redness and/or skin breakdown  2. Assess vascular access sites hourly  3. Every 4-6 hours minimum:  Change oxygen saturation probe site  4. Every 4-6 hours:  If on nasal continuous positive airway pressure, respiratory therapy assess nares and determine need for appliance change or resting period.   12/26/2023 0023 by Abiel Branham RN  Outcome: Progressing  12/25/2023 1035 by Rosi Osborne RN  Outcome: Progressing     Problem: Pain  Goal: Verbalizes/displays adequate comfort level or baseline comfort level  12/26/2023 0023 by Abiel Branham RN  Outcome: Progressing  12/25/2023 1035 by Rosi Osborne RN  Outcome: Progressing

## 2023-12-26 NOTE — PLAN OF CARE
Problem: Discharge Planning  Goal: Discharge to home or other facility with appropriate resources  12/26/2023 1350 by Emmanuel Armstrong RN  Outcome: Progressing  12/26/2023 0023 by Mallory Parks RN  Outcome: Progressing     Problem: Safety - Adult  Goal: Free from fall injury  12/26/2023 1350 by Emmanuel Armstrong RN  Outcome: Progressing  12/26/2023 0023 by Mallory Parks RN  Outcome: Progressing     Problem: Skin/Tissue Integrity  Goal: Absence of new skin breakdown  Description: 1. Monitor for areas of redness and/or skin breakdown  2. Assess vascular access sites hourly  3. Every 4-6 hours minimum:  Change oxygen saturation probe site  4. Every 4-6 hours:  If on nasal continuous positive airway pressure, respiratory therapy assess nares and determine need for appliance change or resting period.   12/26/2023 1350 by Emmanuel Armstrong RN  Outcome: Progressing  12/26/2023 0023 by Mallory Parks RN  Outcome: Progressing     Problem: Pain  Goal: Verbalizes/displays adequate comfort level or baseline comfort level  12/26/2023 1350 by Emmanuel Armstrong RN  Outcome: Progressing  12/26/2023 0023 by Mallory Parks RN  Outcome: Progressing

## 2023-12-26 NOTE — CARE COORDINATION
12/26/2023 Social Work Discharge Planning: GI consulted. nausea and vomiting. This worker met with Pt and her two daughters to discuss  role and transition of care/discharge planning. Pt is from home alone and has a cane and rollator. Room air. Awaiting therapy evals for discharge planning. Pt is active with [address] order will be needed if appropriate at discharge. Electronically signed by LAURA Paez on 12/26/2023 at 1:00 PM    12/26/2023  Social Work Discharge [address] [place] [address] 12/24. STEVE choices were given to Pt to review.  Electronically signed by LAURA Paez on 12/26/2023 at 2:46 PM

## 2023-12-27 LAB
ALBUMIN SERPL-MCNC: 3 G/DL (ref 3.5–5.2)
ALP SERPL-CCNC: 122 U/L (ref 35–104)
ALT SERPL-CCNC: 222 U/L (ref 0–32)
ANION GAP SERPL CALCULATED.3IONS-SCNC: 9 MMOL/L (ref 7–16)
AST SERPL-CCNC: 324 U/L (ref 0–31)
BILIRUB SERPL-MCNC: 0.5 MG/DL (ref 0–1.2)
BUN SERPL-MCNC: 5 MG/DL (ref 6–23)
CALCIUM SERPL-MCNC: 9.6 MG/DL (ref 8.6–10.2)
CHLORIDE SERPL-SCNC: 101 MMOL/L (ref 98–107)
CO2 SERPL-SCNC: 28 MMOL/L (ref 22–29)
CREAT SERPL-MCNC: 0.6 MG/DL (ref 0.5–1)
ERYTHROCYTE [DISTWIDTH] IN BLOOD BY AUTOMATED COUNT: 14.1 % (ref 11.5–15)
FERRITIN SERPL-MCNC: 1063 NG/ML
GFR SERPL CREATININE-BSD FRML MDRD: >60 ML/MIN/1.73M2
GLUCOSE BLD-MCNC: 85 MG/DL (ref 74–99)
GLUCOSE SERPL-MCNC: 90 MG/DL (ref 74–99)
HCT VFR BLD AUTO: 38.8 % (ref 34–48)
HETEROPH AB BLD QL IA: NEGATIVE
HGB BLD-MCNC: 12.2 G/DL (ref 11.5–15.5)
INR PPP: 1.1
IRON SATN MFR SERPL: 53 % (ref 15–50)
IRON SERPL-MCNC: 91 UG/DL (ref 37–145)
MAGNESIUM SERPL-MCNC: 2 MG/DL (ref 1.6–2.6)
MCH RBC QN AUTO: 27.7 PG (ref 26–35)
MCHC RBC AUTO-ENTMCNC: 31.4 G/DL (ref 32–34.5)
MCV RBC AUTO: 88 FL (ref 80–99.9)
PHOSPHATE SERPL-MCNC: 3.1 MG/DL (ref 2.5–4.5)
PLATELET # BLD AUTO: 155 K/UL (ref 130–450)
PMV BLD AUTO: 12 FL (ref 7–12)
POTASSIUM SERPL-SCNC: 3.9 MMOL/L (ref 3.5–5)
PROT SERPL-MCNC: 6.1 G/DL (ref 6.4–8.3)
PROTHROMBIN TIME: 12.6 SEC (ref 9.3–12.4)
RBC # BLD AUTO: 4.41 M/UL (ref 3.5–5.5)
SODIUM SERPL-SCNC: 138 MMOL/L (ref 132–146)
TIBC SERPL-MCNC: 172 UG/DL (ref 250–450)
WBC OTHER # BLD: 5.5 K/UL (ref 4.5–11.5)

## 2023-12-27 PROCEDURE — 80053 COMPREHEN METABOLIC PANEL: CPT

## 2023-12-27 PROCEDURE — 99232 SBSQ HOSP IP/OBS MODERATE 35: CPT | Performed by: INTERNAL MEDICINE

## 2023-12-27 PROCEDURE — 86038 ANTINUCLEAR ANTIBODIES: CPT

## 2023-12-27 PROCEDURE — 85027 COMPLETE CBC AUTOMATED: CPT

## 2023-12-27 PROCEDURE — 86308 HETEROPHILE ANTIBODY SCREEN: CPT

## 2023-12-27 PROCEDURE — 82962 GLUCOSE BLOOD TEST: CPT

## 2023-12-27 PROCEDURE — 85610 PROTHROMBIN TIME: CPT

## 2023-12-27 PROCEDURE — 83735 ASSAY OF MAGNESIUM: CPT

## 2023-12-27 PROCEDURE — 84100 ASSAY OF PHOSPHORUS: CPT

## 2023-12-27 PROCEDURE — 82787 IGG 1 2 3 OR 4 EACH: CPT

## 2023-12-27 PROCEDURE — 82728 ASSAY OF FERRITIN: CPT

## 2023-12-27 PROCEDURE — 87799 DETECT AGENT NOS DNA QUANT: CPT

## 2023-12-27 PROCEDURE — 6370000000 HC RX 637 (ALT 250 FOR IP): Performed by: INTERNAL MEDICINE

## 2023-12-27 PROCEDURE — 2580000003 HC RX 258: Performed by: INTERNAL MEDICINE

## 2023-12-27 PROCEDURE — 1200000000 HC SEMI PRIVATE

## 2023-12-27 PROCEDURE — 83540 ASSAY OF IRON: CPT

## 2023-12-27 PROCEDURE — 86039 ANTINUCLEAR ANTIBODIES (ANA): CPT

## 2023-12-27 PROCEDURE — 36415 COLL VENOUS BLD VENIPUNCTURE: CPT

## 2023-12-27 PROCEDURE — 87338 HPYLORI STOOL AG IA: CPT

## 2023-12-27 PROCEDURE — 83550 IRON BINDING TEST: CPT

## 2023-12-27 PROCEDURE — 6360000002 HC RX W HCPCS: Performed by: INTERNAL MEDICINE

## 2023-12-27 PROCEDURE — 82784 ASSAY IGA/IGD/IGG/IGM EACH: CPT

## 2023-12-27 RX ADMIN — DICYCLOMINE HYDROCHLORIDE 10 MG: 10 CAPSULE ORAL at 16:45

## 2023-12-27 RX ADMIN — SODIUM CHLORIDE, PRESERVATIVE FREE 10 ML: 5 INJECTION INTRAVENOUS at 08:08

## 2023-12-27 RX ADMIN — DICYCLOMINE HYDROCHLORIDE 10 MG: 10 CAPSULE ORAL at 08:07

## 2023-12-27 RX ADMIN — ENOXAPARIN SODIUM 40 MG: 100 INJECTION SUBCUTANEOUS at 08:07

## 2023-12-27 RX ADMIN — SODIUM CHLORIDE, PRESERVATIVE FREE 10 ML: 5 INJECTION INTRAVENOUS at 23:07

## 2023-12-27 RX ADMIN — FAMOTIDINE 40 MG: 20 TABLET, FILM COATED ORAL at 23:06

## 2023-12-27 RX ADMIN — DICYCLOMINE HYDROCHLORIDE 10 MG: 10 CAPSULE ORAL at 23:06

## 2023-12-27 RX ADMIN — DICYCLOMINE HYDROCHLORIDE 10 MG: 10 CAPSULE ORAL at 13:23

## 2023-12-27 RX ADMIN — TROSPIUM CHLORIDE 20 MG: 20 TABLET, FILM COATED ORAL at 06:10

## 2023-12-27 NOTE — PLAN OF CARE
Problem: Discharge Planning  Goal: Discharge to home or other facility with appropriate resources  12/27/2023 1125 by Jose Alvarado RN  Outcome: Progressing  12/26/2023 2351 by Roz Watson RN  Outcome: Progressing     Problem: Safety - Adult  Goal: Free from fall injury  12/27/2023 1125 by Jose Alvarado RN  Outcome: Progressing  12/26/2023 2351 by Roz Watson RN  Outcome: Progressing     Problem: Skin/Tissue Integrity  Goal: Absence of new skin breakdown  Description: 1. Monitor for areas of redness and/or skin breakdown  2. Assess vascular access sites hourly  3. Every 4-6 hours minimum:  Change oxygen saturation probe site  4. Every 4-6 hours:  If on nasal continuous positive airway pressure, respiratory therapy assess nares and determine need for appliance change or resting period.   12/27/2023 1125 by Jose Alvarado RN  Outcome: Progressing  12/26/2023 2351 by Roz Watson RN  Outcome: Progressing     Problem: Pain  Goal: Verbalizes/displays adequate comfort level or baseline comfort level  12/27/2023 1125 by Jose Alvarado RN  Outcome: Progressing  12/26/2023 2351 by Roz Watson RN  Outcome: Progressing     Problem: Nutrition Deficit:  Goal: Optimize nutritional status  12/27/2023 1125 by Jose Alvarado RN  Outcome: Progressing  12/26/2023 2351 by Roz Watson RN  Outcome: Progressing

## 2023-12-27 NOTE — CARE COORDINATION
12/27/2023  Social Work Discharge Planning:SW made a referral to [facility]. Waiting reply. Pt is from home alone and has a cane and rollator. Room air. Pt is active with [address] order will be needed if appropriate at discharge . Electronically signed by LAURA Owusu on 12/27/2023 at 10:19 AM    12/27/2023  Social Work Discharge [address] [place] [address] accepts Pt and will start precert. ERINN,7000 and transport form are completed.  Electronically signed by LAURA Owusu on 12/27/2023 at 1:19 PM

## 2023-12-27 NOTE — CONSULTS
ordered  Liver serology ordered  Trend labs  Check inr   Diet as tolerated  Supportive care  Continue to monitor   Discharge planning to rehab, ok from GI POV when able; if d/c'd will order labs for follow up     Note: This report was completed utilizing computer voice recognition software. Every effort has been made to ensure accuracy, however; inadvertent computerized transcription errors may be present. Thank you very much for your consultation. We will follow closely with you. Discussed with Dr. Ofe Varghese developed by Dr. Darell Nj, LAI-NP-C 12/27/2023 10:49 AM for Dr. Karly Díaz Attending Addendum:  The patient was seen and examined independently from the midlevel team. The case was discuss with the team and the above assessment and plan was developed. Daughter present this afternoon and case was discussed with her.      Phuong Bueno DO

## 2023-12-27 NOTE — PLAN OF CARE
Problem: Discharge Planning  Goal: Discharge to home or other facility with appropriate resources  12/26/2023 2351 by Lobito Lezama RN  Outcome: Progressing  12/26/2023 1350 by Juan Redmond RN  Outcome: Progressing     Problem: Safety - Adult  Goal: Free from fall injury  12/26/2023 2351 by Lobito Lezama RN  Outcome: Progressing  12/26/2023 1350 by Juan Redmond RN  Outcome: Progressing     Problem: Skin/Tissue Integrity  Goal: Absence of new skin breakdown  Description: 1. Monitor for areas of redness and/or skin breakdown  2. Assess vascular access sites hourly  3. Every 4-6 hours minimum:  Change oxygen saturation probe site  4. Every 4-6 hours:  If on nasal continuous positive airway pressure, respiratory therapy assess nares and determine need for appliance change or resting period.   12/26/2023 2351 by Lobito Lezama RN  Outcome: Progressing  12/26/2023 1350 by Juan Redmond RN  Outcome: Progressing     Problem: Pain  Goal: Verbalizes/displays adequate comfort level or baseline comfort level  12/26/2023 2351 by Lobito Lezama RN  Outcome: Progressing  12/26/2023 1350 by Juan Redmond RN  Outcome: Progressing     Problem: Nutrition Deficit:  Goal: Optimize nutritional status  Outcome: Progressing

## 2023-12-28 VITALS
TEMPERATURE: 98.3 F | HEART RATE: 83 BPM | HEIGHT: 62 IN | SYSTOLIC BLOOD PRESSURE: 145 MMHG | BODY MASS INDEX: 29.37 KG/M2 | OXYGEN SATURATION: 96 % | DIASTOLIC BLOOD PRESSURE: 72 MMHG | WEIGHT: 159.61 LBS | RESPIRATION RATE: 16 BRPM

## 2023-12-28 LAB
ALBUMIN SERPL-MCNC: 3.2 G/DL (ref 3.5–5.2)
ALP SERPL-CCNC: 122 U/L (ref 35–104)
ALT SERPL-CCNC: 207 U/L (ref 0–32)
ANA SER QL IA: NEGATIVE
ANION GAP SERPL CALCULATED.3IONS-SCNC: 11 MMOL/L (ref 7–16)
AST SERPL-CCNC: 314 U/L (ref 0–31)
BILIRUB SERPL-MCNC: 0.5 MG/DL (ref 0–1.2)
BUN SERPL-MCNC: 5 MG/DL (ref 6–23)
CALCIUM SERPL-MCNC: 9.2 MG/DL (ref 8.6–10.2)
CHLORIDE SERPL-SCNC: 100 MMOL/L (ref 98–107)
CO2 SERPL-SCNC: 24 MMOL/L (ref 22–29)
CREAT SERPL-MCNC: 0.7 MG/DL (ref 0.5–1)
ERYTHROCYTE [DISTWIDTH] IN BLOOD BY AUTOMATED COUNT: 14.1 % (ref 11.5–15)
GFR SERPL CREATININE-BSD FRML MDRD: >60 ML/MIN/1.73M2
GLUCOSE SERPL-MCNC: 95 MG/DL (ref 74–99)
HCT VFR BLD AUTO: 37.7 % (ref 34–48)
HGB BLD-MCNC: 12.1 G/DL (ref 11.5–15.5)
IGG SERPL-MCNC: 936 MG/DL (ref 700–1600)
IGM SERPL-MCNC: 49 MG/DL (ref 40–230)
MAGNESIUM SERPL-MCNC: 1.9 MG/DL (ref 1.6–2.6)
MCH RBC QN AUTO: 27.6 PG (ref 26–35)
MCHC RBC AUTO-ENTMCNC: 32.1 G/DL (ref 32–34.5)
MCV RBC AUTO: 85.9 FL (ref 80–99.9)
PHOSPHATE SERPL-MCNC: 3.1 MG/DL (ref 2.5–4.5)
PLATELET # BLD AUTO: 178 K/UL (ref 130–450)
PMV BLD AUTO: 12.1 FL (ref 7–12)
POTASSIUM SERPL-SCNC: 3.9 MMOL/L (ref 3.5–5)
PROT SERPL-MCNC: 6.2 G/DL (ref 6.4–8.3)
RBC # BLD AUTO: 4.39 M/UL (ref 3.5–5.5)
SODIUM SERPL-SCNC: 135 MMOL/L (ref 132–146)
WBC OTHER # BLD: 7.1 K/UL (ref 4.5–11.5)

## 2023-12-28 PROCEDURE — 6370000000 HC RX 637 (ALT 250 FOR IP): Performed by: INTERNAL MEDICINE

## 2023-12-28 PROCEDURE — 2580000003 HC RX 258: Performed by: INTERNAL MEDICINE

## 2023-12-28 PROCEDURE — 99239 HOSP IP/OBS DSCHRG MGMT >30: CPT | Performed by: INTERNAL MEDICINE

## 2023-12-28 PROCEDURE — 36415 COLL VENOUS BLD VENIPUNCTURE: CPT

## 2023-12-28 PROCEDURE — 85027 COMPLETE CBC AUTOMATED: CPT

## 2023-12-28 PROCEDURE — 87799 DETECT AGENT NOS DNA QUANT: CPT

## 2023-12-28 PROCEDURE — 83735 ASSAY OF MAGNESIUM: CPT

## 2023-12-28 PROCEDURE — 80053 COMPREHEN METABOLIC PANEL: CPT

## 2023-12-28 PROCEDURE — 6360000002 HC RX W HCPCS: Performed by: INTERNAL MEDICINE

## 2023-12-28 PROCEDURE — 97530 THERAPEUTIC ACTIVITIES: CPT

## 2023-12-28 PROCEDURE — 84100 ASSAY OF PHOSPHORUS: CPT

## 2023-12-28 RX ADMIN — FAMOTIDINE 40 MG: 20 TABLET, FILM COATED ORAL at 20:12

## 2023-12-28 RX ADMIN — SODIUM CHLORIDE, PRESERVATIVE FREE 10 ML: 5 INJECTION INTRAVENOUS at 08:52

## 2023-12-28 RX ADMIN — SODIUM CHLORIDE, PRESERVATIVE FREE 10 ML: 5 INJECTION INTRAVENOUS at 20:12

## 2023-12-28 RX ADMIN — DICYCLOMINE HYDROCHLORIDE 10 MG: 10 CAPSULE ORAL at 16:44

## 2023-12-28 RX ADMIN — TROSPIUM CHLORIDE 20 MG: 20 TABLET, FILM COATED ORAL at 06:03

## 2023-12-28 RX ADMIN — FLUTICASONE PROPIONATE 1 SPRAY: 50 SPRAY, METERED NASAL at 08:49

## 2023-12-28 RX ADMIN — DICYCLOMINE HYDROCHLORIDE 10 MG: 10 CAPSULE ORAL at 08:49

## 2023-12-28 RX ADMIN — DICYCLOMINE HYDROCHLORIDE 10 MG: 10 CAPSULE ORAL at 13:29

## 2023-12-28 RX ADMIN — ENOXAPARIN SODIUM 40 MG: 100 INJECTION SUBCUTANEOUS at 08:51

## 2023-12-28 RX ADMIN — DICYCLOMINE HYDROCHLORIDE 10 MG: 10 CAPSULE ORAL at 20:12

## 2023-12-28 NOTE — CARE COORDINATION
12/28/2023  Social Work Discharge [facility] accepts Pt and will start precert. ERINN,7000 and transport form are completed . Electronically signed by LAURA Gonzalez on 12/28/2023 at 9:04 AM    12/28/2023  Social Work Discharge Planning:Auth received for Pt to go to [facility]. Sw notified nurse. ERINN,7000 and transport form are completed. Auth is good thorugh 12/1. Electronically signed by LAURA Gonzalez on 12/28/2023 at 10:35 AM

## 2023-12-28 NOTE — DISCHARGE SUMMARY
CMP:    Lab Results   Component Value Date/Time     12/28/2023 02:42 AM    K 3.9 12/28/2023 02:42 AM    K 4.3 03/06/2022 04:59 PM     12/28/2023 02:42 AM    CO2 24 12/28/2023 02:42 AM    BUN 5 12/28/2023 02:42 AM    CREATININE 0.7 12/28/2023 02:42 AM    GFRAA >60 03/06/2022 04:59 PM    LABGLOM >60 12/28/2023 02:42 AM    GLUCOSE 95 12/28/2023 02:42 AM    GLUCOSE 69 06/04/2012 11:58 AM    PROT 6.2 12/28/2023 02:42 AM    LABALBU 3.2 12/28/2023 02:42 AM    LABALBU 3.9 06/04/2012 11:58 AM    CALCIUM 9.2 12/28/2023 02:42 AM    BILITOT 0.5 12/28/2023 02:42 AM    ALKPHOS 122 12/28/2023 02:42 AM     12/28/2023 02:42 AM     12/28/2023 02:42 AM     Magnesium:    Lab Results   Component Value Date/Time    MG 1.9 12/28/2023 02:42 AM     Phosphorus:    Lab Results   Component Value Date/Time    PHOS 3.1 12/28/2023 02:42 AM       Imaging:   CT HEAD WO CONTRAST   Final Result   No acute intracranial abnormality. CT ABDOMEN PELVIS W IV CONTRAST Additional Contrast? None   Final Result   Normal appearing liver. Normal appearance of the large and small bowel. US GALLBLADDER RUQ   Final Result   Coarsened heterogeneous liver parenchymal pattern. No liver masses or   perihepatic fluid. 9 mm simple right renal cyst.         XR CHEST PORTABLE   Final Result   No acute process.              Patient Instructions:      Medication List        CONTINUE taking these medications      ammonium lactate 12 % cream  Commonly known as: AMLACTIN     CALCIUM PLUS VITAMIN D PO     chlorhexidine 0.12 % solution  Commonly known as: PERIDEX     diclofenac sodium 1 % Gel  Commonly known as: VOLTAREN     dicyclomine 10 MG capsule  Commonly known as: BENTYL  Take 1 capsule by mouth 4 times daily     famotidine 40 MG tablet  Commonly known as: PEPCID     fluticasone 50 MCG/ACT nasal spray  Commonly known as: FLONASE     hydrOXYzine pamoate 25 MG capsule  Commonly known as: VISTARIL     mirabegron 25 MG

## 2023-12-28 NOTE — PLAN OF CARE
Problem: Discharge Planning  Goal: Discharge to home or other facility with appropriate resources  12/28/2023 0949 by Royer Henderson RN  Outcome: Progressing  12/27/2023 2355 by Danie Huggins RN  Outcome: Progressing     Problem: Safety - Adult  Goal: Free from fall injury  12/28/2023 0949 by Royer Henderson RN  Outcome: Progressing  12/27/2023 2355 by Danie Huggins RN  Outcome: Progressing  Flowsheets (Taken 12/27/2023 2355)  Free From Fall Injury: Instruct family/caregiver on patient safety     Problem: Skin/Tissue Integrity  Goal: Absence of new skin breakdown  Description: 1. Monitor for areas of redness and/or skin breakdown  2. Assess vascular access sites hourly  3. Every 4-6 hours minimum:  Change oxygen saturation probe site  4. Every 4-6 hours:  If on nasal continuous positive airway pressure, respiratory therapy assess nares and determine need for appliance change or resting period.   12/28/2023 0949 by Royer Henderson RN  Outcome: Progressing  12/27/2023 2355 by Danie Huggins RN  Outcome: Progressing     Problem: Pain  Goal: Verbalizes/displays adequate comfort level or baseline comfort level  12/28/2023 0949 by Royer Henderson RN  Outcome: Progressing  12/27/2023 2355 by Danie Huggins RN  Outcome: Progressing     Problem: Nutrition Deficit:  Goal: Optimize nutritional status  12/28/2023 0949 by Royer Henderson RN  Outcome: Progressing  12/27/2023 2355 by Danie Huggins RN  Outcome: Progressing     Problem: ABCDS Injury Assessment  Goal: Absence of physical injury  Outcome: Progressing

## 2023-12-28 NOTE — PLAN OF CARE
Problem: Discharge Planning  Goal: Discharge to home or other facility with appropriate resources  12/27/2023 2355 by Dank Jefferson RN  Outcome: Progressing  12/27/2023 1125 by Nadine Bloch, RN  Outcome: Progressing     Problem: Safety - Adult  Goal: Free from fall injury  12/27/2023 2355 by Dank Jefferson RN  Outcome: Progressing  Flowsheets (Taken 12/27/2023 2355)  Free From Fall Injury: Instruct family/caregiver on patient safety  12/27/2023 1125 by Nadine Bloch, RN  Outcome: Progressing     Problem: Skin/Tissue Integrity  Goal: Absence of new skin breakdown  Description: 1. Monitor for areas of redness and/or skin breakdown  2. Assess vascular access sites hourly  3. Every 4-6 hours minimum:  Change oxygen saturation probe site  4. Every 4-6 hours:  If on nasal continuous positive airway pressure, respiratory therapy assess nares and determine need for appliance change or resting period.   12/27/2023 2355 by Dank Jefferson RN  Outcome: Progressing  12/27/2023 1125 by Nadine Bloch, RN  Outcome: Progressing     Problem: Pain  Goal: Verbalizes/displays adequate comfort level or baseline comfort level  12/27/2023 2355 by Dank Jefferson RN  Outcome: Progressing  12/27/2023 1125 by Nadine Bloch, RN  Outcome: Progressing     Problem: Nutrition Deficit:  Goal: Optimize nutritional status  12/27/2023 2355 by Dank Jefferson RN  Outcome: Progressing  12/27/2023 1125 by Nadine Bloch, RN  Outcome: Progressing

## 2023-12-28 NOTE — CARE COORDINATION
Pt ok for discharge; spoke to [facility] at [address]; pt is not listed under [facility] dual for transportation benefits. Transport arranged via physician's ambulette for 8 pm tonite. Staff/pt/ facility aware. David Wade.

## 2023-12-29 LAB
IGG4 SER-MCNC: 18 MG/DL (ref 1–123)
MICROORGANISM/AGENT SPEC: NEGATIVE
SPECIMEN DESCRIPTION: NORMAL

## 2024-01-25 ENCOUNTER — OFFICE VISIT (OUTPATIENT)
Dept: ORTHOPEDIC SURGERY | Age: 79
End: 2024-01-25

## 2024-01-25 DIAGNOSIS — M17.12 PRIMARY OSTEOARTHRITIS OF LEFT KNEE: Primary | ICD-10-CM

## 2024-01-25 PROBLEM — E86.0 DEHYDRATION: Status: RESOLVED | Noted: 2023-12-26 | Resolved: 2024-01-25

## 2024-01-25 RX ORDER — BUPIVACAINE HYDROCHLORIDE 2.5 MG/ML
3 INJECTION, SOLUTION INFILTRATION; PERINEURAL ONCE
Status: COMPLETED | OUTPATIENT
Start: 2024-01-25 | End: 2024-01-25

## 2024-01-25 RX ORDER — AMLODIPINE BESYLATE AND BENAZEPRIL HYDROCHLORIDE 5; 20 MG/1; MG/1
CAPSULE ORAL
COMMUNITY
Start: 2024-01-15

## 2024-01-25 RX ORDER — TRIAMCINOLONE ACETONIDE 40 MG/ML
80 INJECTION, SUSPENSION INTRA-ARTICULAR; INTRAMUSCULAR ONCE
Status: COMPLETED | OUTPATIENT
Start: 2024-01-25 | End: 2024-01-25

## 2024-01-25 RX ADMIN — BUPIVACAINE HYDROCHLORIDE 7.5 MG: 2.5 INJECTION, SOLUTION INFILTRATION; PERINEURAL at 08:30

## 2024-01-25 RX ADMIN — TRIAMCINOLONE ACETONIDE 80 MG: 40 INJECTION, SUSPENSION INTRA-ARTICULAR; INTRAMUSCULAR at 08:30

## 2024-01-25 NOTE — PROGRESS NOTES
wounds, no erythema  Psych: normal affect; mood stable  Neurologic:  sensation grossly intact in extremities  MSK:        Lower Extremity:   Ipsilateral hip exam shows normal range of motion without pain with impingement testing.      Left knee: Fixed varus deformity without correction.  Range of motion is limited with crepitus from 0 to 90 degrees.  Tender palpation of the medial lateral patellofemoral joints.  Negative Lachman.  Negative posterior drawer.  Calf soft compressible         IMAGING:    XR: 4 views of the left bilateral knee show significant end-stage osteoarthritis with near fusion of her left knee with complete destruction of the joint space, severe osteophyte formation.  These were independently reviewed by myself  Right knee shows moderate to severe tricompartmental osteoarthritis with varus deformity subchondral sclerosis joint space narrowing and osteophyte formation.  All these images were independently interpreted by myself and discussed with the patient.    No new images today    ASSESSMENT  Bilateral knee osteoarthritis    PLAN  -Knee injection provided today.  She is going to call when she would like an injection on the right side.  Follow-up as needed    Left knee Steroid Injection    The left knee identified as the injection site. The risk and benefits of a cortisone injection were explained and the patient consented to the injection. Under sterile conditions,the left  knee was injected with a mixture of 80  mg of Kenalog and 3 mL of 0.25% Marcaine without complication. A sterile bandage was applied.             Jarred Hylton DO   Orthopaedic Surgery   1/25/24  8:29 AM    Note: This report was completed using computer[employer]enic Dental voiced recognition software.  Every effort has been made to ensure accuracy; however, inadvertent computerized transcription errors may be present.

## 2024-02-22 ENCOUNTER — OFFICE VISIT (OUTPATIENT)
Dept: ORTHOPEDIC SURGERY | Age: 79
End: 2024-02-22
Payer: MEDICARE

## 2024-02-22 DIAGNOSIS — M19.132 SLAC (SCAPHOLUNATE ADVANCED COLLAPSE) OF WRIST, LEFT: ICD-10-CM

## 2024-02-22 DIAGNOSIS — M25.532 LEFT WRIST PAIN: Primary | ICD-10-CM

## 2024-02-22 PROCEDURE — 99213 OFFICE O/P EST LOW 20 MIN: CPT | Performed by: STUDENT IN AN ORGANIZED HEALTH CARE EDUCATION/TRAINING PROGRAM

## 2024-02-22 PROCEDURE — 1090F PRES/ABSN URINE INCON ASSESS: CPT | Performed by: STUDENT IN AN ORGANIZED HEALTH CARE EDUCATION/TRAINING PROGRAM

## 2024-02-22 PROCEDURE — G8417 CALC BMI ABV UP PARAM F/U: HCPCS | Performed by: STUDENT IN AN ORGANIZED HEALTH CARE EDUCATION/TRAINING PROGRAM

## 2024-02-22 PROCEDURE — 20605 DRAIN/INJ JOINT/BURSA W/O US: CPT | Performed by: STUDENT IN AN ORGANIZED HEALTH CARE EDUCATION/TRAINING PROGRAM

## 2024-02-22 PROCEDURE — G8484 FLU IMMUNIZE NO ADMIN: HCPCS | Performed by: STUDENT IN AN ORGANIZED HEALTH CARE EDUCATION/TRAINING PROGRAM

## 2024-02-22 PROCEDURE — 1036F TOBACCO NON-USER: CPT | Performed by: STUDENT IN AN ORGANIZED HEALTH CARE EDUCATION/TRAINING PROGRAM

## 2024-02-22 PROCEDURE — 1123F ACP DISCUSS/DSCN MKR DOCD: CPT | Performed by: STUDENT IN AN ORGANIZED HEALTH CARE EDUCATION/TRAINING PROGRAM

## 2024-02-22 PROCEDURE — G8399 PT W/DXA RESULTS DOCUMENT: HCPCS | Performed by: STUDENT IN AN ORGANIZED HEALTH CARE EDUCATION/TRAINING PROGRAM

## 2024-02-22 PROCEDURE — G8427 DOCREV CUR MEDS BY ELIG CLIN: HCPCS | Performed by: STUDENT IN AN ORGANIZED HEALTH CARE EDUCATION/TRAINING PROGRAM

## 2024-02-22 RX ORDER — BUPIVACAINE HYDROCHLORIDE 2.5 MG/ML
0.5 INJECTION, SOLUTION INFILTRATION; PERINEURAL ONCE
Status: COMPLETED | OUTPATIENT
Start: 2024-02-22 | End: 2024-02-22

## 2024-02-22 RX ORDER — TRIAMCINOLONE ACETONIDE 40 MG/ML
20 INJECTION, SUSPENSION INTRA-ARTICULAR; INTRAMUSCULAR ONCE
Status: COMPLETED | OUTPATIENT
Start: 2024-02-22 | End: 2024-02-22

## 2024-02-22 RX ADMIN — TRIAMCINOLONE ACETONIDE 20 MG: 40 INJECTION, SUSPENSION INTRA-ARTICULAR; INTRAMUSCULAR at 14:43

## 2024-02-22 RX ADMIN — BUPIVACAINE HYDROCHLORIDE 1.25 MG: 2.5 INJECTION, SOLUTION INFILTRATION; PERINEURAL at 14:42
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
## 2024-07-11 ENCOUNTER — OFFICE VISIT (OUTPATIENT)
Dept: ORTHOPEDIC SURGERY | Age: 79
End: 2024-07-11
Payer: MEDICARE

## 2024-07-11 DIAGNOSIS — M17.12 PRIMARY OSTEOARTHRITIS OF LEFT KNEE: Primary | ICD-10-CM

## 2024-07-11 PROCEDURE — G8399 PT W/DXA RESULTS DOCUMENT: HCPCS | Performed by: STUDENT IN AN ORGANIZED HEALTH CARE EDUCATION/TRAINING PROGRAM

## 2024-07-11 PROCEDURE — 1090F PRES/ABSN URINE INCON ASSESS: CPT | Performed by: STUDENT IN AN ORGANIZED HEALTH CARE EDUCATION/TRAINING PROGRAM

## 2024-07-11 PROCEDURE — G8417 CALC BMI ABV UP PARAM F/U: HCPCS | Performed by: STUDENT IN AN ORGANIZED HEALTH CARE EDUCATION/TRAINING PROGRAM

## 2024-07-11 PROCEDURE — 20610 DRAIN/INJ JOINT/BURSA W/O US: CPT | Performed by: STUDENT IN AN ORGANIZED HEALTH CARE EDUCATION/TRAINING PROGRAM

## 2024-07-11 PROCEDURE — 99213 OFFICE O/P EST LOW 20 MIN: CPT | Performed by: STUDENT IN AN ORGANIZED HEALTH CARE EDUCATION/TRAINING PROGRAM

## 2024-07-11 PROCEDURE — 1036F TOBACCO NON-USER: CPT | Performed by: STUDENT IN AN ORGANIZED HEALTH CARE EDUCATION/TRAINING PROGRAM

## 2024-07-11 PROCEDURE — G8427 DOCREV CUR MEDS BY ELIG CLIN: HCPCS | Performed by: STUDENT IN AN ORGANIZED HEALTH CARE EDUCATION/TRAINING PROGRAM

## 2024-07-11 PROCEDURE — 1123F ACP DISCUSS/DSCN MKR DOCD: CPT | Performed by: STUDENT IN AN ORGANIZED HEALTH CARE EDUCATION/TRAINING PROGRAM

## 2024-07-11 RX ORDER — TRIAMCINOLONE ACETONIDE 40 MG/ML
80 INJECTION, SUSPENSION INTRA-ARTICULAR; INTRAMUSCULAR ONCE
Status: COMPLETED | OUTPATIENT
Start: 2024-07-11 | End: 2024-07-11

## 2024-07-11 RX ORDER — BUPIVACAINE HYDROCHLORIDE 2.5 MG/ML
3 INJECTION, SOLUTION INFILTRATION; PERINEURAL ONCE
Status: COMPLETED | OUTPATIENT
Start: 2024-07-11 | End: 2024-07-11

## 2024-07-11 RX ADMIN — TRIAMCINOLONE ACETONIDE 80 MG: 40 INJECTION, SUSPENSION INTRA-ARTICULAR; INTRAMUSCULAR at 09:13

## 2024-07-11 RX ADMIN — BUPIVACAINE HYDROCHLORIDE 7.5 MG: 2.5 INJECTION, SOLUTION INFILTRATION; PERINEURAL at 09:12

## 2024-07-11 NOTE — PROGRESS NOTES
distress.   HEENT: head is normocephalic, atraumatic.  EOMI.   Neck: supple, trachea midline, no thyromegaly   Cardiovascular: peripheral pulses palpable.  Normal Capillary refill   Respiratory: breathing unlabored, chest expansion symmetric   Skin: no rash, no open wounds, no erythema  Psych: normal affect; mood stable  Neurologic:  sensation grossly intact in extremities  MSK:        Lower Extremity:   Ipsilateral hip exam shows normal range of motion without pain with impingement testing.      Left knee: Fixed varus deformity without correction.  Range of motion is limited with crepitus from 0 to 90 degrees.  Tender palpation of the medial lateral patellofemoral joints.  Negative Lachman.  Negative posterior drawer.  Calf soft compressible         IMAGING:    XR: 4 views of the left bilateral knee show significant end-stage osteoarthritis with near fusion of her left knee with complete destruction of the joint space, severe osteophyte formation.  These were independently reviewed by myself  Right knee shows moderate to severe tricompartmental osteoarthritis with varus deformity subchondral sclerosis joint space narrowing and osteophyte formation.  All these images were independently interpreted by myself and discussed with the patient.    No new images today    ASSESSMENT  Bilateral knee osteoarthritis    PLAN  -Left knee injection provided today.  Her right knee is currently not bothering her.  We talked about activity modification and fall prevention.  Recommend continuing to use her walker to prevent osteoporotic fractures.  We also discussed low impact exercise and strengthening to help with her ambulatory function.  She is going to follow-up on an as-needed basis    Left knee Steroid Injection    The left knee identified as the injection site. The risk and benefits of a cortisone injection were explained and the patient consented to the injection. Under sterile conditions,the left  knee was injected with a

## 2024-10-23 ENCOUNTER — OFFICE VISIT (OUTPATIENT)
Dept: ORTHOPEDIC SURGERY | Age: 79
End: 2024-10-23

## 2024-10-23 DIAGNOSIS — M17.12 PRIMARY OSTEOARTHRITIS OF LEFT KNEE: ICD-10-CM

## 2024-10-23 DIAGNOSIS — M25.512 LEFT SHOULDER PAIN, UNSPECIFIED CHRONICITY: Primary | ICD-10-CM

## 2024-10-23 RX ORDER — BUPIVACAINE HYDROCHLORIDE 2.5 MG/ML
2 INJECTION, SOLUTION INFILTRATION; PERINEURAL ONCE
Status: COMPLETED | OUTPATIENT
Start: 2024-10-23 | End: 2024-10-23

## 2024-10-23 RX ORDER — TRIAMCINOLONE ACETONIDE 40 MG/ML
80 INJECTION, SUSPENSION INTRA-ARTICULAR; INTRAMUSCULAR ONCE
Status: COMPLETED | OUTPATIENT
Start: 2024-10-23 | End: 2024-10-23

## 2024-10-23 RX ORDER — TRIAMCINOLONE ACETONIDE 40 MG/ML
40 INJECTION, SUSPENSION INTRA-ARTICULAR; INTRAMUSCULAR ONCE
Status: COMPLETED | OUTPATIENT
Start: 2024-10-23 | End: 2024-10-23

## 2024-10-23 RX ORDER — BUPIVACAINE HYDROCHLORIDE 2.5 MG/ML
3 INJECTION, SOLUTION INFILTRATION; PERINEURAL ONCE
Status: COMPLETED | OUTPATIENT
Start: 2024-10-23 | End: 2024-10-23

## 2024-10-23 RX ADMIN — TRIAMCINOLONE ACETONIDE 40 MG: 40 INJECTION, SUSPENSION INTRA-ARTICULAR; INTRAMUSCULAR at 09:46

## 2024-10-23 RX ADMIN — BUPIVACAINE HYDROCHLORIDE 5 MG: 2.5 INJECTION, SOLUTION INFILTRATION; PERINEURAL at 09:45

## 2024-10-23 RX ADMIN — TRIAMCINOLONE ACETONIDE 80 MG: 40 INJECTION, SUSPENSION INTRA-ARTICULAR; INTRAMUSCULAR at 09:46

## 2024-10-23 RX ADMIN — BUPIVACAINE HYDROCHLORIDE 7.5 MG: 2.5 INJECTION, SOLUTION INFILTRATION; PERINEURAL at 09:46

## 2024-10-23 NOTE — PROGRESS NOTES
CHIEF COMPLAINT:   Chief Complaint   Patient presents with    Follow-up     Left shoulder pain, denies injury, she would like an injection today in the left shoulder and left knee        HPI update 10/23/2024: Sindy was last seen in July for left knee injection which provided good relief.  She would like to repeat this today.  She does complain of left shoulder pain.  This is worse activity is better with rest.  It is worse with overhead activity specifically.  Pain is sharp and nonradiating and located circumferentially around her shoulder.  Denies any recent injuries.    HPI update 7/11/2024.  Sindy is here today complaining of left knee pain.  Her most recent injection in her knee was in January.  She has responded well to these in the past.  She is having increasing pain and using a walker with frequent falls.  Denies any fevers or chills.  Denies numbness tingling.    HPI update 1/25/2024: She is here today for repeat steroid injection in her left knee.  She got good relief after her last injection in August.  Recently she has been in a nursing home for frequent falls.  Her knee is flared up since that time.  Currently she is back home doing therapy.  She denies fever and chills.  Denies numbness tingling.    HPI update 8/30/2023: Sindy is here today for repeat injection in her left knee today.  Her last visit we injected both of her knees.  She denies any recent injuries.  She still uses a walker.    PI update 5/10/2023: Patient is here today with severe osteoarthritis in both of her knees.  She had good results from her last steroid injection is here today for repeat injections in both of her knees.  She still uses a walker.  She has not had any changes recently.  Denies fever and chills.  Denies numbness tingling.    HPI:    Candace Garcia is a 79 y.o. year old female with severe left knee osteoarthritis.  At her last visit in December she received a knee steroid injection which is working well and

## 2024-10-23 NOTE — PATIENT INSTRUCTIONS
your back by the wrist. Pull your arm up gently to stretch your shoulder.  Advanced stretch: Put a towel over your other shoulder. Put the hand of your injured arm behind your back. Now hold the back end of the towel. With the other hand, hold the front end of the towel in front of your body. Pull gently on the front end of the towel. This will bring your hand farther up your back to stretch your shoulder.  Overhead stretch  Standing about an arm's length away, grasp onto a solid surface. You could use a countertop, a doorknob, or the back of a sturdy chair.  With your knees slightly bent, bend forward with your arms straight. Lower your upper body, and let your shoulders stretch.  As your shoulders are able to stretch farther, you may need to take a step or two backward.  Hold for at least 15 to 30 seconds. Then stand up and relax. If you had stepped back during your stretch, step forward so you can keep your hands on the solid surface.  Repeat 2 to 4 times.  Shoulder flexion (lying down)  To make a wand for this exercise, use a piece of PVC pipe or a broom handlewith the broom removed. Make the wand about a foot wider than your shoulders.  Lie on your back, holding a wand with both hands. Your palms should face down as you hold the wand.  Keeping your elbows straight, slowly raise your arms over your head. Raise them until you feel a stretch in your shoulders, upper back, and chest.  Hold for 15 to 30 seconds.  Repeat 2 to 4 times.  Shoulder rotation (lying down)  To make a wand for this exercise, use a piece of PVC pipe or a broom handlewith the broom removed. Make the wand about a foot wider than your shoulders.  Lie on your back. Hold a wand with both hands with your elbows bent and palms up.  Keep your elbows close to your body, and move the wand across your body toward the sore arm.  Hold for 8 to 12 seconds.  Repeat 2 to 4 times.  Wall climbing (to the side)  Avoid any movement that is straight to your side,

## 2025-01-02 ENCOUNTER — TELEPHONE (OUTPATIENT)
Dept: CARDIOLOGY | Age: 80
End: 2025-01-02

## 2025-01-02 ENCOUNTER — OFFICE VISIT (OUTPATIENT)
Dept: CARDIOLOGY CLINIC | Age: 80
End: 2025-01-02

## 2025-01-02 VITALS
RESPIRATION RATE: 17 BRPM | HEART RATE: 85 BPM | TEMPERATURE: 97.1 F | WEIGHT: 164 LBS | HEIGHT: 62 IN | BODY MASS INDEX: 30.18 KG/M2 | SYSTOLIC BLOOD PRESSURE: 122 MMHG | DIASTOLIC BLOOD PRESSURE: 66 MMHG

## 2025-01-02 DIAGNOSIS — R06.02 SHORTNESS OF BREATH: ICD-10-CM

## 2025-01-02 DIAGNOSIS — E78.49 OTHER HYPERLIPIDEMIA: ICD-10-CM

## 2025-01-02 DIAGNOSIS — I10 PRIMARY HYPERTENSION: ICD-10-CM

## 2025-01-02 DIAGNOSIS — E66.811 OBESITY (BMI 30.0-34.9): ICD-10-CM

## 2025-01-02 DIAGNOSIS — I50.9 ACUTE DECOMPENSATED HEART FAILURE (HCC): Primary | ICD-10-CM

## 2025-01-02 RX ORDER — POTASSIUM CHLORIDE 750 MG/1
10 TABLET, EXTENDED RELEASE ORAL DAILY
COMMUNITY
Start: 2024-12-30

## 2025-01-02 RX ORDER — FUROSEMIDE 20 MG/1
20 TABLET ORAL DAILY
COMMUNITY
Start: 2024-12-30 | End: 2025-01-02

## 2025-01-02 RX ORDER — FUROSEMIDE 40 MG/1
40 TABLET ORAL DAILY
Qty: 60 TABLET | Refills: 1 | Status: SHIPPED | OUTPATIENT
Start: 2025-01-02

## 2025-01-02 RX ORDER — ROSUVASTATIN CALCIUM 40 MG/1
40 TABLET, COATED ORAL EVERY EVENING
COMMUNITY
Start: 2024-12-30

## 2025-01-02 RX ORDER — MONTELUKAST SODIUM 10 MG/1
10 TABLET ORAL
COMMUNITY

## 2025-01-02 NOTE — TELEPHONE ENCOUNTER
Called patient to schedule echo, unable to l/m due to \"patient's voicemail is not accepting messages at this time\"    Electronically signed by Lacy Arreola on 1/2/2025 at 1:42 PM

## 2025-01-02 NOTE — PATIENT INSTRUCTIONS
Continue all your medications at current doses.   Increase lasix to 40 mg daily.   Please check blood work (lipid panel, cmp) in 1 week.   We will schedule an echo  Restrict sodium intake to less than 2-2.5 g/day. Restrict fluid intake to less than 2.2 L/day. Goal BP is less than 130/80.   If your weight increases by > 2 lbs in a day or >5 lbs in more than a day, take an extra half lasix pill.   Please try to exercise for 150 minutes a week.   I will see you back in the office in 6 months. Please call the office at ([phone]) if you have any questions.

## 2025-01-02 NOTE — PROGRESS NOTES
97.1 °F (36.2 °C) (Infrared)   Resp 17   Ht 1.575 m (5' 2\")   Wt 74.4 kg (164 lb)   BMI 30.00 kg/m²   Constitutional: Oriented to person, place, and time. Well-developed and well-nourished. No distress.    Head: Normocephalic and atraumatic.   Eyes: EOM are normal. Pupils are equal, round, and reactive to light.   Neck: Normal range of motion. Neck supple.  JVP elevated at 14 cm. Carotid bruit is not present. No tracheal deviation present. No thyromegaly present.   Cardiovascular: Normal rate, regular rhythm, normal heart sounds and intact distal pulses.  Exam reveals no gallop and no friction rub.  No murmur heard.  Pulmonary/Chest: Effort normal and breath sounds normal. No respiratory distress. No wheezes. No rales. No tenderness.   Abdominal: Soft. Bowel sounds are normal. No distension and no mass. No tenderness. No rebound and no guarding.   Musculoskeletal: Normal range of motion. No edema and no tenderness.   Lymphadenopathy:   No cervical adenopathy.   Neurological: Alert and oriented to person, place, and time.   Skin: Skin is warm and dry. No rash noted. Not diaphoretic. No erythema.   Psychiatric: Normal mood and affect. Behavior is normal.     Procedures and Testing  EKG: Reviewed.  Shows sinus rhythm.  Left atrial enlargement.  LVH with secondary repolarization abnormality    ASSESSMENT:   Diagnosis Orders   1. Acute decompensated heart failure (HCC)  Lipid Panel    Comprehensive Metabolic Panel      2. Primary hypertension  EKG 12 lead      3. Shortness of breath  Echo (TTE) Complete      4. Other hyperlipidemia        5. Obesity (BMI 30.0-34.9)             Plan:  1.  Acute decompensated heart failure-heart failure preserved ejection fraction: Echocardiogram from 2023 reviewed.  Appears hypervolemic.  Increase Lasix to 40 mg daily.  Check CMP in 1 week.  History of elevated transaminases.  No labs checked since December 2023.  History of alcohol abuse.  Ultrasound imaging suggestive of coarse

## 2025-01-10 ENCOUNTER — TELEPHONE (OUTPATIENT)
Dept: ADMINISTRATIVE | Age: 80
End: 2025-01-10

## 2025-01-10 NOTE — TELEPHONE ENCOUNTER
Patient is calling to give an update to Dr. Dozier regarding Furosemide. She had she is still having the same symptoms and the swelling around her ankles and knees has stayed the same - has not gotten worse.    Please advise patient.

## 2025-02-22 ENCOUNTER — HOSPITAL ENCOUNTER (EMERGENCY)
Age: 80
Discharge: HOME | End: 2025-02-22
Attending: STUDENT IN AN ORGANIZED HEALTH CARE EDUCATION/TRAINING PROGRAM
Payer: MEDICARE

## 2025-02-22 ENCOUNTER — APPOINTMENT (OUTPATIENT)
Dept: GENERAL RADIOLOGY | Age: 80
End: 2025-02-22
Payer: MEDICARE

## 2025-02-22 ENCOUNTER — APPOINTMENT (OUTPATIENT)
Dept: CT IMAGING | Age: 80
End: 2025-02-22
Payer: MEDICARE

## 2025-02-22 VITALS
TEMPERATURE: 97.6 F | RESPIRATION RATE: 16 BRPM | OXYGEN SATURATION: 98 % | HEIGHT: 62 IN | SYSTOLIC BLOOD PRESSURE: 156 MMHG | BODY MASS INDEX: 30.36 KG/M2 | DIASTOLIC BLOOD PRESSURE: 69 MMHG | WEIGHT: 165 LBS | HEART RATE: 73 BPM

## 2025-02-22 DIAGNOSIS — S09.90XA CLOSED HEAD INJURY, INITIAL ENCOUNTER: Primary | ICD-10-CM

## 2025-02-22 PROCEDURE — 73080 X-RAY EXAM OF ELBOW: CPT

## 2025-02-22 PROCEDURE — 70450 CT HEAD/BRAIN W/O DYE: CPT

## 2025-02-22 PROCEDURE — 71046 X-RAY EXAM CHEST 2 VIEWS: CPT

## 2025-02-22 PROCEDURE — 72125 CT NECK SPINE W/O DYE: CPT

## 2025-02-22 PROCEDURE — 72170 X-RAY EXAM OF PELVIS: CPT

## 2025-02-22 PROCEDURE — 99284 EMERGENCY DEPT VISIT MOD MDM: CPT

## 2025-02-22 PROCEDURE — 73562 X-RAY EXAM OF KNEE 3: CPT

## 2025-02-22 ASSESSMENT — PAIN DESCRIPTION - LOCATION
LOCATION: HEAD
LOCATION: HEAD

## 2025-02-22 ASSESSMENT — PAIN DESCRIPTION - PAIN TYPE: TYPE: ACUTE PAIN

## 2025-02-22 ASSESSMENT — PAIN DESCRIPTION - FREQUENCY: FREQUENCY: CONTINUOUS

## 2025-02-22 ASSESSMENT — PAIN SCALES - GENERAL
PAINLEVEL_OUTOF10: 8
PAINLEVEL_OUTOF10: 8

## 2025-02-22 ASSESSMENT — PAIN - FUNCTIONAL ASSESSMENT
PAIN_FUNCTIONAL_ASSESSMENT: 0-10
PAIN_FUNCTIONAL_ASSESSMENT: 0-10

## 2025-02-22 ASSESSMENT — PAIN DESCRIPTION - ONSET: ONSET: ON-GOING

## 2025-02-22 ASSESSMENT — PAIN DESCRIPTION - DESCRIPTORS: DESCRIPTORS: DISCOMFORT

## 2025-02-22 NOTE — DISCHARGE INSTRUCTIONS
Return to emergency department if you have a sudden extreme headache, facial droop or loss of consciousness

## 2025-02-22 NOTE — ED PROVIDER NOTES
[facility] EMERGENCY DEPARTMENT  EMERGENCY DEPARTMENT ENCOUNTER        Pt Name: Candace Garcia  MRN: [id number]  Birthdate 1945  Date of evaluation: 2/22/2025  Provider: Eldon Spence DO  PCP: Magnus Almanza APRN - CNP  Note Started: 12:55 PM EST 2/22/25    CHIEF COMPLAINT       Chief Complaint   Patient presents with    Fall     fall- today tripped in parking lot, hit head, no loc, no thinners; R elbow/R knee pain, head pain (L side)    Head Injury    Elbow Injury     Right elbow    Knee Injury     Left knee       HISTORY OF PRESENT ILLNESS: 1 or more Elements   History From: PATIENT     Limitations to history : None    Candace Garcia is a 79 y.o. female arriving at her experiencing a mechanical fall in a parking lot at a restaurant.  Patient states she got out of the vehicle and that her knees buckled and she fell hitting the left side of her head and striking her right elbow and left knee with some associated pain.  She has ambulated since the fall.  She is not on a blood thinner.  She states that she has some chronic knee pain and arthritis.  She did not lose consciousness.      Nursing Notes were all reviewed and agreed with or any disagreements were addressed in the HPI.    REVIEW OF SYSTEMS :      Review of Systems    POSITIVE (+): Joint pain  NEGATIVE (-): Fevers, chills, nausea, vomiting, diarrhea, constipation, abdominal pain    SURGICAL HISTORY     Past Surgical History:   Procedure Laterality Date    ROTATOR CUFF REPAIR Bilateral     TOTAL HIP ARTHROPLASTY Right 8/17/2021    HIP TOTAL ARTHROPLASTY----KANCHAN performed by Darshan Duron MD at [facility] [place] [place]       [facility]       Discharge Medication List as of 2/22/2025  3:30 PM        CONTINUE these medications which have NOT CHANGED    Details   potassium chloride (KLOR-CON) 10 MEQ extended release tablet Take 1 tablet by mouth dailyHistorical Med      rosuvastatin (CRESTOR) 40 MG tablet Take 1 tablet by mouth  but occasionally words are mis-transcribed.)    Eldon Spence, DO PGY-2             none

## 2025-02-22 NOTE — DISCHARGE INSTR - COC
Continuity of Care Form    Patient Name: Candace Garcia   :  1945  MRN:  [id number]    Admit date:  2025  Discharge date:  ***    Code Status Order: Prior   Advance Directives:   Advance Care Flowsheet Documentation             Admitting Physician:  No admitting provider for patient encounter.  PCP: Magnus Almanza APRN - CNP    Discharging Nurse: ***  Discharging Hospital Unit/Room#: DISPO/D01  Discharging Unit Phone Number: ***    Emergency Contact:   Extended Emergency Contact Information  Primary Emergency Contact: jimmie stephen  Home Phone: [phone]  Relation: Child  Secondary Emergency Contact: Asa Weaver  Mobile Phone: [phone]  Relation: Child    Past Surgical History:  Past Surgical History:   Procedure Laterality Date    ROTATOR CUFF REPAIR Bilateral     TOTAL HIP ARTHROPLASTY Right 2021    HIP TOTAL ARTHROPLASTY----KANCHAN performed by Darshan Duron MD at [facility] [place]       Immunization History:   Immunization History   Administered Date(s) Administered    COVID-19, MODERNA, (age 12y+), IM, 50mcg/0.5mL 2024    COVID-19, PFIZER PURPLE top, DILUTE for use, (age 12 y+), 30mcg/0.3mL 2022       Active Problems:  Patient Active Problem List   Diagnosis Code    Primary osteoarthritis of right hip M16.11    Osteoarthritis of right hip M16.11    Intractable nausea and vomiting R11.2    Elevated LFTs R79.89    Primary hypertension I10    Hypoalbuminemia E88.09    Other hyperlipidemia E78.49    Obesity (BMI 30.0-34.9) E66.811       Isolation/Infection:   Isolation            No Isolation          Patient Infection Status       None to display                     Nurse Assessment:  Last Vital Signs: BP (!) 156/69   Pulse 73   Temp 97.6 °F (36.4 °C) (Oral)   Resp 16   Ht 1.575 m (5' 2\")   Wt 74.8 kg (165 lb)   SpO2 98%   BMI 30.18 kg/m²     Last documented pain score (0-10 scale): Pain Level: 8  Last Weight:   Wt Readings from Last 1 Encounters:   25 74.8 kg (165

## 2025-02-28 ENCOUNTER — HOSPITAL ENCOUNTER (OUTPATIENT)
Dept: PHYSICAL THERAPY | Age: 80
Setting detail: THERAPIES SERIES
Discharge: HOME | End: 2025-02-28
Payer: MEDICARE

## 2025-02-28 PROCEDURE — 97161 PT EVAL LOW COMPLEX 20 MIN: CPT | Performed by: PHYSICAL THERAPIST

## 2025-02-28 ASSESSMENT — PAIN DESCRIPTION - LOCATION: LOCATION: KNEE

## 2025-02-28 ASSESSMENT — PAIN DESCRIPTION - PAIN TYPE: TYPE: CHRONIC PAIN

## 2025-02-28 ASSESSMENT — PAIN DESCRIPTION - ORIENTATION: ORIENTATION: RIGHT;LEFT

## 2025-02-28 ASSESSMENT — PAIN DESCRIPTION - DESCRIPTORS: DESCRIPTORS: ACHING;BURNING

## 2025-02-28 ASSESSMENT — PAIN SCALES - GENERAL: PAINLEVEL_OUTOF10: 8

## 2025-02-28 NOTE — PROGRESS NOTES
Physical Therapy: Initial Evaluation    Patient: Candace Garcia (79 y.o. female)   Examination Date: 2025  Plan of Care Certification Period: 2025 to        :  1945 ;    Confirmed: Yes MRN: [id number]  CSN: [id number]   Insurance: Payor: [facility] MEDICARE / Plan: [employer] DUAL COMPLETE / Product Type: *No Product type* /   Insurance ID: [id number] - (Medicare Managed) Secondary Insurance (if applicable): [place]ICAID [place]   Referring Physician: Magnus Almanza APRN - *     PCP: Magnus Almanza APRN - CNP Visits to Date/Visits Approved:     No Show/Cancelled Appts:   /       Medical Diagnosis: Difficulty in walking, not elsewhere classified [R26.2]    Treatment Diagnosis:       PERTINENT MEDICAL HISTORY           Medical History: Chart Reviewed: Yes   Past Medical History:   Diagnosis Date    Arthritis     Hypertension      Surgical History:   Past Surgical History:   Procedure Laterality Date    ROTATOR CUFF REPAIR Bilateral     TOTAL HIP ARTHROPLASTY Right 2021    HIP TOTAL ARTHROPLASTY----KANCHAN performed by Darshan Duron MD at [facility] [place]       Medications:   Current Outpatient Medications:     potassium chloride (KLOR-CON) 10 MEQ extended release tablet, Take 1 tablet by mouth daily, Disp: , Rfl:     rosuvastatin (CRESTOR) 40 MG tablet, Take 1 tablet by mouth every evening, Disp: , Rfl:     montelukast (SINGULAIR) 10 MG tablet, Take 1 tablet by mouth Every Day, Disp: , Rfl:     furosemide (LASIX) 40 MG tablet, Take 1 tablet by mouth daily, Disp: 60 tablet, Rfl: 1    amLODIPine-benazepril (LOTREL) 5-20 MG per capsule, , Disp: , Rfl:     sertraline (ZOLOFT) 100 MG tablet, Take 1.5 mg by mouth daily, Disp: , Rfl:     mirabegron (MYRBETRIQ) 25 MG TB24, Take 1 tablet by mouth daily, Disp: , Rfl:     diclofenac sodium (VOLTAREN) 1 % GEL, Apply 4 g topically 4 times daily as needed for Pain, Disp: , Rfl:     fluticasone (FLONASE) 50 MCG/ACT nasal spray, 1 spray by Each Nostril route daily,

## 2025-02-28 NOTE — PROGRESS NOTES
[facility]                Phone: [phone]   Fax: [phone]    Physical Therapy Daily Treatment Note  Date:  2025    Patient Name:  Candace Garcia    :  1945  MRN: [id number]    Evaluating therapist:  CHEN Girard        (25)  Restrictions/Precautions:    Diagnosis:  gait dysfunction  Treatment Diagnosis:    Insurance/Certification information:  [facility] Medicare Dual Complete    cert dates:  25  to  25             ICD-10:  R26.2  Referring Practitioner:  BRAYDEN Almanza    Plan of care signed (Y/N):  Y  Visit# / total visits:  -  Pain level: 8/10   Time In:  Time Out:    Subjective:      Exercises:  Exercise/Equipment Resistance/Repetitions Other comments   StepOne              seated hip add                       abd                       flex                 knee ext            sit/stand            toe raises     step ups                                                               Other Therapeutic Activities:      Home Exercise Program:  provided 25    Manual Treatments:      Modalities:      Timed Code Treatment Minutes:      Total Treatment Minutes:      Treatment/Activity Tolerance:  [] Patient tolerated treatment well [] Patient limited by fatique  [] Patient limited by pain  [] Patient limited by other medical complications  [] Other:     Prognosis: [] Good [] Fair  [] Poor    Patient Requires Follow-up: [] Yes  [] No    Plan:   [] Continue per plan of care [] Alter current plan (see comments)  [] Plan of care initiated [] Hold pending MD visit [] Discharge  Plan for Next Session:      See Weekly Progress Note: []  Yes  []  No  Next due:        Electronically signed by:  Wilver Tomlin PT

## 2025-03-05 ENCOUNTER — HOSPITAL ENCOUNTER (OUTPATIENT)
Dept: PHYSICAL THERAPY | Age: 80
Setting detail: THERAPIES SERIES
End: 2025-03-05
Payer: MEDICARE

## 2025-03-07 ENCOUNTER — HOSPITAL ENCOUNTER (OUTPATIENT)
Dept: PHYSICAL THERAPY | Age: 80
Setting detail: THERAPIES SERIES
Discharge: HOME | End: 2025-03-07
Payer: MEDICARE

## 2025-03-07 PROCEDURE — G0283 ELEC STIM OTHER THAN WOUND: HCPCS

## 2025-03-07 PROCEDURE — 97110 THERAPEUTIC EXERCISES: CPT

## 2025-03-07 NOTE — PROGRESS NOTES
[facility]                Phone: [phone]   Fax: [phone]    Physical Therapy Daily Treatment Note      Date:  3/7/2025    Patient Name:  Candace Garcia   :  1945 MRN: [id number]    Evaluating therapist:  CHEN Girard      25  Referring Practitioner:  BRAYDEN Almanza    Diagnosis:  Gait dysfunction  Restrictions/Precautions:    Insurance/Certification:  [facility] Medicare Dual Complete      Cert. dates:  25  to  25              Plan of care signed (Y/N):  Y  Visit# / total visits:    -8  Pain level:  8/10     Time In:     1016  Time Out:  1119    Subjective:   Patient presents for first scheduled treatment session following initial evaluation.   She reports pain  8/10  in LEs and B knees.      Exercises:  Exercise/Equipment Resistance/Repetitions Comments   StepOne   7 min L1           Sit < > stand x10  green chair + 2\" foam           Calf raises 2 x10   floor         Step ups x10  4\" ea L/R         Standing march x15             Seated hip add  2 x15    ball                       abd 2 x15    otb           Seated hip flex 2 x10 ea L/R    LAQ  2 x10 ea L/R                    Objective:   Ther. exercise/activity as listed per flow sheet above.    Treatment completed with MH/PreMod to B knee x 15 minutes.      Assessment:   Patient performs exercises/activities with good effort and pacing.   No increased pain reported following session.     Evaluation Findings:   c/o weakness B LE with decreased balance for ~ 4 months of insidious onset   c/o pain across B knees with all prolonged activities, 8/10   PMH for L TIP and B RTC repairs   AROM WFL/strength grossly 3+, 4/5 for all ranges BL LE     Gait (w/ rolling walker): slow/guarded sera with short/equal strides and heel-toe progression noted B LE's   Static/dynamic balance is FAIR+   Endurance FAIR+ for all prolonged activities       Goals: (3-4 wks)  Increase strength across B LE's to grossly 4 to 4+/5 for all

## 2025-03-10 ENCOUNTER — HOSPITAL ENCOUNTER (OUTPATIENT)
Dept: PHYSICAL THERAPY | Age: 80
Setting detail: THERAPIES SERIES
Discharge: HOME | End: 2025-03-10
Payer: MEDICARE

## 2025-03-10 PROCEDURE — 97110 THERAPEUTIC EXERCISES: CPT

## 2025-03-10 NOTE — PROGRESS NOTES
[facility]                Phone: [phone]   Fax: [phone]    Physical Therapy Daily Treatment Note      Date:  3/10/2025    Patient Name:  Candace Garcia   :  1945 MRN: [id number]    Evaluating therapist:  CHEN Girard      25  Referring Practitioner:  BRAYDEN Almanza    Diagnosis:  Gait dysfunction  Restrictions/Precautions:    Insurance/Certification:  [facility] Medicare Dual Complete      Cert. dates:  25  to  25              Plan of care signed (Y/N):  Y  Visit# / total visits:    3/6-8  Pain level: 7/10     Time In:     1022  Time Out:  1125    Subjective:   Patient presents for first of two scheduled treatment sessions this week.    She reports pain  7/10  in LEs and knees this morning.      Exercises:  Exercise/Equipment Resistance/Repetitions Comments   StepOne   7 min L2           Sit < > stand x10  green chair + 2\" foam           Calf raises 2 x10   floor         Step ups x10  4\" ea L/R         Standing march x15             Seated hip add  2 x15    ball                       abd 2 x15    otb           Seated hip flex 2 x10 ea L/R    LAQ  2 x10 ea L/R                    Objective:   Ther. exercise/activity as listed per flow sheet above.    Treatment completed with MH/PreMod to B knee x 15 minutes.      Assessment:   Patient performs exercises/activities with good effort and pacing.        Evaluation Findings:   c/o weakness B LE with decreased balance for ~ 4 months of insidious onset   c/o pain across B knees with all prolonged activities, 8/10   PMH for L TIP and B RTC repairs   AROM WFL/strength grossly 3+, 4/5 for all ranges BL LE     Gait (w/ rolling walker): slow/guarded sera with short/equal strides and heel-toe progression noted B LE's   Static/dynamic balance is FAIR+   Endurance FAIR+ for all prolonged activities       Goals: (3-4 wks)  Increase strength across B LE's to grossly 4 to 4+/5 for all ranges improving static/dynamic balance to

## 2025-03-12 ENCOUNTER — HOSPITAL ENCOUNTER (OUTPATIENT)
Dept: PHYSICAL THERAPY | Age: 80
Setting detail: THERAPIES SERIES
Discharge: HOME | End: 2025-03-12
Payer: MEDICARE

## 2025-03-12 PROCEDURE — 97110 THERAPEUTIC EXERCISES: CPT

## 2025-03-12 PROCEDURE — G0283 ELEC STIM OTHER THAN WOUND: HCPCS

## 2025-03-12 NOTE — PROGRESS NOTES
[facility]                Phone: [phone]   Fax: [phone]    Physical Therapy Daily Treatment Note      Date:  3/12/2025    Patient Name:  Candace Garcia   :  1945 MRN: [id number]    Evaluating therapist:  CHEN Girard      25  Referring Practitioner:  BRAYDEN Almanza    Diagnosis:  Gait dysfunction  Restrictions/Precautions:    Insurance/Certification:  [facility] Medicare Dual Complete      Cert. dates:  25  to  25              Plan of care signed (Y/N):  Y  Visit# / total visits:    -8  Pain level: 5-8/10     Time In:     1030  Time Out:   1134    Subjective:   Patient presents for second of two scheduled treatment sessions this week.    She states her back was sore later in the day and yesterday after treatment session on Monday.   She states she is feeling tired in general this morning.    She reports pain  5/10  in LEs and knees this morning at rest prior to session.    Exercises:  Exercise/Equipment Resistance/Repetitions Comments   StepOne   7 min L2           Sit < > stand x10  green chair + 2\" foam           Calf raises 2 x10   floor         Step ups x10  4\" ea L/R         Standing march x15             Seated hip add  2 x15    ball                       abd 2 x15    otb           Seated hip flex 2 x10 ea L/R    LAQ  2 x10 ea L/R  2#                    Objective:   Ther. exercise/activity as listed per flow sheet above.    Treatment completed with MH/PreMod to B knee x 15 minutes.      Assessment:   Patient performs exercises/activities with good effort and pacing.    Pain noted, 8/10, in L thigh/knee with fwd step ups (1st exercises following Step one).        Evaluation Findings:   c/o weakness B LE with decreased balance for ~ 4 months of insidious onset   c/o pain across B knees with all prolonged activities, 8/10   PMH for L TIP and B RTC repairs   AROM WFL/strength grossly 3+, 4/5 for all ranges BL LE     Gait (w/ rolling walker): slow/guarded

## 2025-03-17 ENCOUNTER — HOSPITAL ENCOUNTER (OUTPATIENT)
Dept: PHYSICAL THERAPY | Age: 80
Setting detail: THERAPIES SERIES
Discharge: HOME | End: 2025-03-17
Payer: MEDICARE

## 2025-03-17 PROCEDURE — G0283 ELEC STIM OTHER THAN WOUND: HCPCS

## 2025-03-17 PROCEDURE — 97110 THERAPEUTIC EXERCISES: CPT

## 2025-03-17 NOTE — PROGRESS NOTES
for all prolonged activities       Goals: (3-4 wks)  Increase strength across B LE's to grossly 4 to 4+/5 for all ranges improving static/dynamic balance to GOOD   Improve endurance for all prolonged activities to GOOD  Assure I with HEP for home management of condition      Home Exercise Program:  provided 2/28/25    Treatment/Activity Tolerance:  [x] Patient tolerated treatment well [] Patient limited by fatigue  [] Patient limited by pain  [] Patient limited by other medical complications  [] Other:     Prognosis: [] Good [x] Fair  [] Poor    Patient Requires Follow-up: [x] Yes  [] No    Plan:   [x] Continue per plan of care [] Alter current plan (see comments)  [] Plan of care initiated [] Hold pending MD visit [] Discharge    Plan for Next Session:  Continue POC with progressions per patient tolerance.        See Progress Note: [x]  Yes  []  No          CPT codes 3/17/2025 Units      Low Complexity PT evaluation 06244 59304      Moderate Complexity PT evaluation  01208      High Complexity PT evaluation 70217      PT Re-evaluation  07069      Gait training 70007      Neuromuscular reeducation  93890      Electrical Stimulation 81897 1     Manual therapy  37191      Therapeutic activities  58349      Therapeutic exercises  15084 3            Electronically signed by:  CARRIE RANGEL, ATC, PTA [id number]

## 2025-03-19 ENCOUNTER — HOSPITAL ENCOUNTER (OUTPATIENT)
Dept: CARDIOLOGY | Age: 80
Discharge: HOME | End: 2025-03-21
Attending: INTERNAL MEDICINE
Payer: MEDICARE

## 2025-03-19 VITALS
BODY MASS INDEX: 30.36 KG/M2 | HEIGHT: 62 IN | SYSTOLIC BLOOD PRESSURE: 122 MMHG | WEIGHT: 165 LBS | DIASTOLIC BLOOD PRESSURE: 66 MMHG

## 2025-03-19 DIAGNOSIS — R06.02 SHORTNESS OF BREATH: ICD-10-CM

## 2025-03-19 PROCEDURE — 93306 TTE W/DOPPLER COMPLETE: CPT

## 2025-03-20 LAB
ECHO AR MAX VEL PISA: 4.6 M/S
ECHO AV AREA PEAK VELOCITY: 2.9 CM2
ECHO AV AREA VTI: 3.1 CM2
ECHO AV AREA/BSA PEAK VELOCITY: 1.6 CM2/M2
ECHO AV AREA/BSA VTI: 1.8 CM2/M2
ECHO AV CUSP MM: 1.7 CM
ECHO AV MEAN GRADIENT: 2 MMHG
ECHO AV MEAN VELOCITY: 0.7 M/S
ECHO AV PEAK GRADIENT: 7 MMHG
ECHO AV PEAK VELOCITY: 1.3 M/S
ECHO AV REGURGITANT PHT: 533.4 MS
ECHO AV VELOCITY RATIO: 0.77
ECHO AV VTI: 24 CM
ECHO BSA: 1.81 M2
ECHO EST RA PRESSURE: 3 MMHG
ECHO LA DIAMETER INDEX: 2.05 CM/M2
ECHO LA DIAMETER: 3.6 CM
ECHO LA VOL A-L A2C: 39 ML (ref 22–52)
ECHO LA VOL A-L A4C: 57 ML (ref 22–52)
ECHO LA VOL MOD A2C: 38 ML (ref 22–52)
ECHO LA VOL MOD A4C: 54 ML (ref 22–52)
ECHO LA VOLUME AREA LENGTH: 48 ML
ECHO LA VOLUME INDEX A-L A2C: 22 ML/M2 (ref 16–34)
ECHO LA VOLUME INDEX A-L A4C: 32 ML/M2 (ref 16–34)
ECHO LA VOLUME INDEX AREA LENGTH: 27 ML/M2 (ref 16–34)
ECHO LA VOLUME INDEX MOD A2C: 22 ML/M2 (ref 16–34)
ECHO LA VOLUME INDEX MOD A4C: 31 ML/M2 (ref 16–34)
ECHO LV EF PHYSICIAN: 55 %
ECHO LV FRACTIONAL SHORTENING: 34 % (ref 28–44)
ECHO LV INTERNAL DIMENSION DIASTOLE INDEX: 2.33 CM/M2
ECHO LV INTERNAL DIMENSION DIASTOLIC: 4.1 CM (ref 3.9–5.3)
ECHO LV INTERNAL DIMENSION SYSTOLIC INDEX: 1.53 CM/M2
ECHO LV INTERNAL DIMENSION SYSTOLIC: 2.7 CM
ECHO LV IVSD: 1.2 CM (ref 0.6–0.9)
ECHO LV MASS 2D: 151.3 G (ref 67–162)
ECHO LV MASS INDEX 2D: 86 G/M2 (ref 43–95)
ECHO LV POSTERIOR WALL DIASTOLIC: 1 CM (ref 0.6–0.9)
ECHO LV RELATIVE WALL THICKNESS RATIO: 0.49
ECHO LVOT AREA: 3.8 CM2
ECHO LVOT AV VTI INDEX: 0.84
ECHO LVOT DIAM: 2.2 CM
ECHO LVOT MEAN GRADIENT: 2 MMHG
ECHO LVOT PEAK GRADIENT: 4 MMHG
ECHO LVOT PEAK VELOCITY: 1 M/S
ECHO LVOT STROKE VOLUME INDEX: 43.4 ML/M2
ECHO LVOT SV: 76.4 ML
ECHO LVOT VTI: 20.1 CM
ECHO MV A VELOCITY: 0.78 M/S
ECHO MV AREA PHT: 3.2 CM2
ECHO MV AREA VTI: 2.9 CM2
ECHO MV E DECELERATION TIME (DT): 205.9 MS
ECHO MV E VELOCITY: 0.9 M/S
ECHO MV E/A RATIO: 1.15
ECHO MV LVOT VTI INDEX: 1.3
ECHO MV MAX VELOCITY: 0.9 M/S
ECHO MV MEAN GRADIENT: 1 MMHG
ECHO MV MEAN VELOCITY: 0.5 M/S
ECHO MV PEAK GRADIENT: 3 MMHG
ECHO MV PRESSURE HALF TIME (PHT): 67.7 MS
ECHO MV VTI: 26.1 CM
ECHO PULMONARY ARTERY SYSTOLIC PRESSURE (PASP): 34 MMHG
ECHO PV MAX VELOCITY: 0.9 M/S
ECHO PV MEAN GRADIENT: 1 MMHG
ECHO PV MEAN VELOCITY: 0.6 M/S
ECHO PV PEAK GRADIENT: 3 MMHG
ECHO PV VTI: 11 CM
ECHO RIGHT VENTRICULAR SYSTOLIC PRESSURE (RVSP): 34 MMHG
ECHO RV INTERNAL DIMENSION: 2.8 CM
ECHO TV REGURGITANT MAX VELOCITY: 2.8 M/S
ECHO TV REGURGITANT PEAK GRADIENT: 32 MMHG

## 2025-03-21 ENCOUNTER — HOSPITAL ENCOUNTER (OUTPATIENT)
Dept: PHYSICAL THERAPY | Age: 80
Setting detail: THERAPIES SERIES
Discharge: HOME | End: 2025-03-21
Payer: MEDICARE

## 2025-03-21 PROCEDURE — G0283 ELEC STIM OTHER THAN WOUND: HCPCS

## 2025-03-21 PROCEDURE — 97110 THERAPEUTIC EXERCISES: CPT

## 2025-03-21 NOTE — PROGRESS NOTES
[facility]                Phone: [phone]   Fax: [phone]    Physical Therapy Daily Treatment Note      Date:  3/21/2025    Patient Name:  Candace Garcia   :  1945 MRN: [id number]      Evaluating therapist:  CHEN Girard      25  Referring Practitioner:  BRAYDEN Almanza    Diagnosis:  Gait dysfunction  Restrictions/Precautions:    Insurance/Certification:  [facility] Medicare Dual Complete      Cert. dates:  25  to  25              Plan of care signed (Y/N):  Y  Visit# / total visits:      Pain level: 8/10     Time In:     1031  Time Out:   1140    Subjective:   Patient presents for second  of two scheduled treatment sessions this week.   She reports pain  8/10  in L LE with pain in LB and R LE.      Exercises:  Exercise/Equipment Resistance/Repetitions Comments   StepOne   7 min L2           Sit < > stand x10  green chair + 2\" foam           Calf raises 2 x10   floor         Step ups 2 x10  4\" ea L/R         Standing march 2 x10            Seated hip add  2 x15    ball                       abd 2 x15    gtb           Seated hip flex 2 x10 ea L/R    LAQ  2 x10 ea L/R  2#                    Objective:   Ther. exercise/activity as listed per flow sheet above.    Treatment completed with MH/PreMod to B knee x 15 minutes.    AROM WFL BL LE  Strength grossly 3+ to 4/5 for all ranges BL LE       Assessment:   Patient performs exercises/activities with good effort and pacing.    Static/dynamic balance is FAIR+   Endurance FAIR+/GOOD-    Evaluation Findings:   c/o weakness B LE with decreased balance for ~ 4 months of insidious onset   c/o pain across B knees with all prolonged activities, 8/10   PMH for L TIP and B RTC repairs   AROM WFL/strength grossly 3+, 4/5 for all ranges BL LE     Gait (w/ rolling walker): slow/guarded sera with short/equal strides and heel-toe progression noted B LE's   Static/dynamic balance is FAIR+   Endurance FAIR+ for all prolonged

## 2025-03-26 ENCOUNTER — HOSPITAL ENCOUNTER (OUTPATIENT)
Dept: PHYSICAL THERAPY | Age: 80
Setting detail: THERAPIES SERIES
Discharge: HOME | End: 2025-03-26
Payer: MEDICARE

## 2025-03-26 PROCEDURE — G0283 ELEC STIM OTHER THAN WOUND: HCPCS

## 2025-03-26 PROCEDURE — 97110 THERAPEUTIC EXERCISES: CPT

## 2025-03-26 NOTE — PROGRESS NOTES
[facility]                Phone: [phone]   Fax: [phone]    Physical Therapy Daily Treatment Note      Date:  3/26/2025    Patient Name:  Candace Garcia   :  1945 MRN: [id number]      Evaluating therapist:  CHEN Girard      25  Referring Practitioner:  BRAYDEN Almanza    Diagnosis:  Gait dysfunction  Restrictions/Precautions:    Insurance/Certification:  [facility] Medicare Dual Complete      Cert. dates:  25  to  25              Plan of care signed (Y/N):  Y  Visit# / total visits:      Pain level: 8/10     Time In:     1016  Time Out:    1115    Subjective:   Patient presents for second  of two scheduled treatment sessions this week.   She reports she fell in her kitchen yesterday.  She denies having any injuries from the fall.  She reports she has more pain in L thigh today, 8/10.      Exercises:  Exercise/Equipment Resistance/Repetitions Comments   StepOne   7 min L2           Sit < > stand x10  green chair + 2\" foam           Calf raises 2 x10   floor         Step ups         Standing march 2 x15            Seated hip add  2 x15    ball                       abd 2 x15    gtb           Seated hip flex 2 x10 ea L/R    LAQ  2 x10 ea L/R  2#                    Objective:   Ther. exercise/activity as listed per flow sheet above.    Treatment completed with MH/PreMod to B knee x 15 minutes.    AROM WFL BL LE  Strength grossly 3+ to 4/5 for all ranges BL LE     Gait w/ rollator:  mildly decreased stance time L LE resulting in shortened step length R LE.      Assessment:   Patient performs exercises/activities with good effort and pacing.    Static/dynamic balance is FAIR+   Endurance FAIR+/GOOD-    Evaluation Findings:   c/o weakness B LE with decreased balance for ~ 4 months of insidious onset   c/o pain across B knees with all prolonged activities, 8/10   PMH for L TIP and B RTC repairs   AROM WFL/strength grossly 3+, 4/5 for all ranges BL LE     Gait (w/

## 2025-03-28 ENCOUNTER — HOSPITAL ENCOUNTER (OUTPATIENT)
Dept: PHYSICAL THERAPY | Age: 80
Setting detail: THERAPIES SERIES
Discharge: HOME | End: 2025-03-28
Payer: MEDICARE

## 2025-03-28 PROCEDURE — 97110 THERAPEUTIC EXERCISES: CPT

## 2025-03-28 PROCEDURE — G0283 ELEC STIM OTHER THAN WOUND: HCPCS

## 2025-03-28 NOTE — PROGRESS NOTES
[facility]                Phone: [phone]   Fax: [phone]    Physical Therapy Daily Treatment Note      Date:  3/28/2025    Patient Name:  Candace Garcia   :  1945 MRN: [id number]    Evaluating therapist:  CHEN Girard      25  Referring Practitioner:  BRAYDEN Almanza    Diagnosis:  Gait dysfunction  Restrictions/Precautions:    Insurance/Certification:  [facility] Medicare Dual Complete      Cert. dates:  25  to  25              Plan of care signed (Y/N):  Y  Visit# / total visits:      Pain level:  7-8/10     Time In:    0959  Time Out:   1104    Subjective:   Patient presents for final scheduled treatment sessions.    She reports pain is about the same across LB and both knees,  7-8/10      Exercises:  Exercise/Equipment Resistance/Repetitions Comments   StepOne   7 min L2           Sit < > stand x10  green chair + 2\" foam           Calf raises 2 x10   floor         Step ups         Standing march 2 x15            Seated hip add  2 x15    ball                       abd 2 x15    gtb           Seated hip flex 2 x10 ea L/R    LAQ  2 x10 ea L/R  2#                    Objective:   Ther. exercise/activity as listed per flow sheet above.    Treatment completed with MH/PreMod to B knee x 15 minutes.      Assessment:   Patient performs exercises/activities with good effort and pacing.      Evaluation Findings:   c/o weakness B LE with decreased balance for ~ 4 months of insidious onset   c/o pain across B knees with all prolonged activities, 8/10   PMH for L TIP and B RTC repairs   AROM WFL/strength grossly 3+, 4/5 for all ranges BL LE     Gait (w/ rolling walker): slow/guarded sera with short/equal strides and heel-toe progression noted B LE's   Static/dynamic balance is FAIR+   Endurance FAIR+ for all prolonged activities       Goals: (3-4 wks)  Increase strength across B LE's to grossly 4 to 4+/5 for all ranges improving static/dynamic balance to GOOD   Improve

## 2025-03-28 NOTE — PROGRESS NOTES
[facility]                Phone: [phone]   Fax: [phone]      Physical Therapy  Treatment Summary       Date:  3/28/2025    Patient Name:  Candace Garcia    :  1945  MRN: [id number]      PHYSICAL THERAPIST:  CHEN Girard   REFERRING PHYSICIAN:  BRAYDEN Almanza    DIAGNOSIS:  Gait dysfunction      ATTENDANCE:  Patient has attended 8 of 8 scheduled treatments from 25  to 3/28/25.    TREATMENTS RECEIVED:  Therapeutic exercise, therapeutic activity, balance/proprioceptive activities, postural awareness/positioning training, HEP, modalities.      INITIAL STATUS:  c/o weakness B LE with decreased balance for ~ 4 months of insidious onset   c/o pain across B knees with all prolonged activities, 8/10   PMH for L TIP and B RTC repairs   AROM WFL/strength grossly 3+, 4/5 for all ranges BL LE     Gait (w/ rolling walker): slow/guarded sera with short/equal strides and heel-toe progression noted B LE's   Static/dynamic balance is FAIR+   Endurance FAIR+ for all prolonged activities       FINAL STATUS:  c/o weakness B LE with decreased balance for ~ 4 months of insidious onset   c/o pain across B knees with all prolonged activities, 8/10   AROM WFL BL LE  Strength grossly 3+ to 4/5 for all ranges BL LE     Gait (w/ rolling walker): slow/guarded sera with short/equal strides and heel-toe progression noted B LE's   Static/dynamic balance is FAIR+   Endurance FAIR+/GOOD-  Independent with HEP    GOALS:  1 out of 3 Long Term Goals were obtained.    LONG TERM GOALS OBTAINED/NOT OBTAINED:   Increase strength across B LE's to grossly 4 to 4+/5 for all ranges improving static/dynamic balance to GOOD    NOT ACHIEVED  Improve endurance for all prolonged activities to GOOD   NOT ACHIEVED  Assure I with HEP for home management of condition     ACHIEVED      PATIENT EDUCATION/INSTRUCTIONS:  Patient instructed on and provided copy of HEP.             Electronically Signed by: CARRIE

## 2025-04-01 ENCOUNTER — RESULTS FOLLOW-UP (OUTPATIENT)
Dept: CARDIOLOGY CLINIC | Age: 80
End: 2025-04-01

## 2025-04-01 NOTE — TELEPHONE ENCOUNTER
----- Message from Dr. Frank Dozier MD sent at 3/23/2025  6:38 PM EDT -----  Unremarkable echo. Did she get labs? Did the lasix help?

## 2025-04-17 ENCOUNTER — OFFICE VISIT (OUTPATIENT)
Dept: ORTHOPEDIC SURGERY | Age: 80
End: 2025-04-17

## 2025-04-17 VITALS — DIASTOLIC BLOOD PRESSURE: 80 MMHG | HEART RATE: 87 BPM | SYSTOLIC BLOOD PRESSURE: 143 MMHG | TEMPERATURE: 97.7 F

## 2025-04-17 DIAGNOSIS — M17.12 PRIMARY OSTEOARTHRITIS OF LEFT KNEE: Primary | ICD-10-CM

## 2025-04-17 DIAGNOSIS — M17.11 PRIMARY OSTEOARTHRITIS OF RIGHT KNEE: ICD-10-CM

## 2025-04-17 RX ORDER — BUPIVACAINE HYDROCHLORIDE 2.5 MG/ML
3 INJECTION, SOLUTION INFILTRATION; PERINEURAL ONCE
Status: COMPLETED | OUTPATIENT
Start: 2025-04-17 | End: 2025-04-17

## 2025-04-17 RX ORDER — TRIAMCINOLONE ACETONIDE 40 MG/ML
80 INJECTION, SUSPENSION INTRA-ARTICULAR; INTRAMUSCULAR ONCE
Status: COMPLETED | OUTPATIENT
Start: 2025-04-17 | End: 2025-04-17

## 2025-04-17 RX ADMIN — TRIAMCINOLONE ACETONIDE 80 MG: 40 INJECTION, SUSPENSION INTRA-ARTICULAR; INTRAMUSCULAR at 13:27

## 2025-04-17 RX ADMIN — BUPIVACAINE HYDROCHLORIDE 7.5 MG: 2.5 INJECTION, SOLUTION INFILTRATION; PERINEURAL at 13:26

## 2025-04-17 RX ADMIN — BUPIVACAINE HYDROCHLORIDE 7.5 MG: 2.5 INJECTION, SOLUTION INFILTRATION; PERINEURAL at 13:27

## 2025-04-17 NOTE — PROGRESS NOTES
back pain.  She ambulates with a walker.  The pain is constant and she has been diagnosed with arthritis in the past.  She is interested in starting therapy for her back.  PAST MEDICAL HISTORY  Past Medical History:   Diagnosis Date    Arthritis     Hypertension        PAST SURGICAL HISTORY  Past Surgical History:   Procedure Laterality Date    ROTATOR CUFF REPAIR Bilateral     TOTAL HIP ARTHROPLASTY Right 8/17/2021    HIP TOTAL ARTHROPLASTY----KANCHAN performed by Darshan Duron MD at [facility] OR         FAMILY HISTORY   No family history on file.    SOCIAL HISTORY  Social History     Socioeconomic History    Marital status:      Spouse name: Not on file    Number of children: Not on file    Years of education: Not on file    Highest education level: Not on file   Occupational History    Not on file   Tobacco Use    Smoking status: Never    Smokeless tobacco: Never   Vaping Use    Vaping status: Never Used   Substance and Sexual Activity    Alcohol use: Not Currently    Drug use: Never    Sexual activity: Not on file   Other Topics Concern    Not on file   Social History Narrative    Not on file     Social Drivers of Health     Financial Resource Strain: Not on file   Food Insecurity: No Food Insecurity (12/22/2023)    Hunger Vital Sign     Worried About Running Out of Food in the Last Year: Never true     Ran Out of Food in the Last Year: Never true   Transportation Needs: No Transportation Needs (12/22/2023)    PRAPARE - Transportation     Lack of Transportation (Medical): No     Lack of Transportation (Non-Medical): No   Physical Activity: Not on file   Stress: Not on file   Social Connections: Not on file   Intimate Partner Violence: Not on file   Housing Stability: Low Risk  (12/22/2023)    Housing Stability Vital Sign     Unable to Pay for Housing in the Last Year: No     Number of Places Lived in the Last Year: 1     Unstable Housing in the Last Year: No     Social History     Occupational History

## 2025-06-04 ENCOUNTER — HOSPITAL ENCOUNTER (OUTPATIENT)
Dept: PHYSICAL THERAPY | Age: 80
Setting detail: THERAPIES SERIES
Discharge: HOME | End: 2025-06-04

## 2025-06-04 ASSESSMENT — PAIN DESCRIPTION - DESCRIPTORS: DESCRIPTORS: ACHING;BURNING

## 2025-06-04 ASSESSMENT — PAIN DESCRIPTION - LOCATION: LOCATION: KNEE

## 2025-06-04 ASSESSMENT — PAIN SCALES - GENERAL: PAINLEVEL_OUTOF10: 8

## 2025-06-04 ASSESSMENT — PAIN DESCRIPTION - PAIN TYPE: TYPE: CHRONIC PAIN

## 2025-06-04 ASSESSMENT — PAIN DESCRIPTION - ORIENTATION: ORIENTATION: RIGHT;LEFT

## 2025-06-04 NOTE — PROGRESS NOTES
Physical Therapy: Initial Evaluation    Patient: Candace Garcia (79 y.o. female)   Examination Date: 2025  Plan of Care Certification Period: 2025 to        :  1945 ;    Confirmed: Yes MRN: [id number]  CSN: [id number]   Insurance: Payor: [facility] MEDICARE / Plan: [employer] DUAL COMPLETE / Product Type: *No Product type* /   Insurance ID: [id number] - (Medicare Managed) Secondary Insurance (if applicable): MEDICAID [place]   Referring Physician: Magnus Almanza APRN - *     PCP: Magnus Almanza APRN - CNP Visits to Date/Visits Approved:     No Show/Cancelled Appts:   /       Medical Diagnosis: Difficulty in walking, not elsewhere classified [R26.2]    Treatment Diagnosis:       PERTINENT MEDICAL HISTORY           Medical History: Chart Reviewed: Yes   Past Medical History:   Diagnosis Date    Arthritis     Hypertension      Surgical History:   Past Surgical History:   Procedure Laterality Date    ROTATOR CUFF REPAIR Bilateral     TOTAL HIP ARTHROPLASTY Right 2021    HIP TOTAL ARTHROPLASTY----KANCHAN performed by Darshan Duron MD at [facility] OR       Medications:   Current Outpatient Medications:     potassium chloride (KLOR-CON) 10 MEQ extended release tablet, Take 1 tablet by mouth daily, Disp: , Rfl:     rosuvastatin (CRESTOR) 40 MG tablet, Take 1 tablet by mouth every evening, Disp: , Rfl:     montelukast (SINGULAIR) 10 MG tablet, Take 1 tablet by mouth Every Day, Disp: , Rfl:     furosemide (LASIX) 40 MG tablet, Take 1 tablet by mouth daily, Disp: 60 tablet, Rfl: 1    amLODIPine-benazepril (LOTREL) 5-20 MG per capsule, , Disp: , Rfl:     sertraline (ZOLOFT) 100 MG tablet, Take 1.5 mg by mouth daily, Disp: , Rfl:     mirabegron (MYRBETRIQ) 25 MG TB24, Take 1 tablet by mouth daily, Disp: , Rfl:     diclofenac sodium (VOLTAREN) 1 % GEL, Apply 4 g topically 4 times daily as needed for Pain, Disp: , Rfl:     fluticasone (FLONASE) 50 MCG/ACT nasal spray, 1 spray by Each Nostril route daily,

## 2025-06-04 NOTE — PROGRESS NOTES
[facility]                Phone: [phone]   Fax: [phone]    Physical Therapy Daily Treatment Note      Date:  2025    Patient Name:  Candace Garcia   :  1945 MRN: [id number]    Evaluating therapist:  CHEN Girard      (25)  Referring Practitioner:  BRAYDEN Almanza    Diagnosis:  Gait dysfunction  Restrictions/Precautions:    Insurance/Certification:  [employer] Medicare Dual Complete      Cert. dates:  25 to 25          ICD-10:  R26.2           Plan of care signed (Y/N):  Y  Visit# / total visits:    -  Pain level:  /10     Time In:      Time Out:       Subjective:      Exercises:  Exercise/Equipment Resistance/Repetitions Comments   StepOne              Sit < > stand            Calf raises          Step ups         Standing march            Seated hip add                         abd                        flex                   knee ext                          Objective:        Assessment:       Home Exercise Program:  provided     Treatment/Activity Tolerance:  [] Patient tolerated treatment well [] Patient limited by fatigue  [] Patient limited by pain  [] Patient limited by other medical complications  [] Other:     Prognosis: [] Good [] Fair  [] Poor    Patient Requires Follow-up: [] Yes  [] No    Plan:   [] Continue per plan of care [] Alter current plan (see comments)  [] Plan of care initiated [] Hold pending MD visit [] Discharge      See Progress Note: []  Yes  []  No      Electronically signed by:  Wilver Tomlin PT

## 2025-06-06 ENCOUNTER — HOSPITAL ENCOUNTER (OUTPATIENT)
Dept: PHYSICAL THERAPY | Age: 80
Setting detail: THERAPIES SERIES
Discharge: HOME | End: 2025-06-06
Payer: MEDICARE

## 2025-06-06 NOTE — PROGRESS NOTES
[facility]                Phone: [phone]  Fax: [phone]    Physical Therapy  Cancellation/No-show Note      Patient Name:  Candace Garcia  :  1945   Date:  2025      For today's appointment patient:  [x]  Cancelled  []  Rescheduled appointment  []  No-show     Reason given by patient:  []  Patient ill  []  Conflicting appointment  [x]  No transportation    []  Conflict with work  []  No reason given  []  Other:           Electronically signed by:  CARRIE RANGEL ATC, PTA [id number]                                                              Evaluation Findings:   c/o decreased LE strength and difficulty walking   c/o pain across B knees with all prolonged activities, 8/10   PMH for L TIP and B RTC repairs   AROM WFL/strength grossly 3+, 4/5 for all ranges BL LE     Gait (w/ rolling walker): slow/guarded sera with short/equal strides   Static/dynamic balance is FAIR+   Endurance FAIR+ for all prolonged activities         Goals: (3-4 wks)  Increase strength across B LE's to grossly 4+ to 5/5 for all ranges improving static/dynamic balance to GOOD/GOOD+  Improve endurance for all prolonged activities to GOOD/GOOD+  Assure I with HEP for home management of condition

## 2025-06-10 ENCOUNTER — HOSPITAL ENCOUNTER (OUTPATIENT)
Dept: PHYSICAL THERAPY | Age: 80
Setting detail: THERAPIES SERIES
Discharge: HOME | End: 2025-06-10
Payer: MEDICARE

## 2025-06-10 NOTE — PROGRESS NOTES
[facility]                Phone: [phone]  Fax: [phone]    Physical Therapy  Cancellation/No-show Note      Patient Name:  Candace Garcia  :  1945   Date:  6/10/2025      For today's appointment patient:  [x]  Cancelled  []  Rescheduled appointment  []  No-show     Reason given by patient:  []  Patient ill  []  Conflicting appointment  [x]  No transportation    []  Conflict with work  []  No reason given  []  Other:           Electronically signed by:  CARRIE RANGEL ATC, PTA [id number]

## 2025-06-13 ENCOUNTER — HOSPITAL ENCOUNTER (OUTPATIENT)
Dept: PHYSICAL THERAPY | Age: 80
Setting detail: THERAPIES SERIES
Discharge: HOME | End: 2025-06-13
Payer: MEDICARE

## 2025-06-13 PROCEDURE — 97110 THERAPEUTIC EXERCISES: CPT

## 2025-06-13 NOTE — PROGRESS NOTES
strength across B LE's to grossly 4+ to 5/5 for all ranges improving static/dynamic balance to GOOD/GOOD+  Improve endurance for all prolonged activities to GOOD/GOOD+  Assure I with HEP for home management of condition      Home Exercise Program:  provided     Treatment/Activity Tolerance:  [x] Patient tolerated treatment well   [] Patient limited by fatigue  [] Patient limited by pain    [] Patient limited by other medical complications  [] Other:     Prognosis: [] Good [] Fair  [] Poor    Patient Requires Follow-up: [x] Yes  [] No    Plan:   [x] Continue per plan of care   [] Alter current plan (see comments)  [] Plan of care initiated   [] Hold pending MD visit   [] Discharge    See Progress Note:       []  Yes [x]  No         CPT codes 6/13/2025 Units      Low Complexity PT evaluation 05054 [id number]      Moderate Complexity PT evaluation  55030      High Complexity PT evaluation 93916      PT Re-evaluation  37382      Gait training 23263      Neuromuscular reeducation  36432      Electrical Stimulation 54587      Manual therapy  95614      Therapeutic activities  80966      Therapeutic exercises  34982 3               Electronically signed by:  CARRIE RANGEL

## 2025-06-18 ENCOUNTER — HOSPITAL ENCOUNTER (OUTPATIENT)
Dept: PHYSICAL THERAPY | Age: 80
Setting detail: THERAPIES SERIES
Discharge: HOME | End: 2025-06-18
Payer: MEDICARE

## 2025-06-18 PROCEDURE — 97530 THERAPEUTIC ACTIVITIES: CPT

## 2025-06-18 PROCEDURE — 97110 THERAPEUTIC EXERCISES: CPT

## 2025-06-18 NOTE — PROGRESS NOTES
[facility]                Phone: [phone]   Fax: [phone]    Physical Therapy Daily Treatment Note      Date:  2025    Patient Name:  Candace Garcia   :  1945 MRN: [id number]    Evaluating therapist:  CHEN Girard     25  Referring Practitioner:  BRAYDEN Almanza    Diagnosis:  Gait dysfunction  Restrictions/Precautions:    Insurance/Certification:  [facility] Medicare Dual Complete      Cert. dates:  25 to 25          ICD-10:  R26.2           Plan of care signed (Y/N):  Y  Visit# / total visits:    3/6-  Pain level:      5/10    B knees    Time In:     1056  Time Out:  1200    Subjective:  Patient presents for second of two scheduled treatment sessions this week.  She states she is feeling tired this morning, but she states the pain in her knees it better this morning, 5/10.      Exercises:  Exercise/Equipment Resistance/Repetitions Comments   StepOne    7 min L2         Amb fwd 5 laps  @ // bars            side 5 laps  @ // bars         Sit < > stand 2 x10    chair +2\" foam         Calf raises 2 x10  floor         Step ups 2 x10 ea L/R  4\"         Standing march 2 x15           Seated hip add  2 x15                       abd 2 x15  gtb                       flex  nt         LAQ 2 x10 ea L/R  2#                    Objective:   Ther. exercise/activity as listed per flow sheet above.    Treatment completed with MH to B knee x 15 minutes.    BL LE strength grossly 3+ to 4/5    Gait w/ rolling walker: slow guarded sera, shortended but equal steps, reduced foot clearance BL    Assessment:   Patient performs exercises with good effort and pacing.   Static/dynamic balance is FAIR+  Endurance FAIR+    Evaluation Findings:   c/o decreased LE strength and difficulty walking   c/o pain across B knees with all prolonged activities, 8/10   PMH for L TIP and B RTC repairs   AROM WFL/strength grossly 3+, 4/5 for all ranges BL LE     Gait (w/ rolling walker): slow/guarded

## 2025-06-20 ENCOUNTER — HOSPITAL ENCOUNTER (OUTPATIENT)
Dept: PHYSICAL THERAPY | Age: 80
Setting detail: THERAPIES SERIES
Discharge: HOME | End: 2025-06-20
Payer: MEDICARE

## 2025-06-20 ENCOUNTER — APPOINTMENT (OUTPATIENT)
Dept: PHYSICAL THERAPY | Age: 80
End: 2025-06-20
Payer: MEDICARE

## 2025-06-20 PROCEDURE — 97530 THERAPEUTIC ACTIVITIES: CPT

## 2025-06-20 PROCEDURE — 97110 THERAPEUTIC EXERCISES: CPT

## 2025-06-20 NOTE — PROGRESS NOTES
[facility]                Phone: [phone]   Fax: [phone]    Physical Therapy Daily Treatment Note      Date:  2025    Patient Name:  Candace Garcia   :  1945 MRN: [id number]    Evaluating therapist:  CHEN Girard     25  Referring Practitioner:  BRAYDEN Almanza    Diagnosis:  Gait dysfunction  Restrictions/Precautions:    Insurance/Certification:  [facility] Medicare Dual Complete      Cert. dates:  25 to 25          ICD-10:  R26.2           Plan of care signed (Y/N):  Y  Visit# / total visits:    -  Pain level:           Time In:     1047  Time Out:   1148    Subjective:  Patient presents for second of two scheduled treatment sessions this week.  She states she is feeling a little tired again today.    Exercises:  Exercise/Equipment Resistance/Repetitions Comments   StepOne    7 min L2         Amb fwd 5 laps  @ // bars            side 5 laps  @ // bars         Sit < > stand x10    chair +2\" foam         Calf raises 2 x10  floor         Step ups 2 x10 ea L/R  4\"         Standing march 2 x15           Seated hip add  2 x15                       abd 2 x15  gtb         LAQ 2 x10 ea L/R  2#                    Objective:   Ther. exercise/activity as listed per flow sheet above.    Treatment completed with MH to B knee x 15 minutes.      Assessment:   Patient performs exercises with good effort and pacing.   Increased general fatigue noted during session today.    She states her \"whole left leg is hurting today.\"    Reduced knee flexion and increased levering over L hip with fwd steps ups today.      Evaluation Findings:   c/o decreased LE strength and difficulty walking   c/o pain across B knees with all prolonged activities, 8/10   PMH for L TIP and B RTC repairs   AROM WFL/strength grossly 3+, 4/5 for all ranges BL LE     Gait (w/ rolling walker): slow/guarded sera with short/equal strides   Static/dynamic balance is FAIR+   Endurance FAIR+ for all

## 2025-06-30 ENCOUNTER — HOSPITAL ENCOUNTER (OUTPATIENT)
Dept: PHYSICAL THERAPY | Age: 80
Setting detail: THERAPIES SERIES
Discharge: HOME | End: 2025-06-30
Payer: MEDICARE

## 2025-06-30 NOTE — PROGRESS NOTES
[facility]                Phone: [phone]  Fax: [phone]    Physical Therapy  Cancellation/No-show Note      Patient Name:  Candace Garcia  :  1945   Date:  2025      For today's appointment patient:  [x]  Cancelled  []  Rescheduled appointment  []  No-show     Reason given by patient:  []  Patient ill  []  Conflicting appointment  []  No transportation    []  Conflict with work  []  No reason given  []  Other:       Comments:  She states she fell yesterday and is still too sore today to come to PT.   She is scheduled WED @ 11:00        Electronically signed by:  CARRIE RANGEL ATC, PTA [id number]

## 2025-07-02 ENCOUNTER — HOSPITAL ENCOUNTER (OUTPATIENT)
Dept: PHYSICAL THERAPY | Age: 80
Setting detail: THERAPIES SERIES
Discharge: HOME | End: 2025-07-02

## 2025-07-02 NOTE — PROGRESS NOTES
[facility]                Phone: [phone]  Fax: [phone]    Physical Therapy  Cancellation/No-show Note      Patient Name:  Candace Garcia  :  1945   Date:  2025    For today's appointment patient:  [x]  Cancelled  []  Rescheduled appointment  []  No-show     Reason given by patient:  []  Patient ill  []  Conflicting appointment  [x]  No transportation    []  Conflict with work  []  No reason given  []  Other:           Electronically signed by:  CARRIE RANGEL ATC, PTA [id number]

## 2025-07-07 ENCOUNTER — HOSPITAL ENCOUNTER (OUTPATIENT)
Dept: PHYSICAL THERAPY | Age: 80
Setting detail: THERAPIES SERIES
Discharge: HOME | End: 2025-07-07
Payer: MEDICARE

## 2025-07-07 PROCEDURE — 97110 THERAPEUTIC EXERCISES: CPT

## 2025-07-07 PROCEDURE — 97530 THERAPEUTIC ACTIVITIES: CPT

## 2025-07-07 NOTE — PROGRESS NOTES
[facility]                Phone: [phone]   Fax: [phone]    Physical Therapy Daily Treatment Note      Date:  2025    Patient Name:  Candace Garcia   :  1945 MRN: [id number]    Evaluating therapist:  CHEN Girard     25  Referring Practitioner:  BRAYDEN Almanza    Diagnosis:  Gait dysfunction  Restrictions/Precautions:    Insurance/Certification:  [facility] Medicare Dual Complete      Cert. dates:  25 to 25          ICD-10:  R26.2           Plan of care signed (Y/N):  Y  Visit# / total visits:    -  Pain level:       7/10 L knee    Time In:     1052  Time Out:   1157     Subjective:  Patient presents for first of two scheduled treatment sessions this week.  She reports pain in L knee has bee a little worse, 7/10.  She states her R knee has not been very bad over the past week.      Exercises:  Exercise/Equipment Resistance/Repetitions Comments   StepOne    7 min L2         Amb fwd 5 laps  @ // bars            side 5 laps  @ // bars         Sit < > stand x10    chair +2\" foam         Calf raises 2 x10  floor         Step ups 2 x10 ea L/R  4\"         Standing march 2 x15           Seated hip add  2 x15                       abd 2 x15  gtb         LAQ 2 x10 ea L/R  2#                    Objective:   Ther. exercise/activity as listed per flow sheet above.    Treatment completed with MH to B knee x 15 minutes.    BL LE strength grossly 3+ to 4/5    Gait w/ rolling walker: slow guarded sera, shortended but equal steps, reduced foot clearance BL    Assessment:   Patient performs exercises with good effort and pacing.   No significant improvements noted with gait, functional balance, or LE strength from previous week.      Evaluation Findings:   c/o decreased LE strength and difficulty walking   c/o pain across B knees with all prolonged activities, 8/10   PMH for L TIP and B RTC repairs   AROM WFL/strength grossly 3+, 4/5 for all ranges BL LE     Gait (w/

## 2025-07-09 ENCOUNTER — HOSPITAL ENCOUNTER (OUTPATIENT)
Dept: PHYSICAL THERAPY | Age: 80
Setting detail: THERAPIES SERIES
Discharge: HOME | End: 2025-07-09
Payer: MEDICARE

## 2025-07-09 PROCEDURE — 97110 THERAPEUTIC EXERCISES: CPT

## 2025-07-09 PROCEDURE — 97530 THERAPEUTIC ACTIVITIES: CPT

## 2025-07-09 NOTE — PROGRESS NOTES
[facility]                Phone: [phone]   Fax: [phone]    Physical Therapy Daily Treatment Note      Date:  2025    Patient Name:  Candace Garcia   :  1945 MRN: [id number]    Evaluating therapist:  CHEN Girard     25  Referring Practitioner:  BRAYDEN Almanza    Diagnosis:  Gait dysfunction  Restrictions/Precautions:    Insurance/Certification:  [facility] Medicare Dual Complete      Cert. dates:  25 to 25          ICD-10:  R26.2           Plan of care signed (Y/N):  Y  Visit# / total visits:      Pain level:      8/10  LBP    Time In:    1049  Time Out:  1144    Subjective:  Patient presents forsecond of two scheduled treatment sessions this week.  She reports pain in LB this morning prior to session, 8/10, stating she bent forward this morning causing pain in her LB.   She states she hasn't noticed her knee pain this morning due to high level of LBP.      Exercises:  Exercise/Equipment Resistance/Repetitions Comments   StepOne    7 min L2         Amb fwd 5 laps  @ // bars            side         Sit < > stand x10    chair +2\" foam         Calf raises 2 x10  floor         Step ups 2 x10 ea L/R  4\"         Standing march 2 x15           Seated hip add  2 x15                       abd 2 x15  gtb         LAQ 2 x10 ea L/R  2#                    Objective:   Ther. exercise/activity as listed per flow sheet above.    MH to LB x 10 minutes to begin treatment.  BL LE strength grossly 3+ to 4/5   Gait w/ rolling walker: slow guarded sera, shortended but equal steps, reduced foot clearance BL    Assessment:   Patient performs exercises with good effort and pacing.   No significant improvements noted with gait, functional balance, endurance or LE strength from previous week.    Functional balance FAIR+  Endurance FAIR+    Evaluation Findings:   c/o decreased LE strength and difficulty walking   c/o pain across B knees with all prolonged activities, 8/10   PMH for

## 2025-07-14 ENCOUNTER — HOSPITAL ENCOUNTER (OUTPATIENT)
Dept: PHYSICAL THERAPY | Age: 80
Setting detail: THERAPIES SERIES
Discharge: HOME | End: 2025-07-14
Payer: MEDICARE

## 2025-07-14 NOTE — PROGRESS NOTES
[facility]                Phone: [phone]  Fax: [phone]    Physical Therapy  Cancellation/No-show Note      Patient Name:  Candace Garcia  :  1945   Date:  2025      For today's appointment patient:  []  Cancelled  []  Rescheduled appointment  []  No-show     Reason given by patient:  [x]  Patient ill  []  Conflicting appointment  []  No transportation    []  Conflict with work  []  No reason given  []  Other:           Electronically signed by:  CARRIE RANGEL ATC, PTA [id number]

## 2025-07-16 ENCOUNTER — HOSPITAL ENCOUNTER (OUTPATIENT)
Dept: PHYSICAL THERAPY | Age: 80
Setting detail: THERAPIES SERIES
Discharge: HOME | End: 2025-07-16
Payer: MEDICARE

## 2025-07-16 NOTE — PROGRESS NOTES
[facility]                Phone: [phone]  Fax: [phone]    Physical Therapy  Cancellation/No-show Note      Patient Name:  Candace Garcia  :  1945   Date:  2025      For today's appointment patient:  [x]  Cancelled  []  Rescheduled appointment  []  No-show     Reason given by patient:  []  Patient ill  []  Conflicting appointment  [x]  No transportation    []  Conflict with work  []  No reason given  []  Other:           Electronically signed by:  CARRIE RANGEL ATC, PTA [id number]

## 2025-07-18 ENCOUNTER — APPOINTMENT (OUTPATIENT)
Dept: PHYSICAL THERAPY | Age: 80
End: 2025-07-18
Payer: MEDICARE

## 2025-07-21 ENCOUNTER — HOSPITAL ENCOUNTER (OUTPATIENT)
Dept: PHYSICAL THERAPY | Age: 80
Setting detail: THERAPIES SERIES
Discharge: HOME | End: 2025-07-21
Payer: MEDICARE

## 2025-07-21 PROCEDURE — 97110 THERAPEUTIC EXERCISES: CPT

## 2025-07-21 PROCEDURE — 97530 THERAPEUTIC ACTIVITIES: CPT

## 2025-07-21 NOTE — PROGRESS NOTES
[facility]                Phone: [phone]   Fax: [phone]    Physical Therapy Daily Treatment Note      Date:  2025    Patient Name:  Candace Garcia   :  1945 MRN: [id number]    Evaluating therapist:  CHEN Girard     25  Referring Practitioner:  BRAYDEN Almanza    Diagnosis:  Gait dysfunction  Restrictions/Precautions:    Insurance/Certification:  [facility] Medicare Dual Complete      Cert. dates:  25 to 25          ICD-10:  R26.2           Plan of care signed (Y/N):  Y  Visit# / total visits:      Pain level:      8/10  LBP    Time In:    1059  Time Out:   1157    Subjective:  Patient presents for first of two scheduled treatment sessions this week.  She reports pain in L LE from knee into hip, 8/10 prior to session this morning.      Exercises:  Exercise/Equipment Resistance/Repetitions Comments   StepOne    7 min L2         Amb fwd 5 laps  @ // bars            side         Sit < > stand x10    chair +2\" foam         Calf raises 2 x10  floor         Step ups 2 x10 ea L/R  4\"         Standing march 2 x15           Seated hip add  2 x15                       abd 2 x15  gtb         LAQ 2 x10 ea L/R  2#                    Objective:   Ther. exercise/activity as listed per flow sheet above.      Assessment:   Patient performs exercises with good effort and pacing.     Evaluation Findings:   c/o decreased LE strength and difficulty walking   c/o pain across B knees with all prolonged activities, 8/10   PMH for L TIP and B RTC repairs   AROM WFL/strength grossly 3+, 4/5 for all ranges BL LE     Gait (w/ rolling walker): slow/guarded sera with short/equal strides   Static/dynamic balance is FAIR+   Endurance FAIR+ for all prolonged activities       Goals: (3-4 wks)  Increase strength across B LE's to grossly 4+ to 5/5 for all ranges improving static/dynamic balance to GOOD/GOOD+  Improve endurance for all prolonged activities to GOOD/GOOD+  Assure I with HEP

## 2025-07-23 ENCOUNTER — HOSPITAL ENCOUNTER (OUTPATIENT)
Dept: PHYSICAL THERAPY | Age: 80
Setting detail: THERAPIES SERIES
Discharge: HOME | End: 2025-07-23
Payer: MEDICARE

## 2025-07-23 PROCEDURE — 97530 THERAPEUTIC ACTIVITIES: CPT

## 2025-07-23 PROCEDURE — 97110 THERAPEUTIC EXERCISES: CPT

## 2025-07-23 NOTE — PROGRESS NOTES
[facility]                Phone: [phone]   Fax: [phone]    Physical Therapy Daily Treatment Note      Date:  2025    Patient Name:  Candace Garcia   :  1945 MRN: [id number]    Evaluating therapist:  CHEN Girard     24  Referring Practitioner:  BRAYDEN Almanza    Diagnosis:  Gait dysfunction  Restrictions/Precautions:    Insurance/Certification:  [facility] Medicare Dual Complete      Cert. dates:  25 to 25          ICD-10:  R26.2           Plan of care signed (Y/N):  Y  Visit# / total visits:      Pain level:      7/10  knees/LB    Time In:      1055  Time Out:    1156    Subjective:  Patient presents for final scheduled treatment session.    She reports pain in both knees and LB today 7/10.      Exercises:  Exercise/Equipment Resistance/Repetitions Comments   StepOne    7 min L2         Amb fwd 5 laps  @ // bars            side         Sit < > stand x10    chair +2\" foam         Calf raises 2 x10  floor         Step ups 2 x10 ea L/R  4\"         Standing march 2 x15           Seated hip add  2 x15                       abd 2 x15  gtb         LAQ 2 x15 ea L/R  2#                    Objective:   Ther. exercise/activity as listed per flow sheet above.      Assessment:   Patient performs exercises with good effort and pacing.     Evaluation Findings:   c/o decreased LE strength and difficulty walking   c/o pain across B knees with all prolonged activities, 8/10   PMH for L TIP and B RTC repairs   AROM WFL/strength grossly 3+, 4/5 for all ranges BL LE     Gait (w/ rolling walker): slow/guarded sera with short/equal strides   Static/dynamic balance is FAIR+   Endurance FAIR+ for all prolonged activities       Goals: (3-4 wks)  Increase strength across B LE's to grossly 4+ to 5/5 for all ranges improving static/dynamic balance to GOOD/GOOD+  Improve endurance for all prolonged activities to GOOD/GOOD+  Assure I with [facility] for home management of condition

## 2025-07-23 NOTE — PROGRESS NOTES
[facility]                Phone: [phone]   Fax: [phone]      Physical Therapy  Treatment Summary       Date:  2025    Patient Name:  Candace Garcia   :  1945 MRN: [id number]      PHYSICAL THERAPIST:  CHEN Girard  REFERRING PHYSICIAN:  BRAYDEN Almanza    DIAGNOSIS:  Gait dysfunction      ATTENDANCE:  Patient has attended 8 of 8 scheduled treatments from 25  to 25.    TREATMENTS RECEIVED:  Therapeutic exercise, therapeutic activity, balance/proprioceptive activities, postural awareness/positioning training, HEP, modalities.      INITIAL STATUS:  c/o decreased LE strength and difficulty walking   c/o pain across B knees with all prolonged activities, 8/10   PMH for L TIP and B RTC repairs   AROM WFL/strength grossly 3+, 4/5 for all ranges BL LE     Gait (w/ rolling walker): slow/guarded sera with short/equal strides   Static/dynamic balance is FAIR+   Endurance FAIR+ for all prolonged activities       FINAL STATUS:  c/o decreased LE strength and difficulty walking   c/o pain across B knees with all prolonged activities, 8/10   BL LE strength grossly 3+ to 4/5   Gait w/ rolling walker: slow guarded sera, shortended but equal steps, reduced foot clearance BL  Functional balance FAIR+  Endurance FAIR+  Independent with HEP      GOALS:  1 out of 3 Long Term Goals were obtained.    LONG TERM GOALS OBTAINED/NOT OBTAINED:   Increase strength across B LE's to grossly 4+ to 5/5 for all ranges improving static/dynamic balance to GOOD/GOOD+   NOT ACHIEVED  Improve endurance for all prolonged activities to GOOD/GOOD+   NOT ACHIEVED  Assure I with HEP for home management of condition     ACHIEVED      PATIENT EDUCATION/INSTRUCTIONS:  Patient instructed on and provided copy of HEP.       COMMENTS:  Minimal progress noted with improving strength, balance, and endurance while in PT.  Patient is independent with HEP.        Electronically Signed by: CARRIE RANGEL

## (undated) DEVICE — NEEDLE HYPO 18GA L1.5IN PNK POLYPR HUB S STL REG BVL STR

## (undated) DEVICE — SOLUTION IRRIG 3000ML 0.9% SOD CHL USP UROMATIC PLAS CONT

## (undated) DEVICE — TOTAL HIP PK

## (undated) DEVICE — Device

## (undated) DEVICE — RETRACTOR INIT

## (undated) DEVICE — ELECTRODE PT RET AD L9FT HI MOIST COND ADH HYDRGEL CORDED

## (undated) DEVICE — SET ORTHO STD STORTSTD2

## (undated) DEVICE — RETRACTOR KNE PCA

## (undated) DEVICE — SET ORTHO TRI CERAMIC ACET INST

## (undated) DEVICE — REAMER ACET

## (undated) DEVICE — GAMMEX® NON-LATEX PI ORTHO SIZE 8, STERILE POLYISOPRENE POWDER-FREE SURGICAL GLOVE: Brand: GAMMEX

## (undated) DEVICE — KIT PLT RATIO DISPNS KT 2IN CANN TIP SPRY TIP DISP MAGELLAN

## (undated) DEVICE — SET ORTHO STD STORTSTD1

## (undated) DEVICE — GOWN,BREATHABLE SLV,AURORA,XLG,STRL: Brand: MEDLINE

## (undated) DEVICE — PICO 7 10CM X 30CM: Brand: PICO™ 7

## (undated) DEVICE — BASIC SINGLE BASIN 1-LF: Brand: MEDLINE INDUSTRIES, INC.

## (undated) DEVICE — NEEDLE HYPO 21GA L1.5IN GRN POLYPR HUB S STL REG BVL STR

## (undated) DEVICE — SYRINGE MED 50ML LUERLOCK TIP

## (undated) DEVICE — 3M™ STERI-DRAPE™ U-DRAPE 1015: Brand: STERI-DRAPE™

## (undated) DEVICE — SET ORTHO ACCOLADE TOTAL HIP BROACH

## (undated) DEVICE — DRIP REDUCTION MANIFOLD

## (undated) DEVICE — GLOVE ORANGE PI 8   MSG9080

## (undated) DEVICE — SURGICAL PROCEDURE PACK BASIC

## (undated) DEVICE — HANDPIECE SET WITH BONE CLEANING TIP AND SUCTION TUBE: Brand: INTERPULSE

## (undated) DEVICE — APPLICATOR PREP 26ML 0.7% IOD POVACRYLEX 74% ISO ALC ST

## (undated) DEVICE — SET ORTHO ACCOLADE TOTAL HIP INSRTION

## (undated) DEVICE — 450 ML BOTTLE OF 0.05% CHLORHEXIDINE GLUCONATE IN 99.95% STERILE WATER FOR IRRIGATION, USP AND APPLICATOR.: Brand: IRRISEPT ANTIMICROBIAL WOUND LAVAGE